# Patient Record
Sex: MALE | Race: BLACK OR AFRICAN AMERICAN | NOT HISPANIC OR LATINO | Employment: OTHER | URBAN - METROPOLITAN AREA
[De-identification: names, ages, dates, MRNs, and addresses within clinical notes are randomized per-mention and may not be internally consistent; named-entity substitution may affect disease eponyms.]

---

## 2018-01-29 ENCOUNTER — HOSPITAL ENCOUNTER (INPATIENT)
Facility: HOSPITAL | Age: 83
LOS: 6 days | Discharge: NURSING, REHAB OR LONG-TERM CARE | DRG: 682 | End: 2018-02-05
Attending: EMERGENCY MEDICINE | Admitting: INTERNAL MEDICINE
Payer: MEDICARE

## 2018-01-29 DIAGNOSIS — N13.39 OTHER HYDRONEPHROSIS: ICD-10-CM

## 2018-01-29 DIAGNOSIS — N18.30 STAGE 3 CHRONIC KIDNEY DISEASE: ICD-10-CM

## 2018-01-29 DIAGNOSIS — E87.5 HYPERKALEMIA: ICD-10-CM

## 2018-01-29 DIAGNOSIS — G30.9 ALZHEIMER'S DEMENTIA WITHOUT BEHAVIORAL DISTURBANCE, UNSPECIFIED TIMING OF DEMENTIA ONSET: Chronic | ICD-10-CM

## 2018-01-29 DIAGNOSIS — E78.5 HYPERLIPIDEMIA, UNSPECIFIED HYPERLIPIDEMIA TYPE: Chronic | ICD-10-CM

## 2018-01-29 DIAGNOSIS — N17.9 ACUTE RENAL FAILURE, UNSPECIFIED ACUTE RENAL FAILURE TYPE: ICD-10-CM

## 2018-01-29 DIAGNOSIS — R33.9 URINARY RETENTION: ICD-10-CM

## 2018-01-29 DIAGNOSIS — E83.39 HYPERPHOSPHATEMIA: ICD-10-CM

## 2018-01-29 DIAGNOSIS — F02.80 ALZHEIMER'S DEMENTIA WITHOUT BEHAVIORAL DISTURBANCE, UNSPECIFIED TIMING OF DEMENTIA ONSET: Chronic | ICD-10-CM

## 2018-01-29 DIAGNOSIS — E87.20 METABOLIC ACIDOSIS: ICD-10-CM

## 2018-01-29 DIAGNOSIS — Z71.89 GOALS OF CARE, COUNSELING/DISCUSSION: ICD-10-CM

## 2018-01-29 DIAGNOSIS — I10 BENIGN ESSENTIAL HYPERTENSION: Chronic | ICD-10-CM

## 2018-01-29 DIAGNOSIS — N18.9 ACUTE ON CHRONIC RENAL INSUFFICIENCY: Primary | ICD-10-CM

## 2018-01-29 DIAGNOSIS — N28.9 ACUTE ON CHRONIC RENAL INSUFFICIENCY: Primary | ICD-10-CM

## 2018-01-29 DIAGNOSIS — D64.9 ANEMIA, UNSPECIFIED TYPE: ICD-10-CM

## 2018-01-29 DIAGNOSIS — E43 SEVERE PROTEIN-CALORIE MALNUTRITION: ICD-10-CM

## 2018-01-29 LAB
ALBUMIN SERPL BCP-MCNC: 1.8 G/DL
ALP SERPL-CCNC: 141 U/L
ALT SERPL W/O P-5'-P-CCNC: 17 U/L
ANION GAP SERPL CALC-SCNC: 16 MMOL/L
ANISOCYTOSIS BLD QL SMEAR: SLIGHT
AST SERPL-CCNC: 21 U/L
BASOPHILS NFR BLD: 0 %
BILIRUB SERPL-MCNC: 0.6 MG/DL
BUN SERPL-MCNC: 136 MG/DL
CALCIUM SERPL-MCNC: 8.1 MG/DL
CHLORIDE SERPL-SCNC: 111 MMOL/L
CO2 SERPL-SCNC: 18 MMOL/L
CREAT SERPL-MCNC: 9.5 MG/DL
DIFFERENTIAL METHOD: ABNORMAL
EOSINOPHIL NFR BLD: 0 %
ERYTHROCYTE [DISTWIDTH] IN BLOOD BY AUTOMATED COUNT: 16.1 %
EST. GFR  (AFRICAN AMERICAN): 5 ML/MIN/1.73 M^2
EST. GFR  (NON AFRICAN AMERICAN): 4 ML/MIN/1.73 M^2
GLUCOSE SERPL-MCNC: 101 MG/DL
HCT VFR BLD AUTO: 23.6 %
HGB BLD-MCNC: 7.7 G/DL
LYMPHOCYTES NFR BLD: 14 %
MAGNESIUM SERPL-MCNC: 1.5 MG/DL
MCH RBC QN AUTO: 25.8 PG
MCHC RBC AUTO-ENTMCNC: 32.6 G/DL
MCV RBC AUTO: 79 FL
MONOCYTES NFR BLD: 6 %
NEUTROPHILS NFR BLD: 80 %
PHOSPHATE SERPL-MCNC: 6.4 MG/DL
PLATELET # BLD AUTO: 240 K/UL
PMV BLD AUTO: 10.3 FL
POTASSIUM SERPL-SCNC: 5.7 MMOL/L
PROT SERPL-MCNC: 5.9 G/DL
RBC # BLD AUTO: 2.98 M/UL
SODIUM SERPL-SCNC: 145 MMOL/L
TARGETS BLD QL SMEAR: ABNORMAL
WBC # BLD AUTO: 9.45 K/UL

## 2018-01-29 PROCEDURE — 84100 ASSAY OF PHOSPHORUS: CPT

## 2018-01-29 PROCEDURE — 85007 BL SMEAR W/DIFF WBC COUNT: CPT

## 2018-01-29 PROCEDURE — 93010 ELECTROCARDIOGRAM REPORT: CPT | Mod: ,,, | Performed by: INTERNAL MEDICINE

## 2018-01-29 PROCEDURE — 85027 COMPLETE CBC AUTOMATED: CPT

## 2018-01-29 PROCEDURE — 83735 ASSAY OF MAGNESIUM: CPT

## 2018-01-29 PROCEDURE — 93005 ELECTROCARDIOGRAM TRACING: CPT

## 2018-01-29 PROCEDURE — 80053 COMPREHEN METABOLIC PANEL: CPT

## 2018-01-29 PROCEDURE — 96360 HYDRATION IV INFUSION INIT: CPT

## 2018-01-29 PROCEDURE — 99285 EMERGENCY DEPT VISIT HI MDM: CPT | Mod: 25

## 2018-01-29 RX ORDER — TRAMADOL HYDROCHLORIDE 50 MG/1
50 TABLET ORAL EVERY 6 HOURS PRN
Status: ON HOLD | COMMUNITY
End: 2018-02-05 | Stop reason: HOSPADM

## 2018-01-29 RX ORDER — ATORVASTATIN CALCIUM 10 MG/1
10 TABLET, FILM COATED ORAL DAILY
Status: ON HOLD | COMMUNITY
End: 2018-03-15 | Stop reason: HOSPADM

## 2018-01-29 RX ORDER — METOPROLOL SUCCINATE 50 MG/1
50 TABLET, EXTENDED RELEASE ORAL DAILY
Status: ON HOLD | COMMUNITY
End: 2018-03-15 | Stop reason: HOSPADM

## 2018-01-30 PROBLEM — F03.90 DEMENTIA WITHOUT BEHAVIORAL DISTURBANCE: Chronic | Status: ACTIVE | Noted: 2018-01-30

## 2018-01-30 PROBLEM — E87.5 HYPERKALEMIA: Status: ACTIVE | Noted: 2018-01-30

## 2018-01-30 PROBLEM — N17.9 ARF (ACUTE RENAL FAILURE): Status: ACTIVE | Noted: 2018-01-30

## 2018-01-30 PROBLEM — E87.20 METABOLIC ACIDOSIS: Status: ACTIVE | Noted: 2018-01-30

## 2018-01-30 PROBLEM — D64.9 ANEMIA: Status: ACTIVE | Noted: 2018-01-30

## 2018-01-30 PROBLEM — N18.9 ACUTE ON CHRONIC RENAL INSUFFICIENCY: Status: ACTIVE | Noted: 2018-01-30

## 2018-01-30 PROBLEM — N28.9 ACUTE ON CHRONIC RENAL INSUFFICIENCY: Status: ACTIVE | Noted: 2018-01-30

## 2018-01-30 PROBLEM — E44.0 MALNUTRITION OF MODERATE DEGREE: Status: ACTIVE | Noted: 2018-01-30

## 2018-01-30 PROBLEM — E78.5 HYPERLIPIDEMIA: Chronic | Status: ACTIVE | Noted: 2018-01-30

## 2018-01-30 PROBLEM — I10 BENIGN ESSENTIAL HYPERTENSION: Chronic | Status: ACTIVE | Noted: 2018-01-30

## 2018-01-30 PROBLEM — E83.39 HYPERPHOSPHATEMIA: Status: ACTIVE | Noted: 2018-01-30

## 2018-01-30 LAB
BACTERIA #/AREA URNS HPF: ABNORMAL /HPF
BILIRUB UR QL STRIP: NEGATIVE
CLARITY UR: ABNORMAL
COLOR UR: ABNORMAL
CREAT UR-MCNC: 84.2 MG/DL
GLUCOSE UR QL STRIP: NEGATIVE
HGB UR QL STRIP: ABNORMAL
HYALINE CASTS #/AREA URNS LPF: 0 /LPF
KETONES UR QL STRIP: NEGATIVE
LEUKOCYTE ESTERASE UR QL STRIP: ABNORMAL
MICROSCOPIC COMMENT: ABNORMAL
NITRITE UR QL STRIP: NEGATIVE
PH UR STRIP: 5 [PH] (ref 5–8)
PROT UR QL STRIP: ABNORMAL
RBC #/AREA URNS HPF: 35 /HPF (ref 0–4)
SODIUM UR-SCNC: 43 MMOL/L
SP GR UR STRIP: 1.01 (ref 1–1.03)
SQUAMOUS #/AREA URNS HPF: 5 /HPF
URN SPEC COLLECT METH UR: ABNORMAL
UROBILINOGEN UR STRIP-ACNC: NEGATIVE EU/DL
WBC #/AREA URNS HPF: >100 /HPF (ref 0–5)

## 2018-01-30 PROCEDURE — 92610 EVALUATE SWALLOWING FUNCTION: CPT

## 2018-01-30 PROCEDURE — 94640 AIRWAY INHALATION TREATMENT: CPT

## 2018-01-30 PROCEDURE — G8997 SWALLOW GOAL STATUS: HCPCS | Mod: CI

## 2018-01-30 PROCEDURE — 25000003 PHARM REV CODE 250: Performed by: EMERGENCY MEDICINE

## 2018-01-30 PROCEDURE — G8996 SWALLOW CURRENT STATUS: HCPCS | Mod: CJ

## 2018-01-30 PROCEDURE — 25000242 PHARM REV CODE 250 ALT 637 W/ HCPCS: Performed by: EMERGENCY MEDICINE

## 2018-01-30 PROCEDURE — 63600175 PHARM REV CODE 636 W HCPCS: Performed by: EMERGENCY MEDICINE

## 2018-01-30 PROCEDURE — 84300 ASSAY OF URINE SODIUM: CPT

## 2018-01-30 PROCEDURE — 21400001 HC TELEMETRY ROOM

## 2018-01-30 PROCEDURE — 81000 URINALYSIS NONAUTO W/SCOPE: CPT

## 2018-01-30 PROCEDURE — 25000003 PHARM REV CODE 250: Performed by: HOSPITALIST

## 2018-01-30 PROCEDURE — C9113 INJ PANTOPRAZOLE SODIUM, VIA: HCPCS | Performed by: EMERGENCY MEDICINE

## 2018-01-30 PROCEDURE — 82570 ASSAY OF URINE CREATININE: CPT

## 2018-01-30 RX ORDER — PANTOPRAZOLE SODIUM 40 MG/10ML
40 INJECTION, POWDER, LYOPHILIZED, FOR SOLUTION INTRAVENOUS DAILY
Status: DISCONTINUED | OUTPATIENT
Start: 2018-01-30 | End: 2018-02-03

## 2018-01-30 RX ORDER — SODIUM BICARBONATE 1 MEQ/ML
50 SYRINGE (ML) INTRAVENOUS
Status: COMPLETED | OUTPATIENT
Start: 2018-01-30 | End: 2018-01-30

## 2018-01-30 RX ORDER — SODIUM CHLORIDE 9 MG/ML
INJECTION, SOLUTION INTRAVENOUS CONTINUOUS
Status: DISCONTINUED | OUTPATIENT
Start: 2018-01-30 | End: 2018-01-31

## 2018-01-30 RX ORDER — ALBUTEROL SULFATE 2.5 MG/.5ML
5 SOLUTION RESPIRATORY (INHALATION)
Status: COMPLETED | OUTPATIENT
Start: 2018-01-30 | End: 2018-01-30

## 2018-01-30 RX ORDER — ACETAMINOPHEN 325 MG/1
650 TABLET ORAL EVERY 6 HOURS PRN
Status: DISCONTINUED | OUTPATIENT
Start: 2018-01-30 | End: 2018-02-05 | Stop reason: HOSPADM

## 2018-01-30 RX ORDER — ONDANSETRON 2 MG/ML
4 INJECTION INTRAMUSCULAR; INTRAVENOUS EVERY 8 HOURS PRN
Status: DISCONTINUED | OUTPATIENT
Start: 2018-01-30 | End: 2018-02-05 | Stop reason: HOSPADM

## 2018-01-30 RX ORDER — METOPROLOL SUCCINATE 50 MG/1
50 TABLET, EXTENDED RELEASE ORAL DAILY
Status: DISCONTINUED | OUTPATIENT
Start: 2018-01-30 | End: 2018-02-05 | Stop reason: HOSPADM

## 2018-01-30 RX ADMIN — ALBUTEROL SULFATE 5 MG: 2.5 SOLUTION RESPIRATORY (INHALATION) at 02:01

## 2018-01-30 RX ADMIN — PANTOPRAZOLE SODIUM 40 MG: 40 INJECTION, POWDER, FOR SOLUTION INTRAVENOUS at 09:01

## 2018-01-30 RX ADMIN — SODIUM CHLORIDE: 0.9 INJECTION, SOLUTION INTRAVENOUS at 09:01

## 2018-01-30 RX ADMIN — SODIUM BICARBONATE 50 MEQ: 84 INJECTION, SOLUTION INTRAVENOUS at 01:01

## 2018-01-30 RX ADMIN — SODIUM CHLORIDE 500 ML: 0.9 INJECTION, SOLUTION INTRAVENOUS at 01:01

## 2018-01-30 RX ADMIN — METOPROLOL SUCCINATE 50 MG: 50 TABLET, EXTENDED RELEASE ORAL at 02:01

## 2018-01-30 RX ADMIN — ACETAMINOPHEN 650 MG: 325 TABLET ORAL at 08:01

## 2018-01-30 NOTE — SUBJECTIVE & OBJECTIVE
Past Medical History:   Diagnosis Date    Alzheimer disease     Arthritis     High cholesterol     Hypertension        Past Surgical History:   Procedure Laterality Date    BACK SURGERY         Review of patient's allergies indicates:  No Known Allergies    No current facility-administered medications on file prior to encounter.      No current outpatient prescriptions on file prior to encounter.     Family History     None        Social History Main Topics    Smoking status: Former Smoker    Smokeless tobacco: Never Used    Alcohol use No    Drug use: Unknown    Sexual activity: Not on file     Review of Systems   Unable to perform ROS: Dementia     Objective:     Vital Signs (Most Recent):  Temp: 98 °F (36.7 °C) (01/30/18 0804)  Pulse: 63 (01/30/18 0804)  Resp: (!) 22 (01/30/18 0804)  BP: 135/72 (01/30/18 0804)  SpO2: 98 % (01/30/18 0345) Vital Signs (24h Range):  Temp:  [98 °F (36.7 °C)-98.3 °F (36.8 °C)] 98 °F (36.7 °C)  Pulse:  [] 63  Resp:  [18-22] 22  SpO2:  [95 %-100 %] 98 %  BP: ()/() 135/72     Weight: 56.1 kg (123 lb 10.9 oz)  Body mass index is 16.77 kg/m².    Physical Exam   Constitutional: No distress.   Frail, Elderly   HENT:   Right Ear: External ear normal.   Left Ear: External ear normal.   Eyes: Conjunctivae are normal. Right eye exhibits no discharge. Left eye exhibits no discharge.   Neck: Neck supple.   Cardiovascular: Normal rate, regular rhythm and normal heart sounds.    Pulmonary/Chest: Effort normal and breath sounds normal. No stridor.   Abdominal: Soft. Bowel sounds are normal.   Musculoskeletal: He exhibits no tenderness.   Neurological: He is alert.   Does not answer questions or follow commands.   Skin: Skin is warm and dry. He is not diaphoretic.           Significant Labs:   BMP:   Recent Labs  Lab 01/29/18 2230         K 5.7*   *   CO2 18*   *   CREATININE 9.5*   CALCIUM 8.1*   MG 1.5*     CBC:   Recent Labs  Lab 01/29/18 2230    WBC 9.45   HGB 7.7*   HCT 23.6*          Significant Imaging: I have reviewed all pertinent imaging results/findings within the past 24 hours.

## 2018-01-30 NOTE — PT/OT/SLP EVAL
Speech Language Pathology Evaluation  Bedside Swallow    Patient Name:  Alen Marin   MRN:  [id number]  Admitting Diagnosis: ARF (acute renal failure)    Recommendations:                 General Recommendations:  Dysphagia therapy  Diet recommendations:  Puree, Thin   Aspiration Precautions: 1 bite/sip at a time and HOB to 90 degrees   General Precautions: Standard, pureed diet  Communication strategies:  simple sentences    History:     Past Medical History:   Diagnosis Date    Alzheimer disease     Arthritis     High cholesterol     Hypertension        Past Surgical History:   Procedure Laterality Date    BACK SURGERY         Social History: Patient lives at [facility].    Modified Barium Swallow: none on file    Chest X-Rays: no acute process    Prior diet: per [facility] soft with thin liquids (tolerating well per report)      Subjective   Pt requesting thin liquids upon ST entrance  Patient goals: intake of thin liquids    Objective:     Oral Musculature Evaluation  · Oral Musculature: unable to assess due to poor participation/comprehension  · Dentition: edentulous  · Voice Prior to PO Intake: wfl    Bedside Swallow Eval:   Consistencies Assessed:  · Thin liquids 10oz multiple trials via straw  · Puree X3  · Solids X1     Oral Phase:   · WFL- thin via straw  · Required moderate cuing to orally accept puree trials via spoon  · Pt noted to suck solid trials rather than attend to bolus prep; trial was removed from oral cavity  · Mild swallow delay    Pharyngeal Phase:   · no overt clinical signs/symptoms of aspiration    Compensatory Strategies  · None    Treatment: will follow as baseline diet is mech soft per report; attention to bolus prep and A-P is therefore below reported baseline.    Assessment:     Alen Marin is a 88 y.o. male with diagnosis of ARF he presents with mild-moderate oral dysphagia c/b decreased attention to bolus.     Goals:    SLP Goals        Problem: SLP Goal    Goal Priority  Disciplines Outcome   SLP Goal    Low SLP Ongoing (interventions implemented as appropriate)   Description:  STGs  1. Pt will tolerate puree with thin liquids without overt s/s of aspiration  2. Pt will perform adequate bolus prep and A-P transport of solid trials X3.                    Plan:     · Patient to be seen:  3 x/week   · Plan of Care expires:  02/09/18  · Plan of Care reviewed with:  patient   · SLP Follow-Up:  Yes       Discharge recommendations:  nursing facility, basic   Barriers to Discharge:  None    Time Tracking:     SLP Treatment Date:   01/30/18  Speech Start Time:  1420  Speech Stop Time:  1430     Speech Total Time (min):  10 min    Billable Minutes: Eval Swallow and Oral Function 10    Isabella Merino CCC-SLP  01/30/2018

## 2018-01-30 NOTE — PLAN OF CARE
From [facility] correction     01/30/18 8780   Discharge Assessment   Assessment Type Discharge Planning Assessment   Assessment information obtained from? Patient;Medical Record;Caregiver   Prior to hospitilization cognitive status: Not Oriented to Place;Not Oriented to Time   Prior to hospitalization functional status: Needs Assistance;Partially Dependent   Current Functional Status: Needs Assistance;Partially Dependent   Lives With facility resident  ([facility])   Able to Return to Prior Arrangements yes   Is patient able to care for self after discharge? No   Who are your caregiver(s) and their phone number(s)? (son, Miguelito Marin [phone])   Patient's perception of discharge disposition admitted as an inpatient   Readmission Within The Last 30 Days no previous admission in last 30 days   Patient currently being followed by outpatient case management? No   Patient currently receives any other outside agency services? No   Equipment Currently Used at Home other (see comments)  (DME used at [facility])   Do you have any problems affording any of your prescribed medications? No   Is the patient taking medications as prescribed? yes   Does the patient have transportation home? Yes   Transportation Available van, wheelchair accessible;ambulance   Does the patient receive services at the [facility]? No   Discharge Plan A Return to nursing home   Discharge Plan B Skilled Nursing Facility   Patient/Family In Agreement With Plan yes

## 2018-01-30 NOTE — PLAN OF CARE
Problem: SLP Goal  Goal: SLP Goal  STGs  1. Pt will tolerate puree with thin liquids without overt s/s of aspiration  2. Pt will perform adequate bolus prep and A-P transport of solid trials X3.   Outcome: Ongoing (interventions implemented as appropriate)  1/30/18  RECS: puree with thin liquids, meds according to Pt preference, will follow. Isabella Merino, CURT-SLP

## 2018-01-30 NOTE — ASSESSMENT & PLAN NOTE
Continue home Toprol.  Will place parameters to make sure renal failure not secondary to episodes of hypotension.

## 2018-01-30 NOTE — ASSESSMENT & PLAN NOTE
Probably anemia of chronic disease, but no previous labs.  No evidence of bleeding.  Continue to monitor.

## 2018-01-30 NOTE — CONSULTS
Dictation #1  MRN:[id number]  CSN:[id number]    Consult dictated # [id number]    Continue IVF  Watch renal function closely  We'll follow  Thanks

## 2018-01-30 NOTE — ASSESSMENT & PLAN NOTE
Related to (etiology):   Inadequate oral intake     Signs and Symptoms (as evidenced by):   1) Fluid accumulation  2) Mild/moderate muscle mass loss of bilateral temporalis     Interventions/Recommendations (treatment strategy):  1) Renal diet (pureed per Speech Therapy recommendations) 2) Continue Novasource Renal supplement 3) Renal MVI 4) Consider appetite stimulant 5) Monitor po intake and tolerance 6) Monitor weight    Nutrition Diagnosis Status:   New

## 2018-01-30 NOTE — PROGRESS NOTES
[facility]  Adult Nutrition  Progress Note    SUMMARY     Recommendations  1) Renal diet (pureed per Speech Therapy recommendations) 2) Continue Novasource Renal supplement 3) Renal MVI 4) Consider appetite stimulant 5) Monitor po intake and tolerance 6) Monitor weight  Goals: 1) Promote weight gain 2) Patient to tolerate oral diet 3) Patient to consume >=85% of EEN x5 days  Nutrition Goal Status: new  Communication of RD Recs: other (comment) (care plan/notes, paged MD)    Continuum of Care Plan    Nutrition Discharge Planning: Patient to discharge on a Renal diet (texture per Speech Therapy) with ONS TID to promote weight gain.    Reason for Assessment    Reason for Assessment: identified at risk by screening criteria (patient refusing food and fluids)  Diagnosis: other (see comments) (ARF, hyperkalemia)  General Information Comments: SLP assessed, recommends pureed diet with thin liquids. Patient refusing meals and fluids per nursing. Patient sleeping during visit. Physical signs of moderate malnutrition observed. RD to follow-up.    Nutrition Prescription Ordered    Current Diet Order: Renal    Oral Nutrition Supplement: Novasource TID     Evaluation of Received Nutrients/Fluid Intake    Energy Calories Required: not meeting needs  Protein Required: not meeting needs    IV Fluid (mL): 1200    I/O: not fully recorded    Fluid Required: meeting needs     Tolerance: other (see comments) (FRANCO - refusing meals)  % Intake of Estimated Energy Needs: 0 - 25 %  % Meal Intake: 25%     Nutrition Risk Screen     Nutrition Risk Screen: other (see comments) (pt refusing fluids and food)    Nutrition/Diet History    Food Preferences: franco    Factors Affecting Nutritional Intake: behavioral (mealtime), impaired cognitive status/motor control, decreased appetite, difficulty/impaired swallowing    Labs/Tests/Procedures/Meds    Diagnostic Test/Procedure Review: reviewed, pertinent  Pertinent Labs Reviewed:  reviewed, pertinent  BMP  Lab Results   Component Value Date     01/29/2018    K 5.7 (H) 01/29/2018     (H) 01/29/2018    CO2 18 (L) 01/29/2018     (H) 01/29/2018    CREATININE 9.5 (H) 01/29/2018    CALCIUM 8.1 (L) 01/29/2018    ANIONGAP 16 01/29/2018    ESTGFRAFRICA 5 (A) 01/29/2018    EGFRNONAA 4 (A) 01/29/2018     Lab Results   Component Value Date    CALCIUM 8.1 (L) 01/29/2018    PHOS 6.4 (H) 01/29/2018     Lab Results   Component Value Date    ALBUMIN 1.8 (L) 01/29/2018     Pertinent Medications Reviewed: reviewed, pertinent  Pertinent Medications Comments: IVF    Physical Findings    Overall Physical Appearance: edematous, loss of muscle mass, underweight  Tubes: other (see comments) (none)  Skin: edema, intact (Javan Score 12)    Anthropometrics    Temp: 98 °F (36.7 °C)     Height: 6' (182.9 cm)  Weight Method: Bed Scale  Weight: 56.1 kg (123 lb 10.9 oz)  Ideal Body Weight (IBW), Male: 178 lb  % Ideal Body Weight, Male (lb): 69.48 lb  BMI (Calculated): 16.8  BMI Grade: 16 - 16.9 protein-energy malnutrition grade II     Usual Body Weight (UBW), kg:  (yulissa - no weight records in EPIC)      Estimated/Assessed Needs    Weight Used For Calorie Calculations: 56.1 kg (123 lb 10.9 oz)     Energy Need Method: Kcal/kg (1963 - 2244)  RMR ([place]-St. Jeor Equation): 1269  Weight Used For Protein Calculations: 56.1 kg (123 lb 10.9 oz)  Protein Requirements: 67 g  Fluid Need Method: RDA method or per MD (UOP not recorded (ARF))  CHO Requirement: N/A    Assessment and Plan    Malnutrition of moderate degree    Related to (etiology):   Inadequate oral intake     Signs and Symptoms (as evidenced by):   1) Fluid accumulation  2) Mild/moderate muscle mass loss of bilateral temporalis     Interventions/Recommendations (treatment strategy):  1) Renal diet (pureed per Speech Therapy recommendations) 2) Continue Novasource Renal supplement 3) Renal MVI 4) Consider appetite stimulant 5) Monitor po intake and  tolerance 6) Monitor weight    Nutrition Diagnosis Status:   New              Monitor and Evaluation    Food and Nutrient Intake: energy intake, food and beverage intake  Food and Nutrient Adminstration: diet order, other (specify) (supplement order)  Physical Activity and Function: nutrition-related ADLs and IADLs  Anthropometric Measurements: weight, weight change, body mass index  Biochemical Data, Medical Tests and Procedures: electrolyte and renal panel, gastrointestinal profile, inflammatory profile, lipid profile  Nutrition-Focused Physical Findings: overall appearance, extremities, muscles and bones, head and eyes, skin    Nutrition Risk    Level of Risk: other (see comments) (f/u 2x weekly)    Nutrition Follow-Up    RD Follow-up?: Yes

## 2018-01-30 NOTE — NURSING
Report received from VETO Perez. Rounds and bedside shift report completed. Pt in no acute distress. Care assumed.

## 2018-01-30 NOTE — ASSESSMENT & PLAN NOTE
Patient with significant elevation of BUN/Creat.  No previous labs and unable to determine if patient may have some degree of CKD.  Seems pre renal.  Started on IVF hydration and Nephrology consulted.  Will get Renal US.  Awaiting urine studies.  Will have to consider other etiologies if no improvement with hydration.  Repeat BMP in AM.

## 2018-01-30 NOTE — ED PROVIDER NOTES
Encounter Date: 1/29/2018    SCRIBE #1 NOTE: I, Glenn Malagon, am scribing for, and in the presence of,  Dank Joseph III, MD. I have scribed the following portions of the note - Other sections scribed: HPI, ROS.       History     Chief Complaint   Patient presents with    Abnormal Lab     sent from [facility] for abnormal BUN (24) and Creatine (8.29),      CC: Abnormal Lab    HPI: This 88 y.o. Male with arthritis, high cholestrol and HTN presents to the ED for abnormal BUN and Creatine (8.29).  Approximately one month ago patient was admitted to an outside facility for subdural hematoma and acute kidney injury.  Internal medicine and nephrology H&P from that hospitalization are brought in from nursing facility today.  After that hospitalization patient was transferred to [facility].  Repeat labs performed 3 days ago revealed worsening renal function.  He has not taken any medications for his current symptoms.  Patient reports that he feels okay but is otherwise unable to contribute to the history due to severe underlying dementia.      The history is provided by the patient. No  was used.     Review of patient's allergies indicates:  No Known Allergies  Past Medical History:   Diagnosis Date    Alzheimer disease     Arthritis     High cholesterol     Hypertension      Past Surgical History:   Procedure Laterality Date    BACK SURGERY       History reviewed. No pertinent family history.  Social History   Substance Use Topics    Smoking status: Former Smoker    Smokeless tobacco: Never Used    Alcohol use No     Review of Systems   Unable to perform ROS: Dementia       Physical Exam     Initial Vitals [01/29/18 1956]   BP Pulse Resp Temp SpO2   (!) 103/43 66 19 98.3 °F (36.8 °C) 95 %      MAP       63         Physical Exam    Constitutional: He appears well-developed and well-nourished. He is not diaphoretic. No distress.   HENT:   Head: Normocephalic and atraumatic.   Nose:  Nose normal.   Mouth/Throat: Oropharynx is clear and moist. No oropharyngeal exudate.   Eyes: Conjunctivae and EOM are normal. Pupils are equal, round, and reactive to light. No scleral icterus.   Neck: Normal range of motion. Neck supple. No thyromegaly present. No tracheal deviation present.   Cardiovascular: Normal rate and normal heart sounds. Exam reveals no gallop and no friction rub.    No murmur heard.  Irregular   Pulmonary/Chest: Breath sounds normal. No respiratory distress. He has no wheezes. He has no rhonchi. He has no rales.   Abdominal: Soft. Bowel sounds are normal. He exhibits no distension and no mass. There is no tenderness. There is no rebound and no guarding.   Musculoskeletal: Normal range of motion. He exhibits no edema or tenderness.   Lymphadenopathy:     He has no cervical adenopathy.   Neurological: He is alert. He has normal strength. No cranial nerve deficit or sensory deficit.   Demented and confused, grossly normal neurologic exam   Skin: Skin is warm and dry. No rash noted. No erythema. No pallor.   Psychiatric: He has a normal mood and affect. His behavior is normal. Thought content normal.         ED Course   Critical Care  Date/Time: 1/30/2018 3:13 AM  Performed by: TOAN RBOWN III  Authorized by: TOAN BROWN III   Direct patient critical care time: 15 minutes  Additional history critical care time: 6 minutes  Ordering / reviewing critical care time: 7 minutes  Documentation critical care time: 7 minutes  Consulting other physicians critical care time: 5 minutes  Total critical care time (exclusive of procedural time) : 40 minutes  Critical care time was exclusive of teaching time and separately billable procedures and treating other patients.  Critical care was necessary to treat or prevent imminent or life-threatening deterioration of the following conditions: renal failure.        Labs Reviewed   CBC W/ AUTO DIFFERENTIAL - Abnormal; Notable for the following:         Result Value    RBC 2.98 (*)     Hemoglobin 7.7 (*)     Hematocrit 23.6 (*)     MCV 79 (*)     MCH 25.8 (*)     RDW 16.1 (*)     Gran% 80.0 (*)     Lymph% 14.0 (*)     All other components within normal limits   COMPREHENSIVE METABOLIC PANEL - Abnormal; Notable for the following:     Potassium 5.7 (*)     Chloride 111 (*)     CO2 18 (*)     BUN, Bld 136 (*)     Creatinine 9.5 (*)     Calcium 8.1 (*)     Total Protein 5.9 (*)     Albumin 1.8 (*)     Alkaline Phosphatase 141 (*)     eGFR if  5 (*)     eGFR if non  4 (*)     All other components within normal limits   MAGNESIUM - Abnormal; Notable for the following:     Magnesium 1.5 (*)     All other components within normal limits   PHOSPHORUS - Abnormal; Notable for the following:     Phosphorus 6.4 (*)     All other components within normal limits   URINALYSIS             Medical Decision Making:   Initial Assessment:   88-year-old male history of hypertension, high cholesterol, Alzheimer's disease sent from skilled nursing facility for renal failure.  Admitted recently for subdural hematoma and acute kidney injury.  Notes from that admission were brought in today with patient's paperwork from [facility].  At that time patient had a Robles catheter placed and then attempted to remove it, causing hematuria.  BUN and creatinine performed 3 days ago were 124 and 8.29, with a potassium of 5.3 and bicarbonate of 21.  Labs performed on December 28 showed BUN and creatinine of 114 and 6.2, with a potassium of 4.0 and a bicarbonate of 15.  It looks as though commerce patient was had with family regarding goals of care, but no conclusion is written in the notes provided.  The patient does arrive with documentation that he is full code with full medical treatment preferred, including long-term artificial initiation by tube.  This documentation was signed by a family member on January 2 and by a physician on January 13.  Patient is  demented and confused on exam, but is awake and alert.  Independently Interpreted Test(s):   I have ordered and independently interpreted X-rays - see summary below.       <> Summary of X-Ray Reading(s): Chest x-ray: No acute abnormality  I have ordered and independently interpreted EKG Reading(s) - see summary below       <> Summary of EKG Reading(s): Sinus rhythm with sinus arrhythmia versus atrial fibrillation, if axis deviation, right bundle branch block, no definite acute ischemia  ED Management:  Laboratory evaluation reveals worsening renal function and also hemoglobin of 7.7.  Patient is guaiac negative.  Given family's desire for full care, will recommend admission for IV fluids, repeat labs, nephrology consultation.    May need family meeting to discuss the very low likelihood of meaningful recovery without feeding tube and/or dialysis.             Scribe Attestation:   Scribe #1: I performed the above scribed service and the documentation accurately describes the services I performed. I attest to the accuracy of the note.    Attending Attestation:           Physician Attestation for Scribe:  Physician Attestation Statement for Scribe #1: I, Dank Joseph III, MD, reviewed documentation, as scribed by Glenn Malagon in my presence, and it is both accurate and complete.                 ED Course      Clinical Impression:   The primary encounter diagnosis was Acute on chronic renal insufficiency. Diagnoses of Hyperkalemia, Metabolic acidosis, and Anemia, unspecified type were also pertinent to this visit.                           Dank Joseph III, MD  01/30/18 0313

## 2018-01-30 NOTE — ED TRIAGE NOTES
Pt presents to ED with c/o abnormal kidney function. Per nursing staff at [facility], his creatine and BUN is elevated. Lab work on 1/26/17 shows BUN of 24 and a Creatine of 8.2. Family at bedside reports he had a fall with bleeding in the breain around [facility] and since then he has been having issues with hydration and his kidneys. Pt does have hx of alzheimer's per family and is at baseline. No distress is noted. Will continue to be monitored.

## 2018-01-30 NOTE — PLAN OF CARE
Problem: Patient Care Overview  Goal: Plan of Care Review  01/30/2018    Recommendations  1) Renal diet (pureed per Speech Therapy recommendations) 2) Continue Novasource Renal supplement 3) Renal MVI 4) Consider appetite stimulant 5) Monitor po intake and tolerance 6) Monitor weight  Goals: 1) Promote weight gain 2) Patient to tolerate oral diet 3) Patient to consume >=85% of EEN x5 days  Nutrition Goal Status: new  Communication of RD Recs: other (comment) (care plan/notes, paged MD)    Alie Del Rio, MPH, RD, LDN

## 2018-01-30 NOTE — ASSESSMENT & PLAN NOTE
Hyperkalemia, Metabolic Acidosis and Hyperphosphatemia secondary to renal failure.  No evidence of peaked T waves on EKG.  Closely monitor.

## 2018-01-30 NOTE — HPI
87 y/o male presents to the ER from NH for abnormal BUN and Creatine (8.2).  Patient has advanced dementia and unable to give history.  Called patient's son, but no answer.  History obtained per ER notes.  Approximately one month ago patient was admitted to an outside facility for subdural hematoma and acute kidney injury.  After that hospitalization patient was transferred to [facility].  Repeat labs performed 3 days ago revealed worsening renal function.  Patient reports that he feels okay but is otherwise unable to contribute to the history due to severe underlying dementia.  Patient was noted to have significant elevation of BUN/Creat upon presentation and admitted for treatment and work up.  No further history able to be obtained.

## 2018-01-30 NOTE — H&P
[facility] Hospital Medicine  History & Physical    Patient Name: Alen Marin  MRN: [id number]  Admission Date: 1/29/2018  Attending Physician: Tung Brush MD   Primary Care Provider: [facility] Medical Records         Patient information was obtained from ER records.     Subjective:     Principal Problem:ARF (acute renal failure)    Chief Complaint:   Chief Complaint   Patient presents with    Abnormal Lab     sent from [facility] for abnormal BUN (24) and Creatine (8.29),         HPI: 89 y/o male presents to the ER from NH for abnormal BUN and Creatine (8.2).  Patient has advanced dementia and unable to give history.  Called patient's son, but no answer.  History obtained per ER notes.  Approximately one month ago patient was admitted to an outside facility for subdural hematoma and acute kidney injury.  After that hospitalization patient was transferred to [facility].  Repeat labs performed 3 days ago revealed worsening renal function.  Patient reports that he feels okay but is otherwise unable to contribute to the history due to severe underlying dementia.  Patient was noted to have significant elevation of BUN/Creat upon presentation and admitted for treatment and work up.  No further history able to be obtained.    Past Medical History:   Diagnosis Date    Alzheimer disease     Arthritis     High cholesterol     Hypertension        Past Surgical History:   Procedure Laterality Date    BACK SURGERY         Review of patient's allergies indicates:  No Known Allergies    No current facility-administered medications on file prior to encounter.      No current outpatient prescriptions on file prior to encounter.     Family History     None        Social History Main Topics    Smoking status: Former Smoker    Smokeless tobacco: Never Used    Alcohol use No    Drug use: Unknown    Sexual activity: Not on file     Review of Systems   Unable to perform ROS:  Dementia     Objective:     Vital Signs (Most Recent):  Temp: 98 °F (36.7 °C) (01/30/18 0804)  Pulse: 63 (01/30/18 0804)  Resp: (!) 22 (01/30/18 0804)  BP: 135/72 (01/30/18 0804)  SpO2: 98 % (01/30/18 0345) Vital Signs (24h Range):  Temp:  [98 °F (36.7 °C)-98.3 °F (36.8 °C)] 98 °F (36.7 °C)  Pulse:  [] 63  Resp:  [18-22] 22  SpO2:  [95 %-100 %] 98 %  BP: ()/() 135/72     Weight: 56.1 kg (123 lb 10.9 oz)  Body mass index is 16.77 kg/m².    Physical Exam   Constitutional: No distress.   Frail, Elderly   HENT:   Right Ear: External ear normal.   Left Ear: External ear normal.   Eyes: Conjunctivae are normal. Right eye exhibits no discharge. Left eye exhibits no discharge.   Neck: Neck supple.   Cardiovascular: Normal rate, regular rhythm and normal heart sounds.    Pulmonary/Chest: Effort normal and breath sounds normal. No stridor.   Abdominal: Soft. Bowel sounds are normal.   Musculoskeletal: He exhibits no tenderness.   Neurological: He is alert.   Does not answer questions or follow commands.   Skin: Skin is warm and dry. He is not diaphoretic.           Significant Labs:   BMP:   Recent Labs  Lab 01/29/18  2230         K 5.7*   *   CO2 18*   *   CREATININE 9.5*   CALCIUM 8.1*   MG 1.5*     CBC:   Recent Labs  Lab 01/29/18  2230   WBC 9.45   HGB 7.7*   HCT 23.6*          Significant Imaging: I have reviewed all pertinent imaging results/findings within the past 24 hours.    Assessment/Plan:     * ARF (acute renal failure)    Patient with significant elevation of BUN/Creat.  No previous labs and unable to determine if patient may have some degree of CKD.  Seems pre renal.  Started on IVF hydration and Nephrology consulted.  Will get Renal US.  Awaiting urine studies.  Will have to consider other etiologies if no improvement with hydration.  Repeat BMP in AM.        Hyperkalemia    Hyperkalemia, Metabolic Acidosis and Hyperphosphatemia secondary to renal failure.  No  evidence of peaked T waves on EKG.  Closely monitor.        Hyperlipidemia              Benign essential hypertension    Continue home Toprol.  Will place parameters to make sure renal failure not secondary to episodes of hypotension.          Dementia without behavioral disturbance    Probably at baseline.          Anemia    Probably anemia of chronic disease, but no previous labs.  No evidence of bleeding.  Continue to monitor.          VTE Risk Mitigation         Ordered     Medium Risk of VTE  Once      01/30/18 0708     Place sequential compression device  Until discontinued      01/30/18 0708     Place TRISTAN hose  Until discontinued      01/30/18 0708             Tung Brush MD  Department of Hospital Medicine   [facility]

## 2018-01-31 ENCOUNTER — ANESTHESIA (OUTPATIENT)
Dept: SURGERY | Facility: HOSPITAL | Age: 83
DRG: 682 | End: 2018-01-31
Payer: MEDICARE

## 2018-01-31 ENCOUNTER — TELEPHONE (OUTPATIENT)
Dept: UROLOGY | Facility: CLINIC | Age: 83
End: 2018-01-31

## 2018-01-31 ENCOUNTER — ANESTHESIA EVENT (OUTPATIENT)
Dept: SURGERY | Facility: HOSPITAL | Age: 83
DRG: 682 | End: 2018-01-31
Payer: MEDICARE

## 2018-01-31 PROBLEM — N13.39 OTHER HYDRONEPHROSIS: Status: ACTIVE | Noted: 2018-01-31

## 2018-01-31 LAB
ABO + RH BLD: NORMAL
ALBUMIN SERPL BCP-MCNC: 1.7 G/DL
ALP SERPL-CCNC: 129 U/L
ALT SERPL W/O P-5'-P-CCNC: 12 U/L
ANION GAP SERPL CALC-SCNC: 16 MMOL/L
ANION GAP SERPL CALC-SCNC: 16 MMOL/L
AST SERPL-CCNC: 25 U/L
BASOPHILS # BLD AUTO: 0.01 K/UL
BASOPHILS NFR BLD: 0.1 %
BILIRUB SERPL-MCNC: 0.5 MG/DL
BLD GP AB SCN CELLS X3 SERPL QL: NORMAL
BUN SERPL-MCNC: 142 MG/DL
BUN SERPL-MCNC: 142 MG/DL
CALCIUM SERPL-MCNC: 7.5 MG/DL
CALCIUM SERPL-MCNC: 7.5 MG/DL
CHLORIDE SERPL-SCNC: 117 MMOL/L
CHLORIDE SERPL-SCNC: 117 MMOL/L
CO2 SERPL-SCNC: 16 MMOL/L
CO2 SERPL-SCNC: 16 MMOL/L
CREAT SERPL-MCNC: 8.3 MG/DL
CREAT SERPL-MCNC: 8.3 MG/DL
DIFFERENTIAL METHOD: ABNORMAL
EOSINOPHIL # BLD AUTO: 0 K/UL
EOSINOPHIL NFR BLD: 0.5 %
ERYTHROCYTE [DISTWIDTH] IN BLOOD BY AUTOMATED COUNT: 16.4 %
EST. GFR  (AFRICAN AMERICAN): 6 ML/MIN/1.73 M^2
EST. GFR  (AFRICAN AMERICAN): 6 ML/MIN/1.73 M^2
EST. GFR  (NON AFRICAN AMERICAN): 5 ML/MIN/1.73 M^2
EST. GFR  (NON AFRICAN AMERICAN): 5 ML/MIN/1.73 M^2
GLUCOSE SERPL-MCNC: 86 MG/DL
GLUCOSE SERPL-MCNC: 86 MG/DL
HCT VFR BLD AUTO: 22.2 %
HGB BLD-MCNC: 7 G/DL
LYMPHOCYTES # BLD AUTO: 1.6 K/UL
LYMPHOCYTES NFR BLD: 18.6 %
MCH RBC QN AUTO: 25.3 PG
MCHC RBC AUTO-ENTMCNC: 31.5 G/DL
MCV RBC AUTO: 80 FL
MONOCYTES # BLD AUTO: 0.8 K/UL
MONOCYTES NFR BLD: 9.8 %
NEUTROPHILS # BLD AUTO: 6 K/UL
NEUTROPHILS NFR BLD: 71 %
PLATELET # BLD AUTO: 234 K/UL
PMV BLD AUTO: 10.2 FL
POTASSIUM SERPL-SCNC: 4.6 MMOL/L
POTASSIUM SERPL-SCNC: 4.6 MMOL/L
PROT SERPL-MCNC: 5.4 G/DL
RBC # BLD AUTO: 2.77 M/UL
SODIUM SERPL-SCNC: 149 MMOL/L
SODIUM SERPL-SCNC: 149 MMOL/L
WBC # BLD AUTO: 8.38 K/UL

## 2018-01-31 PROCEDURE — D9220A PRA ANESTHESIA: Mod: ANES,,, | Performed by: ANESTHESIOLOGY

## 2018-01-31 PROCEDURE — 94761 N-INVAS EAR/PLS OXIMETRY MLT: CPT

## 2018-01-31 PROCEDURE — 52005 CYSTO W/URTRL CATHJ: CPT | Mod: ,,, | Performed by: UROLOGY

## 2018-01-31 PROCEDURE — C9113 INJ PANTOPRAZOLE SODIUM, VIA: HCPCS | Performed by: EMERGENCY MEDICINE

## 2018-01-31 PROCEDURE — D9220A PRA ANESTHESIA: Mod: CRNA,,, | Performed by: NURSE ANESTHETIST, CERTIFIED REGISTERED

## 2018-01-31 PROCEDURE — 25000003 PHARM REV CODE 250: Performed by: INTERNAL MEDICINE

## 2018-01-31 PROCEDURE — 80053 COMPREHEN METABOLIC PANEL: CPT

## 2018-01-31 PROCEDURE — 25000003 PHARM REV CODE 250: Performed by: EMERGENCY MEDICINE

## 2018-01-31 PROCEDURE — 36000706: Performed by: UROLOGY

## 2018-01-31 PROCEDURE — 37000009 HC ANESTHESIA EA ADD 15 MINS: Performed by: UROLOGY

## 2018-01-31 PROCEDURE — 25000003 PHARM REV CODE 250: Performed by: NURSE ANESTHETIST, CERTIFIED REGISTERED

## 2018-01-31 PROCEDURE — 71000039 HC RECOVERY, EACH ADD'L HOUR: Performed by: UROLOGY

## 2018-01-31 PROCEDURE — C1758 CATHETER, URETERAL: HCPCS | Performed by: UROLOGY

## 2018-01-31 PROCEDURE — 86920 COMPATIBILITY TEST SPIN: CPT

## 2018-01-31 PROCEDURE — 27000221 HC OXYGEN, UP TO 24 HOURS

## 2018-01-31 PROCEDURE — 0T778DZ DILATION OF LEFT URETER WITH INTRALUMINAL DEVICE, VIA NATURAL OR ARTIFICIAL OPENING ENDOSCOPIC: ICD-10-PCS | Performed by: UROLOGY

## 2018-01-31 PROCEDURE — 85025 COMPLETE CBC W/AUTO DIFF WBC: CPT

## 2018-01-31 PROCEDURE — 74420 UROGRAPHY RTRGR +-KUB: CPT | Mod: 26,,, | Performed by: UROLOGY

## 2018-01-31 PROCEDURE — 25500020 PHARM REV CODE 255: Performed by: UROLOGY

## 2018-01-31 PROCEDURE — BT141ZZ FLUOROSCOPY OF KIDNEYS, URETERS AND BLADDER USING LOW OSMOLAR CONTRAST: ICD-10-PCS | Performed by: UROLOGY

## 2018-01-31 PROCEDURE — 71000033 HC RECOVERY, INTIAL HOUR: Performed by: UROLOGY

## 2018-01-31 PROCEDURE — 99223 1ST HOSP IP/OBS HIGH 75: CPT | Mod: 25,,, | Performed by: UROLOGY

## 2018-01-31 PROCEDURE — 36000707: Performed by: UROLOGY

## 2018-01-31 PROCEDURE — 94002 VENT MGMT INPAT INIT DAY: CPT

## 2018-01-31 PROCEDURE — 86850 RBC ANTIBODY SCREEN: CPT

## 2018-01-31 PROCEDURE — 92526 ORAL FUNCTION THERAPY: CPT

## 2018-01-31 PROCEDURE — 12000002 HC ACUTE/MED SURGE SEMI-PRIVATE ROOM

## 2018-01-31 PROCEDURE — C1769 GUIDE WIRE: HCPCS | Performed by: UROLOGY

## 2018-01-31 PROCEDURE — 63600175 PHARM REV CODE 636 W HCPCS: Performed by: HOSPITALIST

## 2018-01-31 PROCEDURE — 63600175 PHARM REV CODE 636 W HCPCS: Performed by: EMERGENCY MEDICINE

## 2018-01-31 PROCEDURE — 25000003 PHARM REV CODE 250: Performed by: HOSPITALIST

## 2018-01-31 PROCEDURE — 37000008 HC ANESTHESIA 1ST 15 MINUTES: Performed by: UROLOGY

## 2018-01-31 PROCEDURE — 63600175 PHARM REV CODE 636 W HCPCS: Performed by: NURSE ANESTHETIST, CERTIFIED REGISTERED

## 2018-01-31 PROCEDURE — 36415 COLL VENOUS BLD VENIPUNCTURE: CPT

## 2018-01-31 RX ORDER — HYDROMORPHONE HYDROCHLORIDE 2 MG/ML
0.2 INJECTION, SOLUTION INTRAMUSCULAR; INTRAVENOUS; SUBCUTANEOUS EVERY 5 MIN PRN
Status: DISCONTINUED | OUTPATIENT
Start: 2018-01-31 | End: 2018-02-05 | Stop reason: HOSPADM

## 2018-01-31 RX ORDER — DIPHENHYDRAMINE HYDROCHLORIDE 50 MG/ML
25 INJECTION INTRAMUSCULAR; INTRAVENOUS EVERY 6 HOURS PRN
Status: DISCONTINUED | OUTPATIENT
Start: 2018-01-31 | End: 2018-02-05 | Stop reason: HOSPADM

## 2018-01-31 RX ORDER — PHENYLEPHRINE HYDROCHLORIDE 10 MG/ML
INJECTION INTRAVENOUS
Status: DISCONTINUED | OUTPATIENT
Start: 2018-01-31 | End: 2018-01-31

## 2018-01-31 RX ORDER — SODIUM CHLORIDE 0.9 % (FLUSH) 0.9 %
3 SYRINGE (ML) INJECTION
Status: DISCONTINUED | OUTPATIENT
Start: 2018-01-31 | End: 2018-02-05 | Stop reason: HOSPADM

## 2018-01-31 RX ORDER — GLYCOPYRROLATE 0.2 MG/ML
INJECTION INTRAMUSCULAR; INTRAVENOUS
Status: DISCONTINUED | OUTPATIENT
Start: 2018-01-31 | End: 2018-01-31

## 2018-01-31 RX ORDER — SODIUM CHLORIDE 450 MG/100ML
INJECTION, SOLUTION INTRAVENOUS CONTINUOUS
Status: DISCONTINUED | OUTPATIENT
Start: 2018-01-31 | End: 2018-02-02

## 2018-01-31 RX ORDER — ROCURONIUM BROMIDE 10 MG/ML
INJECTION, SOLUTION INTRAVENOUS
Status: DISCONTINUED | OUTPATIENT
Start: 2018-01-31 | End: 2018-01-31

## 2018-01-31 RX ORDER — SODIUM CHLORIDE 9 MG/ML
INJECTION, SOLUTION INTRAVENOUS CONTINUOUS PRN
Status: DISCONTINUED | OUTPATIENT
Start: 2018-01-31 | End: 2018-01-31

## 2018-01-31 RX ORDER — METOCLOPRAMIDE HYDROCHLORIDE 5 MG/ML
10 INJECTION INTRAMUSCULAR; INTRAVENOUS EVERY 10 MIN PRN
Status: DISCONTINUED | OUTPATIENT
Start: 2018-01-31 | End: 2018-02-05 | Stop reason: HOSPADM

## 2018-01-31 RX ORDER — PROPOFOL 10 MG/ML
VIAL (ML) INTRAVENOUS
Status: DISCONTINUED | OUTPATIENT
Start: 2018-01-31 | End: 2018-01-31

## 2018-01-31 RX ORDER — ACETAMINOPHEN 10 MG/ML
1000 INJECTION, SOLUTION INTRAVENOUS ONCE
Status: DISPENSED | OUTPATIENT
Start: 2018-01-31 | End: 2018-02-01

## 2018-01-31 RX ORDER — FENTANYL CITRATE 50 UG/ML
INJECTION, SOLUTION INTRAMUSCULAR; INTRAVENOUS
Status: DISCONTINUED | OUTPATIENT
Start: 2018-01-31 | End: 2018-01-31

## 2018-01-31 RX ORDER — SUCCINYLCHOLINE CHLORIDE 20 MG/ML
INJECTION INTRAMUSCULAR; INTRAVENOUS
Status: DISCONTINUED | OUTPATIENT
Start: 2018-01-31 | End: 2018-01-31

## 2018-01-31 RX ORDER — NEOSTIGMINE METHYLSULFATE 1 MG/ML
INJECTION, SOLUTION INTRAVENOUS
Status: DISCONTINUED | OUTPATIENT
Start: 2018-01-31 | End: 2018-01-31

## 2018-01-31 RX ORDER — MEPERIDINE HYDROCHLORIDE 50 MG/ML
12.5 INJECTION INTRAMUSCULAR; INTRAVENOUS; SUBCUTANEOUS ONCE AS NEEDED
Status: ACTIVE | OUTPATIENT
Start: 2018-01-31 | End: 2018-01-31

## 2018-01-31 RX ORDER — HYDROCODONE BITARTRATE AND ACETAMINOPHEN 500; 5 MG/1; MG/1
TABLET ORAL
Status: DISCONTINUED | OUTPATIENT
Start: 2018-01-31 | End: 2018-02-05 | Stop reason: HOSPADM

## 2018-01-31 RX ADMIN — SODIUM CHLORIDE: 0.9 INJECTION, SOLUTION INTRAVENOUS at 03:01

## 2018-01-31 RX ADMIN — METOPROLOL SUCCINATE 50 MG: 50 TABLET, EXTENDED RELEASE ORAL at 09:01

## 2018-01-31 RX ADMIN — FENTANYL CITRATE 50 MCG: 50 INJECTION INTRAMUSCULAR; INTRAVENOUS at 03:01

## 2018-01-31 RX ADMIN — GLYCOPYRROLATE 0.4 MG: 0.2 INJECTION, SOLUTION INTRAMUSCULAR; INTRAVENOUS at 04:01

## 2018-01-31 RX ADMIN — SODIUM CHLORIDE: 0.45 INJECTION, SOLUTION INTRAVENOUS at 06:01

## 2018-01-31 RX ADMIN — ROCURONIUM BROMIDE 5 MG: 10 INJECTION, SOLUTION INTRAVENOUS at 04:01

## 2018-01-31 RX ADMIN — SODIUM CHLORIDE: 0.9 INJECTION, SOLUTION INTRAVENOUS at 04:01

## 2018-01-31 RX ADMIN — CEFTRIAXONE 1 G: 1 INJECTION, SOLUTION INTRAVENOUS at 09:01

## 2018-01-31 RX ADMIN — FENTANYL CITRATE 50 MCG: 50 INJECTION INTRAMUSCULAR; INTRAVENOUS at 04:01

## 2018-01-31 RX ADMIN — PANTOPRAZOLE SODIUM 40 MG: 40 INJECTION, POWDER, FOR SOLUTION INTRAVENOUS at 09:01

## 2018-01-31 RX ADMIN — PHENYLEPHRINE HYDROCHLORIDE 200 MCG: 10 INJECTION INTRAVENOUS at 04:01

## 2018-01-31 RX ADMIN — PROPOFOL 100 MG: 10 INJECTION, EMULSION INTRAVENOUS at 04:01

## 2018-01-31 RX ADMIN — SUCCINYLCHOLINE CHLORIDE 120 MG: 20 INJECTION, SOLUTION INTRAMUSCULAR; INTRAVENOUS at 04:01

## 2018-01-31 RX ADMIN — EPHEDRINE SULFATE 25 MG: 50 INJECTION, SOLUTION INTRAMUSCULAR; INTRAVENOUS; SUBCUTANEOUS at 04:01

## 2018-01-31 RX ADMIN — NEOSTIGMINE METHYLSULFATE 4 MG: 1 INJECTION INTRAVENOUS at 04:01

## 2018-01-31 RX ADMIN — SODIUM CHLORIDE 1000 ML: 0.9 INJECTION, SOLUTION INTRAVENOUS at 12:01

## 2018-01-31 NOTE — ASSESSMENT & PLAN NOTE
Patient with significant elevation of BUN/Creat.  No previous labs and unable to determine if patient may have some degree of CKD.  Initially thought to be pre renal and started IVF's.  Appreciate Nephrology input.  Renal US showing Bilateral Hydronephrosis and distended bladder.   ARF probably secondary to obstructive uropathy.  Urology consulted.  UA also consistent with UTI.  Started on Rocephin.  Send UCx.

## 2018-01-31 NOTE — ASSESSMENT & PLAN NOTE
Make NPO, he ate a small amount of eggs at 8 a.m.    Plan for cystoscopy, likely urethral dilation, later this afternoon.    Consent obtained with son.

## 2018-01-31 NOTE — PT/OT/SLP PROGRESS
Speech Language Pathology Treatment    Patient Name:  Alen Marin   MRN:  [id number]  Admitting Diagnosis: ARF (acute renal failure)    Recommendations:                 General Recommendations:  Dysphagia therapy  Diet recommendations:  Puree, Liquid Diet Level: Thin   Aspiration Precautions: 1 bite/sip at a time, Alternating bites/sips, Assistance with meals, Check for pocketing/oral residue, Feed only when awake/alert, HOB to 90 degrees, Remain upright 30 minutes post meal, Small bites/sips and Standard aspiration precautions   General Precautions: Standard, pureed diet  Communication strategies:  provide increased time to answer and go to room if call light pushed    Subjective     Patient in room with son,  Minimal verbal responses form patient and minimal participation  Patient goals: unable to state    Pain/Comfort:  ·  did not indicate any    Objective:     Has the patient been evaluated by SLP for swallowing?   Yes  Keep patient NPO? No   Current Respiratory Status: room air      Requires total assistance for feeding, minimal intake for breakfast and then made NPO for remainder of day secondary to late afternoon procedure.  Patient is tolerating thin liquids     Assessment:     Alen Marin is a 88 y.o. male with an SLP diagnosis of Dysphagia.  He presents with decreased bolus formation and transport.    Goals:    SLP Goals        Problem: SLP Goal    Goal Priority Disciplines Outcome   SLP Goal    Low SLP Ongoing (interventions implemented as appropriate)   Description:  STGs  1. Pt will tolerate puree with thin liquids without overt s/s of aspiration  2. Pt will perform adequate bolus prep and A-P transport of solid trials X3.                    Plan:     · Patient to be seen:  3 x/week   · Plan of Care expires:  02/09/18  · Plan of Care reviewed with:  patient   · SLP Follow-Up:  Yes       Discharge recommendations:  nursing facility, basic   Barriers to Discharge:  Level of Skilled Assistance Needed  increased medical care needed at this time    Time Tracking:     SLP Treatment Date:   01/31/18  Speech Start Time:  1040  Speech Stop Time:  1100     Speech Total Time (min):  20 min    Billable Minutes: Treatment Swallowing Dysfunction 20    Shruti Lee CCC-SLP  01/31/2018

## 2018-01-31 NOTE — PROGRESS NOTES
[facility] Hospital Medicine  Progress Note    Patient Name: Alen Marin  MRN: [id number]  Patient Class: IP- Inpatient   Admission Date: 1/29/2018  Length of Stay: 1 days  Attending Physician: Tung Brush MD  Primary Care Provider: [facility] Medical Records        Subjective:     Principal Problem:ARF (acute renal failure)    HPI:  89 y/o male presents to the ER from NH for abnormal BUN and Creatine (8.2).  Patient has advanced dementia and unable to give history.  Called patient's son, but no answer.  History obtained per ER notes.  Approximately one month ago patient was admitted to an outside facility for subdural hematoma and acute kidney injury.  After that hospitalization patient was transferred to [facility].  Repeat labs performed 3 days ago revealed worsening renal function.  Patient reports that he feels okay but is otherwise unable to contribute to the history due to severe underlying dementia.  Patient was noted to have significant elevation of BUN/Creat upon presentation and admitted for treatment and work up.  No further history able to be obtained.    Hospital Course:  89 y/o male was sent from NH for ARF.  Initially thought to be pre renal and started on IVF's.  Renal US showing bilateral hydronephrosis and distended bladder.  Urology consulted.  Nephrology also following.    Interval History: No acute events overnight.    Review of Systems   Unable to perform ROS: Dementia     Objective:     Vital Signs (Most Recent):  Temp: 98.7 °F (37.1 °C) (01/31/18 0750)  Pulse: 100 (01/31/18 0802)  Resp: 20 (01/31/18 0750)  BP: 122/65 (01/31/18 0750)  SpO2: (!) 93 % (01/31/18 0750) Vital Signs (24h Range):  Temp:  [97.6 °F (36.4 °C)-100 °F (37.8 °C)] 98.7 °F (37.1 °C)  Pulse:  [] 100  Resp:  [16-20] 20  SpO2:  [93 %-99 %] 93 %  BP: (109-172)/() 122/65     Weight: 57.6 kg (126 lb 15.8 oz)  Body mass index is 17.22 kg/m².    Intake/Output Summary  (Last 24 hours) at 01/31/18 1147  Last data filed at 01/31/18 0600   Gross per 24 hour   Intake             1575 ml   Output               50 ml   Net             1525 ml      Physical Exam   Constitutional: No distress.   Frail, Elderly   HENT:   Right Ear: External ear normal.   Left Ear: External ear normal.   Eyes: Conjunctivae are normal. Right eye exhibits no discharge. Left eye exhibits no discharge.   Neck: Neck supple.   Cardiovascular: Normal rate, regular rhythm and normal heart sounds.    Pulmonary/Chest: Effort normal and breath sounds normal. No stridor.   Abdominal: Soft. Bowel sounds are normal.   Musculoskeletal: He exhibits no tenderness.   Neurological: He is alert.   Does not answer questions or follow commands.   Skin: Skin is warm and dry. He is not diaphoretic.       Significant Labs:   BMP:   Recent Labs  Lab 01/29/18 2230 01/31/18  0554    86  86    149*  149*   K 5.7* 4.6  4.6   * 117*  117*   CO2 18* 16*  16*   * 142*  142*   CREATININE 9.5* 8.3*  8.3*   CALCIUM 8.1* 7.5*  7.5*   MG 1.5*  --      CBC:   Recent Labs  Lab 01/29/18 2230 01/31/18  0554   WBC 9.45 8.38   HGB 7.7* 7.0*   HCT 23.6* 22.2*    234       Significant Imaging: I have reviewed all pertinent imaging results/findings within the past 24 hours.    Assessment/Plan:      * ARF (acute renal failure)    Patient with significant elevation of BUN/Creat.  No previous labs and unable to determine if patient may have some degree of CKD.  Initially thought to be pre renal and started IVF's.  Appreciate Nephrology input.  Renal US showing Bilateral Hydronephrosis and distended bladder.   ARF probably secondary to obstructive uropathy.  Urology consulted.  UA also consistent with UTI.  Started on Rocephin.  Send UCx.        Hyperkalemia    Hyperkalemia, Metabolic Acidosis and Hyperphosphatemia secondary to renal failure.  No evidence of peaked T waves on EKG.  Hyperkalemia much improved.         Anemia    Probably anemia of chronic disease, but no previous labs.  No evidence of bleeding.  H/H lower today.  Discussed with son.  Will continue to monitor for now, but may need blood transfusion if H/H keeps decreasing.        Hyperlipidemia              Benign essential hypertension    Continue home Toprol.  Will place parameters to make sure renal failure not secondary to episodes of hypotension.          Dementia without behavioral disturbance    Probably at baseline.            VTE Risk Mitigation         Ordered     Medium Risk of VTE  Once      01/30/18 0708     Place sequential compression device  Until discontinued      01/30/18 0708     Place TRISTAN hose  Until discontinued      01/30/18 0708              Tung Brush MD  Department of Hospital Medicine   [facility]

## 2018-01-31 NOTE — NURSING
Report given to VETO Hancock. Round and bedside shift report completed. Pt in no acute distress.

## 2018-01-31 NOTE — TELEPHONE ENCOUNTER
----- Message from Itzel Marquez sent at 1/31/2018  9:52 AM CST -----  Contact: Kristin MARSH  Nurse [place] ; Consult for bilateral hydronephrosis. Please call back [phone].

## 2018-01-31 NOTE — CONSULTS
[facility]  Urology  Consult Note    Patient Name: Alen Marin  MRN: [id number]  Admission Date: 1/29/2018  Hospital Length of Stay: 1   Code Status: Full Code   Attending Provider: Tung Brush MD   Consulting Provider: SJ Price MD  Primary Care Physician: [facility] Medical Records  Principal Problem:ARF (acute renal failure)    Inpatient consult to Urology  Consult performed by: MARCY PRICE  Consult ordered by: TUNG BRUSH          Subjective:     HPI:  Hydronephrosis  This is an 88 year old male who was sent to [facility] from a nursing facility due to elevated creatinine.  A renal ultrasound showed bilateral hydronephrosis and Urology was consulted. Nursing staff has not attempted any Robles.  He is voiding.    His son tells me there was an issue with a Robles about a month ago that required a urologist to get a Robles in place.  He is not sure what was required but he was bleeding after multiple Robles attempts.    Past Medical History:   Diagnosis Date    Alzheimer disease     Arthritis     High cholesterol     Hypertension        Past Surgical History:   Procedure Laterality Date    BACK SURGERY         Review of patient's allergies indicates:  No Known Allergies    Family History     None          Social History Main Topics    Smoking status: Former Smoker    Smokeless tobacco: Never Used    Alcohol use No    Drug use: Unknown    Sexual activity: Not on file       Review of Systems   Constitutional: Negative.    HENT: Negative.    Eyes: Negative.    Respiratory: Negative for cough, chest tightness and shortness of breath.    Cardiovascular: Negative for chest pain.   Gastrointestinal: Negative.  Negative for constipation, diarrhea and nausea.   Genitourinary: Positive for difficulty urinating.   Musculoskeletal: Negative.    Neurological: Negative.    Psychiatric/Behavioral: Negative.        Objective:     Temp:  [97.6 °F (36.4 °C)-100 °F (37.8 °C)]  98.7 °F (37.1 °C)  Pulse:  [] 100  Resp:  [16-20] 20  SpO2:  [93 %-99 %] 93 %  BP: (109-172)/() 122/65     Body mass index is 17.22 kg/m².            Drains          No matching active lines, drains, or airways          Physical Exam   Nursing note and vitals reviewed.  Constitutional: He is oriented to person, place, and time. He appears well-developed and well-nourished.   HENT:   Head: Normocephalic.   Eyes: Conjunctivae are normal.   Neck: Normal range of motion. Neck supple. No tracheal deviation present. No thyromegaly present.   Cardiovascular: Normal rate and normal heart sounds.    Pulmonary/Chest: Effort normal and breath sounds normal. No respiratory distress. He has no wheezes.   Abdominal: Soft. Bowel sounds are normal. There is no hepatosplenomegaly. There is no tenderness. There is no rebound and no CVA tenderness. No hernia.   Genitourinary: Testes normal and penis normal. Right testis shows no mass and no tenderness. Left testis shows no mass and no tenderness. Uncircumcised.   Musculoskeletal: Normal range of motion. He exhibits no edema or tenderness.   Lymphadenopathy:     He has no cervical adenopathy.   Neurological: He is alert and oriented to person, place, and time.   Skin: Skin is warm and dry. No rash noted. No erythema.     Psychiatric: He has a normal mood and affect. His behavior is normal. Judgment and thought content normal.       Significant Labs:    BMP:    Recent Labs  Lab 01/29/18 2230 01/31/18  0554    149*  149*   K 5.7* 4.6  4.6   * 117*  117*   CO2 18* 16*  16*   * 142*  142*   CREATININE 9.5* 8.3*  8.3*   CALCIUM 8.1* 7.5*  7.5*       CBC:    Recent Labs  Lab 01/29/18 2230 01/31/18  0554   WBC 9.45 8.38   HGB 7.7* 7.0*   HCT 23.6* 22.2*    234       Blood Culture: No results for input(s): LABBLOO in the last 168 hours.  Urine Culture: No results for input(s): LABURIN in the last 168 hours.    Significant Imaging:  U/S: I have  reviewed all results within the past 24 hours and my personal findings are:  moderate bilateral hydronephrosis.  full bladder      Robles attempt  Using sterile technique and nursing staff assisting, I attempted a 16 Fr Robles and met resistance near the prostate.  Robles not placed at this time.  No bleeding at present.              Assessment and Plan:     Other hydronephrosis    Make NPO, he ate a small amount of eggs at 8 a.m.    Plan for cystoscopy, likely urethral dilation, later this afternoon.    Consent obtained with son.        Acute on chronic renal insufficiency    Will attempt Robles in OR            VTE Risk Mitigation         Ordered     Medium Risk of VTE  Once      01/30/18 0708     Place sequential compression device  Until discontinued      01/30/18 0708     Place TRISTAN hose  Until discontinued      01/30/18 0708          Thank you for your consult. I will follow-up with patient. Please contact us if you have any additional questions.    SJ Price MD  Urology  [facility]

## 2018-01-31 NOTE — CONSULTS
REASON FOR CONSULTATION:  Renal failure.    HISTORY OF PRESENT ILLNESS:  The patient is an 88-year-old man with past medical   history significant for hypertension, arthritis, Alzheimer's, who was brought   to the Emergency Room from the nursing home because of elevation of serum BUN   and creatinine as well as a potassium level and the patient will be placed on IV   fluid and was admitted to the hospital and we were consulted to see the patient   for acute renal failure and hyperkalemia.  At this time, the patient is awake,   but a very poor historian, most of the information was obtained from review of   the patient's chart.    PAST MEDICAL HISTORY:  As above.    MEDICATIONS:  The patient is currently receiving Protonix 40 mg IV daily and   normal saline at 75 mL per hour.    FAMILY HISTORY:  Noncontributory.    SOCIAL HISTORY:  The patient appeared had no recent history of tobacco use,   alcohol abuse or IV drug use.    PHYSICAL EXAMINATION:  GENERAL:  The patient is awake, alert and in no apparent distress.  VITAL SIGNS:  Temperature 98.0 with a blood pressure of 135/72 with a heart rate   of 63 and pulse of 22.  HEENT:  Pupils are equal, round, reactive to light.  EOMI.  Oral mucosa is dry.  HEART:  Regular rhythm and rate.  LUNGS:  Clear to auscultation and percussion bilaterally.  ABDOMEN:  Soft, positive bowel sounds, nondistended, nontender.  EXTREMITIES:  Without any edema.    LABORATORY DATA:  WBC is 9.45, hemoglobin 7.7, hematocrit 23.6 and platelet   count 240.  Sodium is 145, potassium 5.7, chloride 111, CO2 18, ,   creatinine 9.5, and glucose 105.    IMPRESSION:  1.  Acute kidney injury, prerenal azotemia in combination with a possibility of   ATN due to prolonged stay of dehydration.  2.  Hyperkalemia, mild, related to renal failure, should be improving with IV   fluid.  3.  Hypertension.  4.  Dementia.  5.  Anemia of chronic disease.    DISCUSSION AND RECOMMENDATION:  We will send urine for  sodium, creatinine and   urinalysis.  We will obtain a renal ultrasound.  We will also obtain iron, TIBC   and iron sats and we will follow the patient with you.    Thank you for the courtesy of the consultation and allowing us to participate in   the patient's care.      SANDRA  dd: 01/30/2018 10:02:24 (CST)  td: 01/30/2018 18:49:59 (CST)  Doc ID   #[id number]  Job ID #[id number]    CC:

## 2018-01-31 NOTE — ANESTHESIA PREPROCEDURE EVALUATION
01/31/2018  Alen Marin is a 88 y.o., male.    Anesthesia Evaluation     I have reviewed the Nursing Notes.      Review of Systems  Social:  Former Smoker   Hematology/Oncology:         -- Anemia: Hematology Comments: Likely chronic anemia. Hb is 7, was 7.7 yday. Pt is not acutely bleeding.   Cardiovascular:   Exercise tolerance: poor Denies Pacemaker. Hypertension     Pulmonary:  Pulmonary Normal    Renal/:   Chronic Renal Disease, ARF Post-renal (obstructive) failure.   Hepatic/GI:  Hepatic/GI Normal    Musculoskeletal:   Arthritis     Neurological:   Denies CVA. Denies Seizures.  Dementia    Endocrine:  Endocrine Normal        Physical Exam  General:  Cachexia                 Anesthesia Plan  Type of Anesthesia, risks & benefits discussed:  Anesthesia Type:  general  Patient's Preference:   Intra-op Monitoring Plan: standard ASA monitors  Intra-op Monitoring Plan Comments:   Post Op Pain Control Plan:   Post Op Pain Control Plan Comments:   Induction:   IV  Beta Blocker:  Patient is on a Beta-Blocker and has received one dose within the past 24 hours (No further documentation required).       Informed Consent:    ASA Score: 3     Day of Surgery Review of History & Physical:    H&P update referred to the surgeon.         Ready For Surgery From Anesthesia Perspective.

## 2018-01-31 NOTE — OR NURSING
1734: Pt extubated at 1734. Secretions noted; suctioned with Yankauer. Place on face tent at 40%. Tolerated well. Will continue to monitor pt for any changes.

## 2018-01-31 NOTE — PLAN OF CARE
Problem: Fall Risk (Adult)  Goal: Identify Related Risk Factors and Signs and Symptoms  Related risk factors and signs and symptoms are identified upon initiation of Human Response Clinical Practice Guideline (CPG)   Outcome: Ongoing (interventions implemented as appropriate)   01/31/18 0301   Fall Risk   Related Risk Factors (Fall Risk) age-related changes;bladder function altered;confusion/agitation;fatigue/slow reaction;gait/mobility problems;neuro disease/injury;environment unfamiliar   Signs and Symptoms (Fall Risk) presence of risk factors       Problem: Patient Care Overview  Goal: Plan of Care Review  Outcome: Ongoing (interventions implemented as appropriate)   01/31/18 0301   Coping/Psychosocial   Plan Of Care Reviewed With patient;son       Problem: Pressure Ulcer Risk (Javan Scale) (Adult,Obstetrics,Pediatric)  Goal: Identify Related Risk Factors and Signs and Symptoms  Related risk factors and signs and symptoms are identified upon initiation of Human Response Clinical Practice Guideline (CPG)   Outcome: Ongoing (interventions implemented as appropriate)   01/31/18 0301   Pressure Ulcer Risk (Javan Scale)   Related Risk Factors (Pressure Ulcer Risk (Javan Scale)) age extremes;cognitive impairment;excretions/secretions;fluid intake inadequate;mechanical forces;medical devices;mobility impaired;nutritional deficiencies       Problem: Kidney Disease, Chronic/End Stage Renal Disease (Adult)  Goal: Signs and Symptoms of Listed Potential Problems Will be Absent, Minimized or Managed (Kidney Disease, Chronic/End Stage Renal Disease)  Signs and symptoms of listed potential problems will be absent, minimized or managed by discharge/transition of care (reference Kidney Disease, Chronic/End Stage Renal Disease (Adult) CPG).   Outcome: Ongoing (interventions implemented as appropriate)   01/31/18 0301   Kidney Disease, Chronic/End Stage Renal Disease   Problems Assessed (Chronic Kidney Disease/ESRD) all    Problems Present (Chronic Kidney Disease/ESRD) acid-base imbalance;cardiovascular disease;electrolyte imbalance;fluid imbalance;functional decline/self-care deficit;hypertension   Plan of care reviewed with patient and son. Patient with no evidence of learning. Fall precautions maintained. Incontinent of stool and urine. Pericare done and moisture barrier applied. Skin intact. Turned and positioned with wedge. Resistant to turn, keeps arms and legs contracted. Passive ROM done with max assist.

## 2018-01-31 NOTE — ASSESSMENT & PLAN NOTE
Hyperkalemia, Metabolic Acidosis and Hyperphosphatemia secondary to renal failure.  No evidence of peaked T waves on EKG.  Hyperkalemia much improved.

## 2018-01-31 NOTE — BRIEF OP NOTE
[facility]  Brief Operative Note    SUMMARY     Surgery Date: 1/31/2018     Surgeon(s) and Role:     * MARCY Price MD - Primary    Assisting Surgeon: None    Pre-op Diagnosis:  Hydronephrosis, bilateral [N13.30]    Post-op Diagnosis:  Post-Op Diagnosis Codes:     * Hydronephrosis, bilateral [N13.30]    Procedure(s) (LRB):  CYSTOSCOPY WITH RETROGRADE PYELOGRAM (Bilateral)  PLACEMENT chester (N/A)    Anesthesia: General    Description of Procedure: mild narrowing of bulbar urethra, accepted 21 fr cystoscope, evidence of old TURP with significant regrowth of median lobe.  1000 ml urine in bladder.  Gentle retrograde pyelogram showed J-hooking with hydroureter down to UVJ.  Both ureters drained with bladder drainage.    Description of the findings of the procedure: as above    Estimated Blood Loss: * No values recorded between 1/31/2018  4:24 PM and 1/31/2018  4:43 PM *           Plan:Keep Chester x 1 week to allow diuresis      Specimens:   Specimen (12h ago through future)    None

## 2018-01-31 NOTE — HOSPITAL COURSE
Mr. Marin was sent from [facility] for ARF.  Initially, it was thought to be pre renal etiology alone and he was treated with IVF's. Further workup with renal U/S showed bilateral hydronephrosis and distended bladder.  Urology was consulted along with Nephrology. UA was markedly abnormal and there was concern for infection, and so he was also treated empirically with Rocephin for possible UTI. He underwent cystoscopy with bilateral retrograde pyelogram, and chester catheter placement on 1/31.  His renal function steadily improved and his dehydration with hypernatremia was continued with treatment with D5W. BUN/creainine peak was 136/9.5. His Na level normalized and his creatinine on the day of discharge was 2.6. Pt was matched with I/Os given anticipated post obstructive diuresis. Urine culture was ordered but was never appropriately cultured. His volume status was corrected and patient likely has CKD3 as per Nephrology. Pt has advanced dementia and has minimal PO intake despite assistance due to his progressive dementia which is only expected to continue to worsen. I discussed this with his son on multiple occasions, with recommendation for DNR given in the event of a cardiac arrest it would be medical futile and would only serve to harm the patient. Additionally, I explained to the son that a feeding tube would also not benefit this patient as well and recommended against it and I did not believe he would be a candidate that  would offer this procedure. Palliative care was consulted to give information to the family as well. Pt will need to keep chester catheter in place for a least a week post discharge and follow up with Urology for voiding trial. Empiric Cipro was substituted upon discharge given it was unclear if he had an infection and we did not have culture results to direct therapy. Pt at high risk for readmission.

## 2018-01-31 NOTE — SUBJECTIVE & OBJECTIVE
Past Medical History:   Diagnosis Date    Alzheimer disease     Arthritis     High cholesterol     Hypertension        Past Surgical History:   Procedure Laterality Date    BACK SURGERY         Review of patient's allergies indicates:  No Known Allergies    Family History     None          Social History Main Topics    Smoking status: Former Smoker    Smokeless tobacco: Never Used    Alcohol use No    Drug use: Unknown    Sexual activity: Not on file       Review of Systems   Constitutional: Negative.    HENT: Negative.    Eyes: Negative.    Respiratory: Negative for cough, chest tightness and shortness of breath.    Cardiovascular: Negative for chest pain.   Gastrointestinal: Negative.  Negative for constipation, diarrhea and nausea.   Genitourinary: Positive for difficulty urinating.   Musculoskeletal: Negative.    Neurological: Negative.    Psychiatric/Behavioral: Negative.        Objective:     Temp:  [97.6 °F (36.4 °C)-100 °F (37.8 °C)] 98.7 °F (37.1 °C)  Pulse:  [] 100  Resp:  [16-20] 20  SpO2:  [93 %-99 %] 93 %  BP: (109-172)/() 122/65     Body mass index is 17.22 kg/m².            Drains          No matching active lines, drains, or airways          Physical Exam   Nursing note and vitals reviewed.  Constitutional: He is oriented to person, place, and time. He appears well-developed and well-nourished.   HENT:   Head: Normocephalic.   Eyes: Conjunctivae are normal.   Neck: Normal range of motion. Neck supple. No tracheal deviation present. No thyromegaly present.   Cardiovascular: Normal rate and normal heart sounds.    Pulmonary/Chest: Effort normal and breath sounds normal. No respiratory distress. He has no wheezes.   Abdominal: Soft. Bowel sounds are normal. There is no hepatosplenomegaly. There is no tenderness. There is no rebound and no CVA tenderness. No hernia.   Genitourinary: Testes normal and penis normal. Right testis shows no mass and no tenderness. Left testis shows no  mass and no tenderness. Uncircumcised.   Musculoskeletal: Normal range of motion. He exhibits no edema or tenderness.   Lymphadenopathy:     He has no cervical adenopathy.   Neurological: He is alert and oriented to person, place, and time.   Skin: Skin is warm and dry. No rash noted. No erythema.     Psychiatric: He has a normal mood and affect. His behavior is normal. Judgment and thought content normal.       Significant Labs:    BMP:    Recent Labs  Lab 01/29/18 2230 01/31/18  0554    149*  149*   K 5.7* 4.6  4.6   * 117*  117*   CO2 18* 16*  16*   * 142*  142*   CREATININE 9.5* 8.3*  8.3*   CALCIUM 8.1* 7.5*  7.5*       CBC:    Recent Labs  Lab 01/29/18 2230 01/31/18  0554   WBC 9.45 8.38   HGB 7.7* 7.0*   HCT 23.6* 22.2*    234       Blood Culture: No results for input(s): LABBLOO in the last 168 hours.  Urine Culture: No results for input(s): LABURIN in the last 168 hours.    Significant Imaging:  U/S: I have reviewed all results within the past 24 hours and my personal findings are:  moderate bilateral hydronephrosis.  full bladder      Robles attempt  Using sterile technique and nursing staff assisting, I attempted a 16 Fr Robles and met resistance near the prostate.  Robles not placed at this time.  No bleeding at present.

## 2018-01-31 NOTE — TRANSFER OF CARE
Anesthesia Transfer of Care Note    Patient: Alen Marin    Procedure(s) Performed: Procedure(s) (LRB):  CYSTOSCOPY WITH RETROGRADE PYELOGRAM (Bilateral)  PLACEMENT chester (N/A)    Patient location: PACU    Anesthesia Type: general    Transport from OR: Transported from OR intubated on 100% O2 by AMBU with assisted ventilation. Upon arrival to PACU/ICU, patient attached to ventilator and auscultated to confirm bilateral breath sounds and adequate TV    Post pain: adequate analgesia    Post assessment: no apparent anesthetic complications and tolerated procedure well    Post vital signs: stable    Level of consciousness: sedated    Nausea/Vomiting: no nausea/vomiting    Complications: none    Transfer of care protocol was followed      Last vitals:   Visit Vitals  /82 (BP Location: Left arm, Patient Position: Lying)   Pulse 76   Temp 36.4 °C (97.5 °F) (Axillary)   Resp (!) 8   Ht 6' (1.829 m)   Wt 57.6 kg (126 lb 15.8 oz)   SpO2 100%   BMI 17.22 kg/m²

## 2018-01-31 NOTE — PROGRESS NOTES
Alen Marin is a 88 y.o. male patient.    Follow for LEANA, hyperkalemia    No new c/o, comfortable    Scheduled Meds:   cefTRIAXone (ROCEPHIN) IVPB  1 g Intravenous Q24H    metoprolol succinate  50 mg Oral Daily    pantoprazole  40 mg Intravenous Daily       Review of patient's allergies indicates:  No Known Allergies      Vital Signs Range (Last 24H):  Temp:  [97.6 °F (36.4 °C)-100 °F (37.8 °C)]   Pulse:  []   Resp:  [16-20]   BP: (109-172)/()   SpO2:  [93 %-99 %]     I & O (Last 24H):  Intake/Output Summary (Last 24 hours) at 01/31/18 1112  Last data filed at 01/31/18 0600   Gross per 24 hour   Intake             1575 ml   Output               50 ml   Net             1525 ml           Physical Exam:  General appearance: well developed, no distress  Lungs:  clear to auscultation bilaterally and normal respiratory effort  Heart: regular rate and rhythm  Abdomen: soft, non-tender non-distented; bowel sounds normal; no masses,  no organomegaly  Extremities: no cyanosis or edema, or clubbing    Laboratory:  CBC:   Recent Labs  Lab 01/31/18  0554   WBC 8.38   RBC 2.77*   HGB 7.0*   HCT 22.2*      MCV 80*   MCH 25.3*   MCHC 31.5*     CMP:   Recent Labs  Lab 01/31/18  0554   GLU 86  86   CALCIUM 7.5*  7.5*   ALBUMIN 1.7*   PROT 5.4*   *  149*   K 4.6  4.6   CO2 16*  16*   *  117*   *  142*   CREATININE 8.3*  8.3*   ALKPHOS 129   ALT 12   AST 25   BILITOT 0.5       Imp/Plan    LEANA - post obstructive uropathy  Hyperkalemia - resolved  HTN  Dementia  Anemia of chronic disease    Urology to place chester later today  CMP in am  Change IVF to 1/2 NS      Trac T Le  1/31/2018

## 2018-01-31 NOTE — PLAN OF CARE
Problem: SLP Goal  Goal: SLP Goal  STGs  1. Pt will tolerate puree with thin liquids without overt s/s of aspiration  2. Pt will perform adequate bolus prep and A-P transport of solid trials X3.   Outcome: Ongoing (interventions implemented as appropriate)  Min po intake at breakfast  Tolerating thin liquids

## 2018-01-31 NOTE — HPI
Hydronephrosis  This is an 88 year old male who was sent to [facility] from a nursing facility due to elevated creatinine.  A renal ultrasound showed bilateral hydronephrosis and Urology was consulted. Nursing staff has not attempted any Robles.  He is voiding.    His son tells me there was an issue with a Robles about a month ago that required a urologist to get a Robles in place.  He is not sure what was required but he was bleeding after multiple Robles attempts.

## 2018-01-31 NOTE — ASSESSMENT & PLAN NOTE
Probably anemia of chronic disease, but no previous labs.  No evidence of bleeding.  H/H lower today.  Discussed with son.  Will continue to monitor for now, but may need blood transfusion if H/H keeps decreasing.

## 2018-01-31 NOTE — SUBJECTIVE & OBJECTIVE
Interval History: No acute events overnight.    Review of Systems   Unable to perform ROS: Dementia     Objective:     Vital Signs (Most Recent):  Temp: 98.7 °F (37.1 °C) (01/31/18 0750)  Pulse: 100 (01/31/18 0802)  Resp: 20 (01/31/18 0750)  BP: 122/65 (01/31/18 0750)  SpO2: (!) 93 % (01/31/18 0750) Vital Signs (24h Range):  Temp:  [97.6 °F (36.4 °C)-100 °F (37.8 °C)] 98.7 °F (37.1 °C)  Pulse:  [] 100  Resp:  [16-20] 20  SpO2:  [93 %-99 %] 93 %  BP: (109-172)/() 122/65     Weight: 57.6 kg (126 lb 15.8 oz)  Body mass index is 17.22 kg/m².    Intake/Output Summary (Last 24 hours) at 01/31/18 1147  Last data filed at 01/31/18 0600   Gross per 24 hour   Intake             1575 ml   Output               50 ml   Net             1525 ml      Physical Exam   Constitutional: No distress.   Frail, Elderly   HENT:   Right Ear: External ear normal.   Left Ear: External ear normal.   Eyes: Conjunctivae are normal. Right eye exhibits no discharge. Left eye exhibits no discharge.   Neck: Neck supple.   Cardiovascular: Normal rate, regular rhythm and normal heart sounds.    Pulmonary/Chest: Effort normal and breath sounds normal. No stridor.   Abdominal: Soft. Bowel sounds are normal.   Musculoskeletal: He exhibits no tenderness.   Neurological: He is alert.   Does not answer questions or follow commands.   Skin: Skin is warm and dry. He is not diaphoretic.       Significant Labs:   BMP:   Recent Labs  Lab 01/29/18 2230 01/31/18  0554    86  86    149*  149*   K 5.7* 4.6  4.6   * 117*  117*   CO2 18* 16*  16*   * 142*  142*   CREATININE 9.5* 8.3*  8.3*   CALCIUM 8.1* 7.5*  7.5*   MG 1.5*  --      CBC:   Recent Labs  Lab 01/29/18 2230 01/31/18  0554   WBC 9.45 8.38   HGB 7.7* 7.0*   HCT 23.6* 22.2*    234       Significant Imaging: I have reviewed all pertinent imaging results/findings within the past 24 hours.

## 2018-02-01 PROBLEM — R33.9 URINARY RETENTION: Status: ACTIVE | Noted: 2018-02-01

## 2018-02-01 LAB
ALBUMIN SERPL BCP-MCNC: 1.7 G/DL
ALP SERPL-CCNC: 131 U/L
ALT SERPL W/O P-5'-P-CCNC: 14 U/L
ANION GAP SERPL CALC-SCNC: 14 MMOL/L
ANION GAP SERPL CALC-SCNC: 14 MMOL/L
AST SERPL-CCNC: 27 U/L
BASOPHILS # BLD AUTO: 0.01 K/UL
BASOPHILS NFR BLD: 0.1 %
BILIRUB SERPL-MCNC: 0.8 MG/DL
BUN SERPL-MCNC: 123 MG/DL
BUN SERPL-MCNC: 123 MG/DL
CALCIUM SERPL-MCNC: 7.7 MG/DL
CALCIUM SERPL-MCNC: 7.7 MG/DL
CHLORIDE SERPL-SCNC: 120 MMOL/L
CHLORIDE SERPL-SCNC: 120 MMOL/L
CO2 SERPL-SCNC: 18 MMOL/L
CO2 SERPL-SCNC: 18 MMOL/L
CREAT SERPL-MCNC: 7 MG/DL
CREAT SERPL-MCNC: 7 MG/DL
DIFFERENTIAL METHOD: ABNORMAL
EOSINOPHIL # BLD AUTO: 0 K/UL
EOSINOPHIL NFR BLD: 0.4 %
ERYTHROCYTE [DISTWIDTH] IN BLOOD BY AUTOMATED COUNT: 17 %
EST. GFR  (AFRICAN AMERICAN): 7 ML/MIN/1.73 M^2
EST. GFR  (AFRICAN AMERICAN): 7 ML/MIN/1.73 M^2
EST. GFR  (NON AFRICAN AMERICAN): 6 ML/MIN/1.73 M^2
EST. GFR  (NON AFRICAN AMERICAN): 6 ML/MIN/1.73 M^2
GLUCOSE SERPL-MCNC: 70 MG/DL
GLUCOSE SERPL-MCNC: 70 MG/DL
HCT VFR BLD AUTO: 20.8 %
HGB BLD-MCNC: 6.5 G/DL
LYMPHOCYTES # BLD AUTO: 1.6 K/UL
LYMPHOCYTES NFR BLD: 20.7 %
MCH RBC QN AUTO: 25.1 PG
MCHC RBC AUTO-ENTMCNC: 31.3 G/DL
MCV RBC AUTO: 80 FL
MONOCYTES # BLD AUTO: 0.6 K/UL
MONOCYTES NFR BLD: 8 %
NEUTROPHILS # BLD AUTO: 5.3 K/UL
NEUTROPHILS NFR BLD: 70.8 %
PLATELET # BLD AUTO: 214 K/UL
PMV BLD AUTO: 11.5 FL
POTASSIUM SERPL-SCNC: 4.1 MMOL/L
POTASSIUM SERPL-SCNC: 4.1 MMOL/L
PROT SERPL-MCNC: 5.4 G/DL
RBC # BLD AUTO: 2.59 M/UL
SODIUM SERPL-SCNC: 152 MMOL/L
SODIUM SERPL-SCNC: 152 MMOL/L
WBC # BLD AUTO: 7.52 K/UL

## 2018-02-01 PROCEDURE — 25000003 PHARM REV CODE 250: Performed by: HOSPITALIST

## 2018-02-01 PROCEDURE — 12000002 HC ACUTE/MED SURGE SEMI-PRIVATE ROOM

## 2018-02-01 PROCEDURE — 85025 COMPLETE CBC W/AUTO DIFF WBC: CPT

## 2018-02-01 PROCEDURE — 63600175 PHARM REV CODE 636 W HCPCS: Performed by: HOSPITALIST

## 2018-02-01 PROCEDURE — 36415 COLL VENOUS BLD VENIPUNCTURE: CPT

## 2018-02-01 PROCEDURE — 25000003 PHARM REV CODE 250: Performed by: ANESTHESIOLOGY

## 2018-02-01 PROCEDURE — C9113 INJ PANTOPRAZOLE SODIUM, VIA: HCPCS | Performed by: EMERGENCY MEDICINE

## 2018-02-01 PROCEDURE — 80053 COMPREHEN METABOLIC PANEL: CPT

## 2018-02-01 PROCEDURE — 63600175 PHARM REV CODE 636 W HCPCS: Performed by: EMERGENCY MEDICINE

## 2018-02-01 PROCEDURE — 99232 SBSQ HOSP IP/OBS MODERATE 35: CPT | Mod: ,,, | Performed by: UROLOGY

## 2018-02-01 RX ADMIN — METOPROLOL SUCCINATE 50 MG: 50 TABLET, EXTENDED RELEASE ORAL at 09:02

## 2018-02-01 RX ADMIN — PANTOPRAZOLE SODIUM 40 MG: 40 INJECTION, POWDER, FOR SOLUTION INTRAVENOUS at 09:02

## 2018-02-01 RX ADMIN — SODIUM CHLORIDE: 0.9 INJECTION, SOLUTION INTRAVENOUS at 09:02

## 2018-02-01 RX ADMIN — CEFTRIAXONE 1 G: 1 INJECTION, SOLUTION INTRAVENOUS at 09:02

## 2018-02-01 NOTE — NURSING
Repor received by VETO Reeves. The pt is lying in bed resting. His son is at the bedside. Tele monitor in progress with alarms set. Safety precautions maintained and bed locked in lowest position. Side rails up X2. Call bell and personal items within reach. Will continue to monitor pt for any changes. Side rails up X2. Call bell and personal items within reach. Will continue to monitor pt for any changes.

## 2018-02-01 NOTE — PT/OT/SLP PROGRESS
Speech Language Pathology  Discharge    Alen Marin  MRN: [id number]    Patient not seen today secondary to preference to sleep and eat lunch later. Per nursing Pt with adequate bolus prep and transport of soft solids for multiple meals. ST to sign off as this performance correlates with Pt's baseline.     Isabella Merino, CURT-SLP

## 2018-02-01 NOTE — SUBJECTIVE & OBJECTIVE
Interval History: No UOP recorded since 3pm yesterday. Light pink urine in chester bag.     Review of Systems   Unable to perform ROS    Objective:     Temp:  [97 °F (36.1 °C)-98.6 °F (37 °C)] 97.9 °F (36.6 °C)  Pulse:  [60-88] 78  Resp:  [8-20] 18  SpO2:  [96 %-100 %] 96 %  BP: (113-177)/() 124/69     Body mass index is 17.22 kg/m².            Drains     Drain                 Urethral Catheter 01/31/18 1634 Double-lumen 16 Fr. less than 1 day                Physical Exam   Vitals reviewed.  Constitutional: He appears well-developed. No distress.   HENT:   Head: Normocephalic and atraumatic.   Eyes: Right eye exhibits no discharge. Left eye exhibits no discharge. No scleral icterus.   Neck: Normal range of motion. Neck supple.   Cardiovascular: Normal rate and regular rhythm.    Pulmonary/Chest: Effort normal. No respiratory distress.   Abdominal: Soft. Bowel sounds are normal. He exhibits no distension. There is no tenderness. There is no rebound and no guarding.   Genitourinary:   Genitourinary Comments: Chester - pink urine, no clots   Skin: Skin is warm and dry. He is not diaphoretic. No erythema.         Significant Labs:    BMP:    Recent Labs  Lab 01/29/18 2230 01/31/18  0554 02/01/18  0650    149*  149* 152*  152*   K 5.7* 4.6  4.6 4.1  4.1   * 117*  117* 120*  120*   CO2 18* 16*  16* 18*  18*   * 142*  142* 123*  123*   CREATININE 9.5* 8.3*  8.3* 7.0*  7.0*   CALCIUM 8.1* 7.5*  7.5* 7.7*  7.7*       CBC:     Recent Labs  Lab 01/29/18 2230 01/31/18  0554 02/01/18  0650   WBC 9.45 8.38 7.52   HGB 7.7* 7.0* 6.5*   HCT 23.6* 22.2* 20.8*    234 214       Blood Culture: No results for input(s): LABBLOO in the last 168 hours.  Urine Culture: No results for input(s): LABURIN in the last 168 hours.  Urine Studies:   Recent Labs  Lab 01/30/18  2236   COLORU Red*   APPEARANCEUA Cloudy*   PHUR 5.0   SPECGRAV 1.015   PROTEINUA 1+*   GLUCUA Negative   KETONESU Negative    BILIRUBINUA Negative   OCCULTUA 3+*   NITRITE Negative   UROBILINOGEN Negative   LEUKOCYTESUR 3+*   RBCUA 35*   WBCUA >100*   BACTERIA Many*   SQUAMEPITHEL 5   HYALINECASTS 0       Significant Imaging:  All pertinent imaging results/findings from the past 24 hours have been reviewed.

## 2018-02-01 NOTE — PROGRESS NOTES
Patient returned to room from PACU. Patient awake, alert, oriented x 2 without c/o discomfort. Tele monitoring in progress. No apparent distress noted at this time. Robles intact, placed by urology services.

## 2018-02-01 NOTE — NURSING TRANSFER
Nursing Transfer Note      1/31/2018     Transfer To: Room 341 A; PER handoff given to NITO Thomas RN    Transfer From: PACU    Transfer via bed    Transfer with IVF KVO    Transported by: NOLA Moraes    Medicines sent: N/A    Chart send with patient: Yes    Notified: family

## 2018-02-01 NOTE — PLAN OF CARE
Problem: Kidney Disease, Chronic/End Stage Renal Disease (Adult)  Goal: Signs and Symptoms of Listed Potential Problems Will be Absent, Minimized or Managed (Kidney Disease, Chronic/End Stage Renal Disease)  Signs and symptoms of listed potential problems will be absent, minimized or managed by discharge/transition of care (reference Kidney Disease, Chronic/End Stage Renal Disease (Adult) CPG).   Outcome: Ongoing (interventions implemented as appropriate)   01/31/18 0301   Kidney Disease, Chronic/End Stage Renal Disease   Problems Assessed (Chronic Kidney Disease/ESRD) all   Problems Present (Chronic Kidney Disease/ESRD) acid-base imbalance;cardiovascular disease;electrolyte imbalance;fluid imbalance;functional decline/self-care deficit;hypertension       Problem: Infection, Risk/Actual (Adult)  Goal: Identify Related Risk Factors and Signs and Symptoms  Related risk factors and signs and symptoms are identified upon initiation of Human Response Clinical Practice Guideline (CPG)   Outcome: Ongoing (interventions implemented as appropriate)   02/01/18 0634   Infection, Risk/Actual   Related Risk Factors (Infection, Risk/Actual) age extremes;chronic illness/condition   Signs and Symptoms (Infection, Risk/Actual) lab value changes;weakness;mental/behavioral changes     Goal: Infection Prevention/Resolution  Patient will demonstrate the desired outcomes by discharge/transition of care.   Outcome: Ongoing (interventions implemented as appropriate)   02/01/18 0634   Infection, Risk/Actual (Adult)   Infection Prevention/Resolution making progress toward outcome

## 2018-02-01 NOTE — ASSESSMENT & PLAN NOTE
- Chester catheter placed in OR by Dr Price 1/31/18   - Keep chester in placed for at least one week

## 2018-02-01 NOTE — ANESTHESIA POSTPROCEDURE EVALUATION
Anesthesia Post Evaluation    Patient: Alen Marin    Procedure(s) Performed: Procedure(s) (LRB):  CYSTOSCOPY WITH RETROGRADE PYELOGRAM (Bilateral)  PLACEMENT chester (N/A)    Final Anesthesia Type: general  Patient location during evaluation: PACU  Patient participation: Yes- Able to Participate  Level of consciousness: awake and alert, oriented and awake  Post-procedure vital signs: reviewed and stable  Pain management: adequate  Airway patency: patent  PONV status at discharge: No PONV  Anesthetic complications: no      Cardiovascular status: blood pressure returned to baseline  Respiratory status: unassisted and spontaneous ventilation  Hydration status: euvolemic  Follow-up not needed.        Visit Vitals  BP (!) 139/105 (Patient Position: Lying)   Pulse 80   Temp 36.9 °C (98.5 °F) (Oral)   Resp 16   Ht 6' (1.829 m)   Wt 57.6 kg (126 lb 15.8 oz)   SpO2 96%   BMI 17.22 kg/m²       Pain/Ophelia Score: Pain Assessment Performed: Yes (2/1/2018  6:00 AM)  Presence of Pain: denies (2/1/2018  6:00 AM)  Ophelia Score: 10 (1/31/2018  5:53 PM)

## 2018-02-01 NOTE — PROGRESS NOTES
Alen Marin is a 88 y.o. male patient.    Follow for LEANA, hyperkalemia    Comfortable, no new c/o    Scheduled Meds:   acetaminophen  1,000 mg Intravenous Once    cefTRIAXone (ROCEPHIN) IVPB  1 g Intravenous Q24H    metoprolol succinate  50 mg Oral Daily    pantoprazole  40 mg Intravenous Daily       Review of patient's allergies indicates:  No Known Allergies      Vital Signs Range (Last 24H):  Temp:  [97 °F (36.1 °C)-98.6 °F (37 °C)]   Pulse:  [60-88]   Resp:  [8-20]   BP: (113-177)/()   SpO2:  [96 %-100 %]     I & O (Last 24H):  Intake/Output Summary (Last 24 hours) at 02/01/18 1057  Last data filed at 01/31/18 1753   Gross per 24 hour   Intake              500 ml   Output              100 ml   Net              400 ml           Physical Exam:  General appearance: well developed, no distress  Lungs:  clear to auscultation bilaterally and normal respiratory effort  Heart: regular rate and rhythm  Abdomen: soft, non-tender non-distented; bowel sounds normal; no masses,  no organomegaly  Extremities: no cyanosis or edema, or clubbing    Laboratory:  CBC:   Recent Labs  Lab 02/01/18  0650   WBC 7.52   RBC 2.59*   HGB 6.5*   HCT 20.8*      MCV 80*   MCH 25.1*   MCHC 31.3*     CMP:   Recent Labs  Lab 02/01/18  0650   GLU 70  70   CALCIUM 7.7*  7.7*   ALBUMIN 1.7*   PROT 5.4*   *  152*   K 4.1  4.1   CO2 18*  18*   *  120*   *  123*   CREATININE 7.0*  7.0*   ALKPHOS 131   ALT 14   AST 27   BILITOT 0.8       Imp/Plan    LEANA - post obstructive uropathy, s/p chester, creatinine improving  Hyperkalemia - resolved  Hypernatremia - free water deficit  HTN  Dementia  Anemia of chronic disease    Continue IVF w/ 1/2 NS  CMP in am            Trac T Le  2/1/2018

## 2018-02-01 NOTE — OP NOTE
DATE OF PROCEDURE:  01/31/2018.    PREOPERATIVE DIAGNOSES:  Bilateral hydronephrosis, acute renal failure, urinary   retention.    POSTOPERATIVE DIAGNOSES:  Bilateral hydronephrosis, acute renal failure, urinary   retention.    PROCEDURES PERFORMED:  Cystoscopy with bilateral retrograde pyelogram, Robles   catheter placement.    PRIMARY SURGEON:  Ari Price M.D.    ANESTHESIA:  General.    ESTIMATED BLOOD LOSS:  Minimal.    DRAINS:  An 18-Guamanian [place] tip catheter.    SPECIMENS REMOVED:  None.    INDICATIONS:  Alen Marin is an 88-year-old male who is admitted for acute   renal failure.  Renal ultrasound showed bilateral hydronephrosis and a distended   bladder.  I attempted Robles catheter at the bedside; however, I was unable to   place the Robles catheter and recommended that he undergo cystoscopy for further   evaluation.    Alen Marin was taken to the Operating Room where he was positively identified   by esther.  He was placed supine on the operating room table.  Following   induction of adequate general anesthesia, he was placed in the dorsal lithotomy   position and his external genitalia were prepped and draped in the usual sterile   fashion.     films were taken using fluoroscopy.    I then passed a 21-Guamanian rigid cystoscope per urethra.    The proximal bulbar urethra showed a little bit of narrowing consistent with   very mild urethral stricture; however, it accepted the 21-Guamanian scope.  I will   pass the scope further into the prostate.  The prostate showed lateral lobe   hypertrophy.  He showed evidence of an old TURP from likely several years ago.    He had significant regrowth of the median lobe.    Once into the bladder, the bladder was drained.  He had 1000 mL of ronald of the   purulent urine.  The bladder was cleaned with the irrigant.  Once all the   purulent had cleared, the bladder was inspected.  There were no tumors seen and   no lesions seen.  He had moderate  trabeculation noted of the prostate.  Both   ureteral orifices were identified and they were seen to be in orthotopic   position.    I then passed a 5-Macedonian open-ended catheter intubated the right ureteral   orifice and performed a gentle retrograde pyelogram.  Contrast was seen   traveling to the intramural ureter with some J-hooking noted and he had a   hydroureter proximal to this area.  I did not force contrast beyond the distal   ureter.    I then passed the cone tip catheter into the left ureteral orifice.  J-hooking   was also noted in the intramural ureter with hydroureter proximal to this area.    Once again, I did not pass contrast beyond the distal ureter.    I then used the scope to drain the bladder.  Contrast was seen traveling out of   the ureters into the bladder, so I did not feel as though he required stents.    I then passed a 0.035 inch Bentson wire through the scope into the bladder.  I   then withdrew the scope leaving the wire in place.    I then advanced an 18-Macedonian [place] tip catheter, which passed easily over the   wire into the bladder.  The bladder was further drained and final fluoroscopy   film was taken showing most the contrast drained from the uterus at this point.    His anesthesia was then reversed.  He was taken to the Recovery Room in stable   condition.      JOSE  dd: 01/31/2018 16:49:26 (CST)  td: 01/31/2018 19:42:14 (CST)  Doc ID   #[id number]  Job ID #[id number]    CC:

## 2018-02-01 NOTE — PLAN OF CARE
Problem: SLP Goal  Goal: SLP Goal  STGs  1. Pt will tolerate puree with thin liquids without overt s/s of aspiration (GOAL MET 2/1)  2. Pt will perform adequate bolus prep and A-P transport of solid trials X3. (GOAL MET PER NURSING 2/1/18)  Outcome: Outcome(s) achieved Date Met: 02/01/18  Per nursing Pt with adequate bolus prep and transport of soft solids for multiple meals.ST to sign off.

## 2018-02-01 NOTE — PROGRESS NOTES
[facility]  Urology  Progress Note    Patient Name: Alen Marin  MRN: [id number]  Admission Date: 1/29/2018  Hospital Length of Stay: 2 days  Code Status: Full Code   Attending Provider: Maria E Jaimes MD   Primary Care Physician: [facility] Medical Records    Subjective:     HPI:  Hydronephrosis  This is an 88 year old male who was sent to [facility] from a nursing facility due to elevated creatinine.  A renal ultrasound showed bilateral hydronephrosis and Urology was consulted. Nursing staff has not attempted any Chester.  He is voiding.    His son tells me there was an issue with a Chester about a month ago that required a urologist to get a Chester in place.  He is not sure what was required but he was bleeding after multiple Chester attempts.    Interval History: No UOP recorded since 3pm yesterday. Light pink urine in chester bag.     Review of Systems   Unable to perform ROS    Objective:     Temp:  [97 °F (36.1 °C)-98.6 °F (37 °C)] 97.9 °F (36.6 °C)  Pulse:  [60-88] 78  Resp:  [8-20] 18  SpO2:  [96 %-100 %] 96 %  BP: (113-177)/() 124/69     Body mass index is 17.22 kg/m².            Drains     Drain                 Urethral Catheter 01/31/18 1634 Double-lumen 16 Fr. less than 1 day                Physical Exam   Vitals reviewed.  Constitutional: He appears well-developed. No distress.   HENT:   Head: Normocephalic and atraumatic.   Eyes: Right eye exhibits no discharge. Left eye exhibits no discharge. No scleral icterus.   Neck: Normal range of motion. Neck supple.   Cardiovascular: Normal rate and regular rhythm.    Pulmonary/Chest: Effort normal. No respiratory distress.   Abdominal: Soft. Bowel sounds are normal. He exhibits no distension. There is no tenderness. There is no rebound and no guarding.   Genitourinary:   Genitourinary Comments: Chester - pink urine, no clots   Skin: Skin is warm and dry. He is not diaphoretic. No erythema.         Significant Labs:    BMP:    Recent  Labs  Lab 01/29/18 2230 01/31/18  0554 02/01/18  0650    149*  149* 152*  152*   K 5.7* 4.6  4.6 4.1  4.1   * 117*  117* 120*  120*   CO2 18* 16*  16* 18*  18*   * 142*  142* 123*  123*   CREATININE 9.5* 8.3*  8.3* 7.0*  7.0*   CALCIUM 8.1* 7.5*  7.5* 7.7*  7.7*       CBC:     Recent Labs  Lab 01/29/18 2230 01/31/18  0554 02/01/18  0650   WBC 9.45 8.38 7.52   HGB 7.7* 7.0* 6.5*   HCT 23.6* 22.2* 20.8*    234 214       Blood Culture: No results for input(s): LABBLOO in the last 168 hours.  Urine Culture: No results for input(s): LABURIN in the last 168 hours.  Urine Studies:   Recent Labs  Lab 01/30/18 2236   COLORU Red*   APPEARANCEUA Cloudy*   PHUR 5.0   SPECGRAV 1.015   PROTEINUA 1+*   GLUCUA Negative   KETONESU Negative   BILIRUBINUA Negative   OCCULTUA 3+*   NITRITE Negative   UROBILINOGEN Negative   LEUKOCYTESUR 3+*   RBCUA 35*   WBCUA >100*   BACTERIA Many*   SQUAMEPITHEL 5   HYALINECASTS 0       Significant Imaging:  All pertinent imaging results/findings from the past 24 hours have been reviewed.                  Assessment/Plan:     Urinary retention     - Chester catheter placed in OR by Dr Price 1/31/18   - Keep chester in placed for at least one week        Other hydronephrosis     - Likely related to UR   - Chester in place    - Consider recheck VINCE in 1-2 days        Acute on chronic renal insufficiency     - Chester in place   - Slow improvement            VTE Risk Mitigation         Ordered     Medium Risk of VTE  Once      01/30/18 0708     Place sequential compression device  Until discontinued      01/30/18 0708     Place TRISTAN hose  Until discontinued      01/30/18 0708          Carline Tavarez MD  Urology  [facility] [place]

## 2018-02-01 NOTE — PLAN OF CARE
Problem: Patient Care Overview  Goal: Plan of Care Review  Outcome: Ongoing (interventions implemented as appropriate)   01/31/18 1753   Coping/Psychosocial   Plan Of Care Reviewed With patient       Problem: Surgery Nonspecified (Adult)  Goal: Signs and Symptoms of Listed Potential Problems Will be Absent, Minimized or Managed (Surgery Nonspecified)  Signs and symptoms of listed potential problems will be absent, minimized or managed by discharge/transition of care (reference Surgery Nonspecified (Adult) CPG).   Outcome: Ongoing (interventions implemented as appropriate)   01/31/18 1753   Surgery Nonspecified   Problems Assessed (Surgery) all   Problems Present (Surgery) none     Goal: Anesthesia/Sedation Recovery  Outcome: Outcome(s) achieved Date Met: 01/31/18 01/31/18 1753   Goal/Outcome Evaluation   Anesthesia/Sedation Recovery criteria met for transfer

## 2018-02-02 LAB
ALBUMIN SERPL BCP-MCNC: 1.6 G/DL
ALP SERPL-CCNC: 125 U/L
ALT SERPL W/O P-5'-P-CCNC: 14 U/L
ANION GAP SERPL CALC-SCNC: 13 MMOL/L
ANION GAP SERPL CALC-SCNC: 13 MMOL/L
AST SERPL-CCNC: 26 U/L
BASOPHILS # BLD AUTO: 0.03 K/UL
BASOPHILS NFR BLD: 0.4 %
BILIRUB SERPL-MCNC: 0.6 MG/DL
BUN SERPL-MCNC: 115 MG/DL
BUN SERPL-MCNC: 115 MG/DL
CALCIUM SERPL-MCNC: 7.6 MG/DL
CALCIUM SERPL-MCNC: 7.6 MG/DL
CHLORIDE SERPL-SCNC: 121 MMOL/L
CHLORIDE SERPL-SCNC: 121 MMOL/L
CO2 SERPL-SCNC: 18 MMOL/L
CO2 SERPL-SCNC: 18 MMOL/L
CREAT SERPL-MCNC: 5.6 MG/DL
CREAT SERPL-MCNC: 5.6 MG/DL
DIFFERENTIAL METHOD: ABNORMAL
EOSINOPHIL # BLD AUTO: 0.1 K/UL
EOSINOPHIL NFR BLD: 0.7 %
ERYTHROCYTE [DISTWIDTH] IN BLOOD BY AUTOMATED COUNT: 17.1 %
EST. GFR  (AFRICAN AMERICAN): 10 ML/MIN/1.73 M^2
EST. GFR  (AFRICAN AMERICAN): 10 ML/MIN/1.73 M^2
EST. GFR  (NON AFRICAN AMERICAN): 8 ML/MIN/1.73 M^2
EST. GFR  (NON AFRICAN AMERICAN): 8 ML/MIN/1.73 M^2
GLUCOSE SERPL-MCNC: 78 MG/DL
GLUCOSE SERPL-MCNC: 78 MG/DL
HCT VFR BLD AUTO: 21.5 %
HGB BLD-MCNC: 6.8 G/DL
LYMPHOCYTES # BLD AUTO: 1.6 K/UL
LYMPHOCYTES NFR BLD: 19.9 %
MCH RBC QN AUTO: 25.8 PG
MCHC RBC AUTO-ENTMCNC: 31.6 G/DL
MCV RBC AUTO: 81 FL
MONOCYTES # BLD AUTO: 0.8 K/UL
MONOCYTES NFR BLD: 9.4 %
NEUTROPHILS # BLD AUTO: 5.7 K/UL
NEUTROPHILS NFR BLD: 69.1 %
PLATELET # BLD AUTO: 225 K/UL
PMV BLD AUTO: 11.1 FL
POTASSIUM SERPL-SCNC: 3.7 MMOL/L
POTASSIUM SERPL-SCNC: 3.7 MMOL/L
PROT SERPL-MCNC: 5.3 G/DL
RBC # BLD AUTO: 2.64 M/UL
SODIUM SERPL-SCNC: 152 MMOL/L
SODIUM SERPL-SCNC: 152 MMOL/L
WBC # BLD AUTO: 8.21 K/UL

## 2018-02-02 PROCEDURE — 36415 COLL VENOUS BLD VENIPUNCTURE: CPT

## 2018-02-02 PROCEDURE — 25000003 PHARM REV CODE 250: Performed by: HOSPITALIST

## 2018-02-02 PROCEDURE — 63600175 PHARM REV CODE 636 W HCPCS: Performed by: HOSPITALIST

## 2018-02-02 PROCEDURE — 99232 SBSQ HOSP IP/OBS MODERATE 35: CPT | Mod: ,,, | Performed by: UROLOGY

## 2018-02-02 PROCEDURE — 25000003 PHARM REV CODE 250: Performed by: INTERNAL MEDICINE

## 2018-02-02 PROCEDURE — P9021 RED BLOOD CELLS UNIT: HCPCS

## 2018-02-02 PROCEDURE — 85025 COMPLETE CBC W/AUTO DIFF WBC: CPT

## 2018-02-02 PROCEDURE — 12000002 HC ACUTE/MED SURGE SEMI-PRIVATE ROOM

## 2018-02-02 PROCEDURE — 80053 COMPREHEN METABOLIC PANEL: CPT

## 2018-02-02 PROCEDURE — C9113 INJ PANTOPRAZOLE SODIUM, VIA: HCPCS | Performed by: EMERGENCY MEDICINE

## 2018-02-02 PROCEDURE — 76000 FLUOROSCOPY <1 HR PHYS/QHP: CPT | Mod: 26,59,, | Performed by: UROLOGY

## 2018-02-02 PROCEDURE — 63600175 PHARM REV CODE 636 W HCPCS: Performed by: EMERGENCY MEDICINE

## 2018-02-02 PROCEDURE — 36430 TRANSFUSION BLD/BLD COMPNT: CPT

## 2018-02-02 RX ORDER — HYDROCODONE BITARTRATE AND ACETAMINOPHEN 500; 5 MG/1; MG/1
TABLET ORAL
Status: DISCONTINUED | OUTPATIENT
Start: 2018-02-02 | End: 2018-02-05 | Stop reason: HOSPADM

## 2018-02-02 RX ORDER — DEXTROSE MONOHYDRATE 50 MG/ML
INJECTION, SOLUTION INTRAVENOUS CONTINUOUS
Status: DISCONTINUED | OUTPATIENT
Start: 2018-02-02 | End: 2018-02-05 | Stop reason: HOSPADM

## 2018-02-02 RX ADMIN — PANTOPRAZOLE SODIUM 40 MG: 40 INJECTION, POWDER, FOR SOLUTION INTRAVENOUS at 09:02

## 2018-02-02 RX ADMIN — DEXTROSE MONOHYDRATE: 5 INJECTION, SOLUTION INTRAVENOUS at 02:02

## 2018-02-02 RX ADMIN — CEFTRIAXONE 1 G: 1 INJECTION, SOLUTION INTRAVENOUS at 02:02

## 2018-02-02 NOTE — PLAN OF CARE
Problem: Pressure Ulcer Risk (Javan Scale) (Adult,Obstetrics,Pediatric)  Goal: Identify Related Risk Factors and Signs and Symptoms  Related risk factors and signs and symptoms are identified upon initiation of Human Response Clinical Practice Guideline (CPG)   Outcome: Ongoing (interventions implemented as appropriate)   01/31/18 0301   Pressure Ulcer Risk (Javan Scale)   Related Risk Factors (Pressure Ulcer Risk (Javan Scale)) age extremes;cognitive impairment;excretions/secretions;fluid intake inadequate;mechanical forces;medical devices;mobility impaired;nutritional deficiencies     Goal: Skin Integrity  Patient will demonstrate the desired outcomes by discharge/transition of care.   Outcome: Ongoing (interventions implemented as appropriate)   02/02/18 0624   Pressure Ulcer Risk (Javan Scale) (Adult,Obstetrics,Pediatric)   Skin Integrity making progress toward outcome       Problem: Kidney Disease, Chronic/End Stage Renal Disease (Adult)  Goal: Signs and Symptoms of Listed Potential Problems Will be Absent, Minimized or Managed (Kidney Disease, Chronic/End Stage Renal Disease)  Signs and symptoms of listed potential problems will be absent, minimized or managed by discharge/transition of care (reference Kidney Disease, Chronic/End Stage Renal Disease (Adult) CPG).   Outcome: Ongoing (interventions implemented as appropriate)   01/31/18 0301   Kidney Disease, Chronic/End Stage Renal Disease   Problems Assessed (Chronic Kidney Disease/ESRD) all   Problems Present (Chronic Kidney Disease/ESRD) acid-base imbalance;cardiovascular disease;electrolyte imbalance;fluid imbalance;functional decline/self-care deficit;hypertension

## 2018-02-02 NOTE — ASSESSMENT & PLAN NOTE
- Chester catheter placed in OR 1/31/18   - Keep chester in placed for at least one week    Will sign off, call with questions

## 2018-02-02 NOTE — SUBJECTIVE & OBJECTIVE
Interval History: No problems reported with Robles.    Review of Systems   Constitutional: Negative.    HENT: Negative.    Eyes: Negative.    Respiratory: Negative for cough, chest tightness and shortness of breath.    Cardiovascular: Negative for chest pain.   Gastrointestinal: Negative.  Negative for constipation, diarrhea and nausea.   Genitourinary: Negative for hematuria.   Musculoskeletal: Negative.    Neurological: Negative.    Psychiatric/Behavioral: Negative.      Objective:     Temp:  [97.4 °F (36.3 °C)-99.6 °F (37.6 °C)] 98.2 °F (36.8 °C)  Pulse:  [] 82  Resp:  [18-19] 18  SpO2:  [90 %-100 %] 100 %  BP: (101-143)/(54-84) 111/78     Body mass index is 17.22 kg/m².            Drains     Drain                 Urethral Catheter 01/31/18 1634 Double-lumen 16 Fr. 1 day                Physical Exam   Nursing note and vitals reviewed.  Constitutional: He is oriented to person, place, and time. He appears well-developed and well-nourished.   HENT:   Head: Normocephalic.   Eyes: Conjunctivae are normal.   Neck: Normal range of motion. Neck supple. No tracheal deviation present. No thyromegaly present.   Cardiovascular: Normal rate and normal heart sounds.    Pulmonary/Chest: Effort normal and breath sounds normal. No respiratory distress. He has no wheezes.   Abdominal: Soft. Bowel sounds are normal. There is no hepatosplenomegaly. There is no tenderness. There is no rebound and no CVA tenderness. No hernia.   Genitourinary:   Genitourinary Comments: Tea colored urine   Musculoskeletal: Normal range of motion. He exhibits no edema or tenderness.   Lymphadenopathy:     He has no cervical adenopathy.   Neurological: He is alert and oriented to person, place, and time.   Skin: Skin is warm and dry. No rash noted. No erythema.     Psychiatric: He has a normal mood and affect. His behavior is normal. Judgment and thought content normal.       Significant Labs:    BMP:    Recent Labs  Lab 01/31/18  2278  02/01/18  0650 02/02/18  0426   *  149* 152*  152* 152*  152*   K 4.6  4.6 4.1  4.1 3.7  3.7   *  117* 120*  120* 121*  121*   CO2 16*  16* 18*  18* 18*  18*   *  142* 123*  123* 115*  115*   CREATININE 8.3*  8.3* 7.0*  7.0* 5.6*  5.6*   CALCIUM 7.5*  7.5* 7.7*  7.7* 7.6*  7.6*       CBC:     Recent Labs  Lab 01/31/18  0554 02/01/18  0650 02/02/18  0426   WBC 8.38 7.52 8.21   HGB 7.0* 6.5* 6.8*   HCT 22.2* 20.8* 21.5*    214 225       Blood Culture: No results for input(s): LABBLOO in the last 168 hours.  Urine Culture: No results for input(s): LABURIN in the last 168 hours.    Significant Imaging:

## 2018-02-02 NOTE — PROGRESS NOTES
[facility]  Urology  Progress Note    Patient Name: Alen Marin  MRN: [id number]  Admission Date: 1/29/2018  Hospital Length of Stay: 3 days  Code Status: Full Code   Attending Provider: Maria E Jaimes MD   Primary Care Physician: [facility] Medical Records    Subjective:     HPI:  Hydronephrosis  This is an 88 year old male who was sent to [facility] from a nursing facility due to elevated creatinine.  A renal ultrasound showed bilateral hydronephrosis and Urology was consulted. Nursing staff has not attempted any Robles.  He is voiding.    His son tells me there was an issue with a Robles about a month ago that required a urologist to get a Robles in place.  He is not sure what was required but he was bleeding after multiple Robles attempts.    Interval History: No problems reported with Robles.    Review of Systems   Constitutional: Negative.    HENT: Negative.    Eyes: Negative.    Respiratory: Negative for cough, chest tightness and shortness of breath.    Cardiovascular: Negative for chest pain.   Gastrointestinal: Negative.  Negative for constipation, diarrhea and nausea.   Genitourinary: Negative for hematuria.   Musculoskeletal: Negative.    Neurological: Negative.    Psychiatric/Behavioral: Negative.      Objective:     Temp:  [97.4 °F (36.3 °C)-99.6 °F (37.6 °C)] 98.2 °F (36.8 °C)  Pulse:  [] 82  Resp:  [18-19] 18  SpO2:  [90 %-100 %] 100 %  BP: (101-143)/(54-84) 111/78     Body mass index is 17.22 kg/m².            Drains     Drain                 Urethral Catheter 01/31/18 1634 Double-lumen 16 Fr. 1 day                Physical Exam   Nursing note and vitals reviewed.  Constitutional: He is oriented to person, place, and time. He appears well-developed and well-nourished.   HENT:   Head: Normocephalic.   Eyes: Conjunctivae are normal.   Neck: Normal range of motion. Neck supple. No tracheal deviation present. No thyromegaly present.   Cardiovascular: Normal rate and normal heart  sounds.    Pulmonary/Chest: Effort normal and breath sounds normal. No respiratory distress. He has no wheezes.   Abdominal: Soft. Bowel sounds are normal. There is no hepatosplenomegaly. There is no tenderness. There is no rebound and no CVA tenderness. No hernia.   Genitourinary:   Genitourinary Comments: Tea colored urine   Musculoskeletal: Normal range of motion. He exhibits no edema or tenderness.   Lymphadenopathy:     He has no cervical adenopathy.   Neurological: He is alert and oriented to person, place, and time.   Skin: Skin is warm and dry. No rash noted. No erythema.     Psychiatric: He has a normal mood and affect. His behavior is normal. Judgment and thought content normal.       Significant Labs:    BMP:    Recent Labs  Lab 01/31/18  0554 02/01/18  0650 02/02/18  0426   *  149* 152*  152* 152*  152*   K 4.6  4.6 4.1  4.1 3.7  3.7   *  117* 120*  120* 121*  121*   CO2 16*  16* 18*  18* 18*  18*   *  142* 123*  123* 115*  115*   CREATININE 8.3*  8.3* 7.0*  7.0* 5.6*  5.6*   CALCIUM 7.5*  7.5* 7.7*  7.7* 7.6*  7.6*       CBC:     Recent Labs  Lab 01/31/18  0554 02/01/18  0650 02/02/18  0426   WBC 8.38 7.52 8.21   HGB 7.0* 6.5* 6.8*   HCT 22.2* 20.8* 21.5*    214 225       Blood Culture: No results for input(s): LABBLOO in the last 168 hours.  Urine Culture: No results for input(s): LABURIN in the last 168 hours.    Significant Imaging:                    Assessment/Plan:     Urinary retention     - Chester catheter placed in OR 1/31/18   - Keep chester in placed for at least one week    Will sign off, call with questions        Other hydronephrosis     - Likely related to UR   - Chester in place           Acute on chronic renal insufficiency     - Chester in place   - Slow improvement            VTE Risk Mitigation         Ordered     Medium Risk of VTE  Once      01/30/18 0708     Place sequential compression device  Until discontinued      01/30/18 0708     Place  TRISTAN hose  Until discontinued      01/30/18 0708          W Vlad Price MD  Urology  [facility]

## 2018-02-02 NOTE — PROGRESS NOTES
[facility]  Adult Nutrition  Consult Note    SUMMARY     Recommendations    Recommendation/Intervention:   1. Will add 1 extra supplement/day to meet estimated energy needs via supplements   2. Monitor weight weekly   3. SLP rec pureed.  4.RD to monitor    Goals: 1) Promote weight gain 2) Patient to tolerate oral diet 3) Patient to consume >=85% of EEN x5 days  Nutrition Goal Status: progressing towards goal  Communication of RD Recs: other (comment) (care plan/notes, paged MD)    Continuum of Care Plan     D/C planning: SLP rec pureed; supplements    Reason for Assessment    Reason for Assessment: RD follow-up  Diagnosis: other (see comments) (ARF, hyperkalemia)  Relevent Medical History: HTN, HLD         General Information Comments: Patient requires assistance with meals. Not taking much solid food po but is drinking novasource renal supplements. Rec increasing to 4/day to meet needs. Patient with signs of moderate malnutrtition: LBM, (clavicle, temporal); mild to moderate fat loss; calves, triceps area, ribs    Nutrition Prescription Ordered    Current Diet Order: Dental Soft   Oral Nutrition Supplement: Novasource TID     Evaluation of Received Nutrients/Fluid Intake       Energy Calories Required: not meeting needs   Protein Required: not meeting needs      IV Fluid (mL): 1200     I/O: not fully recorded       Fluid Required: meeting needs     Tolerance:  (tolerating supplements.)    % Intake of Estimated Energy Needs:50-75%  % Meal Intake: 0-25% po; 3 supplements/day.    Nutrition Risk Screen     Nutrition Risk Screen: other (see comments) (pt refusing fluids and food)    Nutrition/Diet History     Food Preferences: yulissa    Factors Affecting Nutritional Intake: behavioral (mealtime), impaired cognitive status/motor control, decreased appetite, difficulty/impaired swallowing     Labs/Tests/Procedures/Meds    Diagnostic Test/Procedure Review: reviewed, pertinent  Pertinent Labs Reviewed:  reviewed, pertinent   Pertinent Medications Reviewed: reviewed, pertinent  Pertinent Medications Comments: IVF    Physical Findings    Overall Physical Appearance: edematous, loss of muscle mass, underweight  Tubes: other (see comments) (none)     Skin: edema, intact (Javan Score 12)    Anthropometrics    Temp: 97.8 °F (36.6 °C)     Height: 6' (182.9 cm)  Weight Method: Bed Scale  Weight: 57.6 kg (126 lb 15.8 oz)  Ideal Body Weight (IBW), Male: 178 lb     % Ideal Body Weight, Male (lb): 69.48 lb     BMI (Calculated): 16.8  BMI Grade: 16 - 16.9 protein-energy malnutrition grade II     Usual Body Weight (UBW), kg:  (yulissa - no weight records in EPIC)     Estimated/Assessed Needs    Weight Used For Calorie Calculations: 56.1 kg (123 lb 10.9 oz)         Energy Need Method: Kcal/kg (1963 - 2244)     RMR ([place] Jeor Equation): 1269      Weight Used For Protein Calculations: 56.1 kg (123 lb 10.9 oz)  Protein Requirements: 67 g     Fluid Need Method: RDA Method, other (see comments) (or per MD (UOP not recorded (ARF))       CHO Requirement: N/A     Assessment and Plan    Malnutrition of moderate degree    Related to (etiology):   Inadequate oral intake     Signs and Symptoms (as evidenced by):   1) Fluid accumulation  2) Mild/moderate muscle mass loss of bilateral temporalis     Interventions/Recommendations (treatment strategy):  See recs    Nutrition Diagnosis Status:   New              Monitor and Evaluation    Food and Nutrient Intake: energy intake, food and beverage intake  Food and Nutrient Adminstration: diet order, other (specify) (supplement order)     Physical Activity and Function: nutrition-related ADLs and IADLs  Anthropometric Measurements: weight, weight change, body mass index  Biochemical Data, Medical Tests and Procedures: electrolyte and renal panel, gastrointestinal profile, inflammatory profile, lipid profile  Nutrition-Focused Physical Findings: overall appearance, extremities, muscles and bones,  head and eyes, skin    Nutrition Risk    Level of Risk: other (see comments) (f/u 2x weekly)    Nutrition Follow-Up    RD Follow-up?: Yes

## 2018-02-03 LAB
ALBUMIN SERPL BCP-MCNC: 1.7 G/DL
ALP SERPL-CCNC: 128 U/L
ALT SERPL W/O P-5'-P-CCNC: 15 U/L
ANION GAP SERPL CALC-SCNC: 13 MMOL/L
ANION GAP SERPL CALC-SCNC: 13 MMOL/L
AST SERPL-CCNC: 28 U/L
BASOPHILS # BLD AUTO: 0.01 K/UL
BASOPHILS NFR BLD: 0.1 %
BILIRUB SERPL-MCNC: 0.9 MG/DL
BUN SERPL-MCNC: 94 MG/DL
BUN SERPL-MCNC: 94 MG/DL
CALCIUM SERPL-MCNC: 7.6 MG/DL
CALCIUM SERPL-MCNC: 7.6 MG/DL
CHLORIDE SERPL-SCNC: 117 MMOL/L
CHLORIDE SERPL-SCNC: 117 MMOL/L
CO2 SERPL-SCNC: 19 MMOL/L
CO2 SERPL-SCNC: 19 MMOL/L
CREAT SERPL-MCNC: 4.1 MG/DL
CREAT SERPL-MCNC: 4.1 MG/DL
DIFFERENTIAL METHOD: ABNORMAL
EOSINOPHIL # BLD AUTO: 0 K/UL
EOSINOPHIL NFR BLD: 0.5 %
ERYTHROCYTE [DISTWIDTH] IN BLOOD BY AUTOMATED COUNT: 16.8 %
EST. GFR  (AFRICAN AMERICAN): 14 ML/MIN/1.73 M^2
EST. GFR  (AFRICAN AMERICAN): 14 ML/MIN/1.73 M^2
EST. GFR  (NON AFRICAN AMERICAN): 12 ML/MIN/1.73 M^2
EST. GFR  (NON AFRICAN AMERICAN): 12 ML/MIN/1.73 M^2
GLUCOSE SERPL-MCNC: 101 MG/DL
GLUCOSE SERPL-MCNC: 101 MG/DL
HCT VFR BLD AUTO: 26.1 %
HGB BLD-MCNC: 8.3 G/DL
LYMPHOCYTES # BLD AUTO: 1.7 K/UL
LYMPHOCYTES NFR BLD: 19.3 %
MAGNESIUM SERPL-MCNC: 1.3 MG/DL
MCH RBC QN AUTO: 25.9 PG
MCHC RBC AUTO-ENTMCNC: 31.8 G/DL
MCV RBC AUTO: 82 FL
MONOCYTES # BLD AUTO: 0.6 K/UL
MONOCYTES NFR BLD: 7.1 %
NEUTROPHILS # BLD AUTO: 6.4 K/UL
NEUTROPHILS NFR BLD: 73 %
PLATELET # BLD AUTO: 215 K/UL
PMV BLD AUTO: 10.9 FL
POTASSIUM SERPL-SCNC: 3 MMOL/L
POTASSIUM SERPL-SCNC: 3 MMOL/L
PROT SERPL-MCNC: 5.7 G/DL
RBC # BLD AUTO: 3.2 M/UL
SODIUM SERPL-SCNC: 149 MMOL/L
SODIUM SERPL-SCNC: 149 MMOL/L
WBC # BLD AUTO: 8.7 K/UL

## 2018-02-03 PROCEDURE — 85025 COMPLETE CBC W/AUTO DIFF WBC: CPT

## 2018-02-03 PROCEDURE — 36415 COLL VENOUS BLD VENIPUNCTURE: CPT

## 2018-02-03 PROCEDURE — 80053 COMPREHEN METABOLIC PANEL: CPT

## 2018-02-03 PROCEDURE — 25000003 PHARM REV CODE 250: Performed by: INTERNAL MEDICINE

## 2018-02-03 PROCEDURE — 12000002 HC ACUTE/MED SURGE SEMI-PRIVATE ROOM

## 2018-02-03 PROCEDURE — 63600175 PHARM REV CODE 636 W HCPCS: Performed by: HOSPITALIST

## 2018-02-03 PROCEDURE — 99232 SBSQ HOSP IP/OBS MODERATE 35: CPT | Mod: ,,, | Performed by: UROLOGY

## 2018-02-03 PROCEDURE — 83735 ASSAY OF MAGNESIUM: CPT

## 2018-02-03 PROCEDURE — 25000003 PHARM REV CODE 250: Performed by: HOSPITALIST

## 2018-02-03 RX ORDER — POTASSIUM CHLORIDE 20 MEQ/15ML
40 SOLUTION ORAL ONCE
Status: COMPLETED | OUTPATIENT
Start: 2018-02-03 | End: 2018-02-03

## 2018-02-03 RX ORDER — MAGNESIUM SULFATE HEPTAHYDRATE 40 MG/ML
2 INJECTION, SOLUTION INTRAVENOUS ONCE
Status: COMPLETED | OUTPATIENT
Start: 2018-02-03 | End: 2018-02-03

## 2018-02-03 RX ORDER — MAGNESIUM SULFATE 1 G/100ML
1 INJECTION INTRAVENOUS ONCE
Status: COMPLETED | OUTPATIENT
Start: 2018-02-03 | End: 2018-02-03

## 2018-02-03 RX ORDER — PANTOPRAZOLE SODIUM 40 MG/1
40 TABLET, DELAYED RELEASE ORAL DAILY
Status: DISCONTINUED | OUTPATIENT
Start: 2018-02-03 | End: 2018-02-05 | Stop reason: HOSPADM

## 2018-02-03 RX ORDER — ATORVASTATIN CALCIUM 10 MG/1
10 TABLET, FILM COATED ORAL DAILY
Status: DISCONTINUED | OUTPATIENT
Start: 2018-02-03 | End: 2018-02-05 | Stop reason: HOSPADM

## 2018-02-03 RX ADMIN — ATORVASTATIN CALCIUM 10 MG: 10 TABLET, FILM COATED ORAL at 12:02

## 2018-02-03 RX ADMIN — PANTOPRAZOLE SODIUM 40 MG: 40 TABLET, DELAYED RELEASE ORAL at 12:02

## 2018-02-03 RX ADMIN — POTASSIUM CHLORIDE 40 MEQ: 20 SOLUTION ORAL at 12:02

## 2018-02-03 RX ADMIN — CEFTRIAXONE 1 G: 1 INJECTION, SOLUTION INTRAVENOUS at 03:02

## 2018-02-03 RX ADMIN — MAGNESIUM SULFATE IN WATER 2 G: 40 INJECTION, SOLUTION INTRAVENOUS at 05:02

## 2018-02-03 RX ADMIN — MAGNESIUM SULFATE IN DEXTROSE 1 G: 10 INJECTION, SOLUTION INTRAVENOUS at 04:02

## 2018-02-03 RX ADMIN — POTASSIUM CHLORIDE 40 MEQ: 20 SOLUTION ORAL at 06:02

## 2018-02-03 NOTE — SUBJECTIVE & OBJECTIVE
Interval History: Robles draining well. Cleared to yellow.     Review of Systems   Unable to perform ROS    Objective:     Temp:  [97.3 °F (36.3 °C)-98.5 °F (36.9 °C)] 97.7 °F (36.5 °C)  Pulse:  [] 82  Resp:  [17-20] 18  SpO2:  [87 %-100 %] 90 %  BP: ()/(51-93) 136/88     Body mass index is 17.22 kg/m².      Date 02/03/18 0700 - 02/04/18 0659   Shift 8777-0788 5586-6323 3442-1659 24 Hour Total   I  N  T  A  K  E   P.O. 120   120    Shift Total  (mL/kg) 120  (2.1)   120  (2.1)   O  U  T  P  U  T   Urine  (mL/kg/hr) 1600   1600    Shift Total  (mL/kg) 1600  (27.8)   1600  (27.8)   Weight (kg) 57.6 57.6 57.6 57.6          Drains     Drain                 Urethral Catheter 01/31/18 1634 Double-lumen 16 Fr. 2 days                Physical Exam   Vitals reviewed.  Constitutional: He appears well-developed. No distress.   HENT:   Head: Normocephalic and atraumatic.   Eyes: Right eye exhibits no discharge. Left eye exhibits no discharge. No scleral icterus.   Neck: Normal range of motion. Neck supple.   Cardiovascular: Normal rate and regular rhythm.    Pulmonary/Chest: Effort normal. No respiratory distress.   Abdominal: Soft. Bowel sounds are normal. He exhibits no distension. There is no tenderness. There is no rebound and no guarding.   Genitourinary:   Genitourinary Comments: Robles - yellow urine   Skin: Skin is warm and dry. He is not diaphoretic. No erythema.         Significant Labs:    BMP:    Recent Labs  Lab 02/01/18  0650 02/02/18  0426 02/03/18  0622   *  152* 152*  152* 149*  149*   K 4.1  4.1 3.7  3.7 3.0*  3.0*   *  120* 121*  121* 117*  117*   CO2 18*  18* 18*  18* 19*  19*   *  123* 115*  115* 94*  94*   CREATININE 7.0*  7.0* 5.6*  5.6* 4.1*  4.1*   CALCIUM 7.7*  7.7* 7.6*  7.6* 7.6*  7.6*       CBC:     Recent Labs  Lab 02/01/18  0650 02/02/18  0426 02/03/18  0622   WBC 7.52 8.21 8.70   HGB 6.5* 6.8* 8.3*   HCT 20.8* 21.5* 26.1*    225 215        Blood Culture: No results for input(s): LABBLOO in the last 168 hours.  Urine Culture: No results for input(s): LABURIN in the last 168 hours.  Urine Studies:   Recent Labs  Lab 01/30/18  2236   COLORU Red*   APPEARANCEUA Cloudy*   PHUR 5.0   SPECGRAV 1.015   PROTEINUA 1+*   GLUCUA Negative   KETONESU Negative   BILIRUBINUA Negative   OCCULTUA 3+*   NITRITE Negative   UROBILINOGEN Negative   LEUKOCYTESUR 3+*   RBCUA 35*   WBCUA >100*   BACTERIA Many*   SQUAMEPITHEL 5   HYALINECASTS 0       Significant Imaging:  All pertinent imaging results/findings from the past 24 hours have been reviewed.

## 2018-02-03 NOTE — PROGRESS NOTES
Alen Marin is a 88 y.o. male patient.    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     Temp: 98.5 °F (36.9 °C) (02/03/18 0802)  Pulse: 82 (02/03/18 0802)  Resp: 18 (02/03/18 0802)  BP: 116/62 (02/03/18 0802)  SpO2: (!) 90 % (02/03/18 0400)  Weight: 57.6 kg (126 lb 15.8 oz) (01/31/18 0502)  Height: 6' (182.9 cm) (01/30/18 1500)    Subjective:  Symptoms:  Stable.  (Denies pain).      Objective:  General Appearance:  Comfortable, ill-appearing, in no acute distress and not in pain.    Vital signs: (most recent): Blood pressure 116/62, pulse 82, temperature 98.5 °F (36.9 °C), temperature source Axillary, resp. rate 18, height 6' (1.829 m), weight 57.6 kg (126 lb 15.8 oz), SpO2 (!) 90 %.    HEENT: Normal HEENT exam.    Lungs:  Breath sounds clear to auscultation.    Heart: S1 normal and S2 normal.    Abdomen: Abdomen is soft.  Bowel sounds are normal.   There is no abdominal tenderness.     Extremities: There is no dependent edema.    Neurological: (Awake).    Skin:  Warm and dry.      Intake/Output - Last 3 Shifts       02/01 0700 - 02/02 0659 02/02 0700 - 02/03 0659 02/03 0700 - 02/04 0659    P.O.  120     I.V. (mL/kg)  1445 (25.1)     Blood  808.8     IV Piggyback  50     Total Intake(mL/kg)  2423.8 (42.1)     Urine (mL/kg/hr) 1200 (0.9) 450 (0.3) 1600 (8.9)    Stool 0 (0) 0 (0)     Total Output 1200 450  1600    Net -1200 +1973.8 -1600           Stool Occurrence 0 x 1 x         Lab Results   Component Value Date    WBC 8.70 02/03/2018    HGB 8.3 (L) 02/03/2018    HCT 26.1 (L) 02/03/2018    MCV 82 02/03/2018     02/03/2018     BMP  Lab Results   Component Value Date     (H) 02/03/2018     (H) 02/03/2018    K 3.0 (L) 02/03/2018    K 3.0 (L) 02/03/2018     (H) 02/03/2018     (H) 02/03/2018    CO2 19 (L) 02/03/2018    CO2 19 (L) 02/03/2018    BUN 94 (H) 02/03/2018    BUN 94 (H) 02/03/2018    CREATININE 4.1 (H) 02/03/2018    CREATININE 4.1 (H) 02/03/2018    CALCIUM 7.6 (L) 02/03/2018    CALCIUM 7.6 (L) 02/03/2018    ANIONGAP 13 02/03/2018    ANIONGAP 13 02/03/2018    ESTGFRAFRICA 14 (A) 02/03/2018    ESTGFRAFRICA 14 (A) 02/03/2018    EGFRNONAA 12 (A) 02/03/2018    EGFRNONAA 12 (A) 02/03/2018     Current Facility-Administered Medications   Medication    0.9%  NaCl infusion (for blood administration)    0.9%  NaCl infusion (for blood administration)    acetaminophen tablet 650 mg    atorvastatin tablet 10 mg    cefTRIAXone (ROCEPHIN) 1 g in dextrose 5 % 50 mL IVPB    dextrose 5 % infusion    diphenhydrAMINE injection 25 mg    hydromorphone (PF) injection 0.2 mg    hydromorphone (PF) injection 0.2 mg    influenza (FLUZONE HIGH-DOSE) vaccine 0.5 mL    magnesium sulfate 2g in water 50mL IVPB (premix)    magnesium sulfate in dextrose IVPB (premix) 1 g    metoclopramide HCl injection 10 mg    metoprolol succinate (TOPROL-XL) 24 hr tablet 50 mg    ondansetron injection 4 mg    pantoprazole injection 40 mg    pneumoc 13-jennifer conj-dip cr(PF) 0.5 mL    potassium chloride 10% solution 40 mEq    sodium chloride 0.9% flush 3 mL       Assessment & Plan  1.LEANA. Good uop. S/p obstruction.  Cont chester.  Cont ivfs.  2.hypernatermia. Cont d5w.  3.Hypokalemia. Replete k/mag.  4.HTN. SBP ok. Following.  5.ANemia. Ck stores.  6.Severe maln. Consider tube feeds if he doesn't eat.  Depressed  albumin.  Jessica Gomez MD   2/3/2018

## 2018-02-03 NOTE — PLAN OF CARE
Problem: Fall Risk (Adult)  Intervention: Review Medications/Identify Contributors to Fall Risk   02/03/18 0526   Safety Interventions   Medication Review/Management medications reviewed     Intervention: Patient Rounds   02/03/18 0500   Safety Interventions   Patient Rounds bed in low position;bed wheels locked;call light in reach;clutter free environment maintained;ID band on;placement of personal items at bedside;toileting offered;visualized patient     Intervention: Safety Promotion/Fall Prevention   02/03/18 0500   Safety Interventions   Safety Promotion/Fall Prevention bed alarm set;lighting adjusted;medications reviewed;room near unit station;side rails raised x 2       Goal: Identify Related Risk Factors and Signs and Symptoms  Related risk factors and signs and symptoms are identified upon initiation of Human Response Clinical Practice Guideline (CPG)   Outcome: Ongoing (interventions implemented as appropriate)   02/03/18 0526   Fall Risk   Related Risk Factors (Fall Risk) bladder function altered;polypharmacy;environment unfamiliar;gait/mobility problems   Signs and Symptoms (Fall Risk) presence of risk factors     Goal: Absence of Falls  Patient will demonstrate the desired outcomes by discharge/transition of care.   Outcome: Ongoing (interventions implemented as appropriate)   02/03/18 0526   Fall Risk (Adult)   Absence of Falls making progress toward outcome       Problem: Patient Care Overview  Goal: Plan of Care Review  Outcome: Ongoing (interventions implemented as appropriate)   02/03/18 0526   Coping/Psychosocial   Plan Of Care Reviewed With patient   Restless throughout this shift.  No complaints of pain voiced this shift.  Noted to continue to attempt to scratch at skin and pull at lines.  Easily redirected.  Remained free from falls and trauma throughout this shift.  Purposeful hourly rounding in progress.  Call bell within reach.  Will continue to monitor.

## 2018-02-03 NOTE — ASSESSMENT & PLAN NOTE
Patient with significant elevation of BUN/Creatinine  No previous labs and unable to determine if patient may have some degree of CKD.  Initially thought to be pre renal alone and started IVFs.  Appreciate Nephrology input.  Renal US showing bilateral hydronephrosis and distended bladder.   ARF probably secondary to obstructive uropathy and prerenal  Urology following and s/p cystoscopy and chester placement  UA also consistent with possible UTI.    On empiric Rocephin and urine Cx was ordered but not done  Continue IVF and chester, and monitor closely

## 2018-02-03 NOTE — ASSESSMENT & PLAN NOTE
Hyperkalemia, Metabolic Acidosis and Hyperphosphatemia secondary to renal failure.  No evidence of peaked T waves on EKG.  Resolved

## 2018-02-03 NOTE — PROGRESS NOTES
[facility]  Progress Note    Patient Name: Alen Marin  MRN: [id number]  Patient Class: IP- Inpatient   Admission Date: 1/29/2018  Length of Stay: 4 days  Attending Physician: Maria E Jaimes MD  Primary Care Provider: [facility] Medical Records        Subjective:     Principal Problem:ARF (acute renal failure)    HPI:  89 y/o male presents to the ER from NH for abnormal BUN and Creatine (8.2).  Patient has advanced dementia and unable to give history.  Called patient's son, but no answer.  History obtained per ER notes.  Approximately one month ago patient was admitted to an outside facility for subdural hematoma and acute kidney injury.  After that hospitalization patient was transferred to [facility].  Repeat labs performed 3 days ago revealed worsening renal function.  Patient reports that he feels okay but is otherwise unable to contribute to the history due to severe underlying dementia.  Patient was noted to have significant elevation of BUN/Creat upon presentation and admitted for treatment and work up.  No further history able to be obtained.    Hospital Course:  89 y/o male was sent from NH for ARF.  Initially thought to be pre renal and started on IVF's.  Renal US showing bilateral hydronephrosis and distended bladder.  Urology consulted.  Nephrology also following. Cystoscopy with bilateral retrograde pyelogram, and chester catheter placement performed on 1/31.    Interval History: No acute events overnight.    Review of Systems   Unable to perform ROS: Dementia     Objective:     Vital Signs (Most Recent):  Temp: 97.5 °F (36.4 °C) (02/03/18 0009)  Pulse: 104 (02/03/18 0009)  Resp: 18 (02/03/18 0009)  BP: (!) 159/67 (02/03/18 0009)  SpO2: (!) 87 % (02/03/18 0009) Vital Signs (24h Range):  Temp:  [97.3 °F (36.3 °C)-98.3 °F (36.8 °C)] 97.5 °F (36.4 °C)  Pulse:  [] 104  Resp:  [17-20] 18  SpO2:  [87 %-100 %] 87 %  BP: ()/(51-93) 159/67      Weight: 57.6 kg (126 lb 15.8 oz)  Body mass index is 17.22 kg/m².    Intake/Output Summary (Last 24 hours) at 02/03/18 0051  Last data filed at 02/02/18 2115   Gross per 24 hour   Intake           978.75 ml   Output             1650 ml   Net          -671.25 ml      Physical Exam   Constitutional: No distress.   Frail, Elderly   HENT:   Right Ear: External ear normal.   Left Ear: External ear normal.   Eyes: Conjunctivae are normal. Right eye exhibits no discharge. Left eye exhibits no discharge.   Neck: Neck supple.   Cardiovascular: Normal rate, regular rhythm and normal heart sounds.    Pulmonary/Chest: Effort normal and breath sounds normal. No stridor.   Abdominal: Soft. Bowel sounds are normal.   Musculoskeletal: He exhibits no tenderness.   Neurological: He is alert.   Does not answer questions or follow commands.   Skin: Skin is warm and dry. He is not diaphoretic.       Significant Labs:   BMP:     Recent Labs  Lab 02/02/18  0426   GLU 78  78   *  152*   K 3.7  3.7   *  121*   CO2 18*  18*   *  115*   CREATININE 5.6*  5.6*   CALCIUM 7.6*  7.6*     CBC:     Recent Labs  Lab 02/01/18  0650 02/02/18  0426   WBC 7.52 8.21   HGB 6.5* 6.8*   HCT 20.8* 21.5*    225       Significant Imaging: I have reviewed all pertinent imaging results/findings within the past 24 hours.    Assessment/Plan:      * ARF (acute renal failure)    Patient with significant elevation of BUN/Creat.  No previous labs and unable to determine if patient may have some degree of CKD.  Initially thought to be pre renal and started IVF's.  Appreciate Nephrology input.  Renal US showing Bilateral Hydronephrosis and distended bladder.   ARF probably secondary to obstructive uropathy.    Urology following and s/p cystoscopy and chester placement  UA also consistent with UTI.  On Rocephin.  Send UCx.        Urinary retention    As discussed above  Chester in place        Other hydronephrosis    As discussed above         Hyperlipidemia    Resume atorvastatin.        Benign essential hypertension    Continue home Toprol.  Will place parameters to make sure renal failure not secondary to episodes of hypotension.        Dementia without behavioral disturbance    Probably at baseline.          Malnutrition of moderate degree    Supplement with Nepro        Hyperphosphatemia    As discussed above        Metabolic acidosis    As discussed above, improving        Hyperkalemia    Hyperkalemia, Metabolic Acidosis and Hyperphosphatemia secondary to renal failure.  No evidence of peaked T waves on EKG.  Resolved        Anemia    Probably anemia of chronic disease, but no previous labs.  No evidence of bleeding.  H/H lower today.  Discussed with son.   Will transfuse 1 unit of PRBC today  Monitor        Acute on chronic renal insufficiency    Secondary to outlet obstruction  S/p cystoscopy with chester catheter placement  Urology and Nephrology following  Creatinine improving  Continue IVF and increase rate to match post obstructive diuresis  Continue to monitor          VTE Risk Mitigation         Ordered     Medium Risk of VTE  Once      01/30/18 0708     Place sequential compression device  Until discontinued      01/30/18 0708     Place TRISTAN hose  Until discontinued      01/30/18 0708              Maria E Jaimes MD  Department of Hospital Medicine   [facility]

## 2018-02-03 NOTE — ASSESSMENT & PLAN NOTE
Probably anemia of chronic disease, but no previous labs.  No evidence of bleeding.  H/H lower today.  Discussed with son.   Will transfuse 1 unit of PRBC today  Monitor

## 2018-02-03 NOTE — ASSESSMENT & PLAN NOTE
- Chester catheter placed in OR 1/31/18 by Dr Price   - Keep chester in placed for at least one week    Will sign off, call with questions

## 2018-02-03 NOTE — PROGRESS NOTES
[facility]  Progress Note    Patient Name: Alen Marin  MRN: [id number]  Patient Class: IP- Inpatient   Admission Date: 1/29/2018  Attending Physician: Maria E Jaimes MD  Primary Care Provider: [facility] Medical Records        Subjective:     Principal Problem:ARF (acute renal failure)    HPI:  87 y/o male presents to the ER from NH for abnormal BUN and Creatine (8.2).  Patient has advanced dementia and unable to give history.  Called patient's son, but no answer.  History obtained per ER notes.  Approximately one month ago patient was admitted to an outside facility for subdural hematoma and acute kidney injury.  After that hospitalization patient was transferred to [facility].  Repeat labs performed 3 days ago revealed worsening renal function.  Patient reports that he feels okay but is otherwise unable to contribute to the history due to severe underlying dementia.  Patient was noted to have significant elevation of BUN/Creat upon presentation and admitted for treatment and work up.  No further history able to be obtained.    Hospital Course:  Mr. Marin was sent from [facility] for ARF.  Initially, it was thought to be pre renal etiology alone and he was treated with IVF's. Further workup with renal U/S showed bilateral hydronephrosis and distended bladder.  Urology was consulted along with Nephrology. UA was markedly abnormal and there was concern for infection, and so he was also treated empirically with Rocephin for possible UTI. He underwent cystoscopy with bilateral retrograde pyelogram, and chester catheter placement on 1/31.  His renal function steadily improved and his dehydration with hyponatremia was continued with treatment with D5W. BUN/creainine peak was 136/9.5. Pt was matched with I/Os given anticipated post obstructive diuresis. Urine culture was ordered but was never appropriately cultured.    Interval History: No acute events  overnight.     Review of Systems   Unable to perform ROS: Dementia     Objective:     Vital signs: Reviewed  Weight: 57.6 kg (126 lb 15.8 oz)  Body mass index is 17.22 kg/m².  Intake/Output Summary (Last 24 hours): Reviewed     Physical Exam   Constitutional: No distress.   Frail, Elderly   HENT:   Head: Atraumatic.   Right Ear: External ear normal.   Left Ear: External ear normal.   + temporal wasting   Eyes: Conjunctivae are normal. Right eye exhibits no discharge. Left eye exhibits no discharge.   Neck: Neck supple.   Cardiovascular: Normal rate, regular rhythm and normal heart sounds.    Pulmonary/Chest: Effort normal and breath sounds normal. No stridor.   Abdominal: Soft. Bowel sounds are normal.   Musculoskeletal: He exhibits no tenderness.   Neurological: He is alert. No cranial nerve deficit.   Sometimes answers simple questions and follows some commands, oriented only to self   Skin: Skin is warm and dry. He is not diaphoretic.       Significant Labs: I have reviewed all pertinent lab results/findings within the past 24 hours.    Significant Imaging: I have reviewed all pertinent imaging results/findings within the past 24 hours.    Assessment/Plan:      * ARF (acute renal failure)    Patient with significant elevation of BUN/Creatinine  No previous labs and unable to determine if patient may have some degree of CKD.  Initially thought to be pre renal alone and started IVFs.  Appreciate Nephrology input.  Renal US showing bilateral hydronephrosis and distended bladder.   ARF probably secondary to obstructive uropathy and prerenal  Urology following and s/p cystoscopy and chester placement  UA also consistent with possible UTI.    On empiric Rocephin and urine Cx was ordered but not done  Continue IVF and chester, and monitor closely        Urinary retention    As discussed above  Chester in place        Other hydronephrosis    As discussed above        Hyperlipidemia    Resumed atorvastatin.        Benign  essential hypertension    Continue metoprolol  Parameters placed to prevent hypotension.        Dementia without behavioral disturbance    Probably at baseline.  Oriented only to self and can follow some simple commands        Malnutrition of moderate degree    Supplement with Nepro  Assist with meal         Hyperphosphatemia    As discussed above        Metabolic acidosis    As discussed above, improving        Hyperkalemia    Hyperkalemia, metabolic acidosis and hyperphosphatemia secondary to renal failure.  No evidence of peaked T waves on EKG.  Resolved        Anemia    Probably anemia of chronic disease, but no previous labs.  No evidence of bleeding.  H/H lower due to hydration as anticipated  Monitor        Acute on chronic renal insufficiency    Secondary to outlet obstruction and prerenal etiology with severe volume depletion  S/p cystoscopy with chester catheter placement and IVF  Urology and Nephrology following  Continue IVF and increase rate to match output given post obstructive diuresis  Creatinine improving  Continue to monitor          VTE Risk Mitigation         Ordered     Medium Risk of VTE  Once      01/30/18 0708     Place sequential compression device  Until discontinued      01/30/18 0708     Place TRISTAN hose  Until discontinued      01/30/18 0708              Maria E Jaimes MD  Department of Hospital Medicine   [facility]

## 2018-02-03 NOTE — PROGRESS NOTES
[facility]  Progress Note    Patient Name: Alen Marin  MRN: [id number]  Patient Class: IP- Inpatient   Admission Date: 1/29/2018  Length of Stay: 4 days  Attending Physician: Maria E Jaimes MD  Primary Care Provider: [facility] Medical Records        Subjective:     Principal Problem:ARF (acute renal failure)    HPI:  89 y/o male presents to the ER from NH for abnormal BUN and Creatine (8.2).  Patient has advanced dementia and unable to give history.  Called patient's son, but no answer.  History obtained per ER notes.  Approximately one month ago patient was admitted to an outside facility for subdural hematoma and acute kidney injury.  After that hospitalization patient was transferred to [facility].  Repeat labs performed 3 days ago revealed worsening renal function.  Patient reports that he feels okay but is otherwise unable to contribute to the history due to severe underlying dementia.  Patient was noted to have significant elevation of BUN/Creat upon presentation and admitted for treatment and work up.  No further history able to be obtained.    Hospital Course:  Mr. Marin was sent from [facility] for ARF.  Initially, it was thought to be pre renal etiology alone and he was treated with IVF's. Further workup with renal U/S showed bilateral hydronephrosis and distended bladder.  Urology was consulted along with Nephrology. UA was markedly abnormal and there was concern for infection, and so he was also treated empirically with Rocephin for possible UTI. He underwent cystoscopy with bilateral retrograde pyelogram, and chester catheter placement on 1/31.  His renal function steadily improved and his dehydration with hyponatremia was continued with treatment with D5W. BUN/creainine peak was 136/9.5. Pt was matched with I/Os given anticipated post obstructive diuresis. Urine culture was ordered but was never appropriately cultured.    Interval History:  No acute events overnight. Sitter reports patient pockets food.    Review of Systems   Unable to perform ROS: Dementia     Objective:     Vital Signs (Most Recent):  Temp: 97.7 °F (36.5 °C) (02/03/18 1201)  Pulse: 82 (02/03/18 0802)  Resp: 18 (02/03/18 1201)  BP: 136/88 (02/03/18 1201)  SpO2: (!) 90 % (02/03/18 0400) Vital Signs (24h Range):  Temp:  [97.3 °F (36.3 °C)-98.5 °F (36.9 °C)] 97.7 °F (36.5 °C)  Pulse:  [] 82  Resp:  [17-20] 18  SpO2:  [87 %-98 %] 90 %  BP: (106-159)/(60-93) 136/88     Weight: 57.6 kg (126 lb 15.8 oz)  Body mass index is 17.22 kg/m².    Intake/Output Summary (Last 24 hours) at 02/03/18 1256  Last data filed at 02/03/18 1200   Gross per 24 hour   Intake          2423.75 ml   Output             2350 ml   Net            73.75 ml      Physical Exam   Constitutional: No distress.   Frail, Elderly   HENT:   Head: Atraumatic.   Right Ear: External ear normal.   Left Ear: External ear normal.   + temporal wasting   Eyes: Conjunctivae are normal. Right eye exhibits no discharge. Left eye exhibits no discharge.   Neck: Neck supple.   Cardiovascular: Normal rate, regular rhythm and normal heart sounds.    Pulmonary/Chest: Effort normal and breath sounds normal. No stridor.   Abdominal: Soft. Bowel sounds are normal.   Musculoskeletal: He exhibits no tenderness.   Neurological: He is alert. No cranial nerve deficit.   Sometimes answers simple questions and follows some commands, oriented only to self   Skin: Skin is warm and dry. He is not diaphoretic.       Significant Labs:   BMP:     Recent Labs  Lab 02/03/18 0622     101   *  149*   K 3.0*  3.0*   *  117*   CO2 19*  19*   BUN 94*  94*   CREATININE 4.1*  4.1*   CALCIUM 7.6*  7.6*   MG 1.3*     CBC:     Recent Labs  Lab 02/02/18  0426 02/03/18  0622   WBC 8.21 8.70   HGB 6.8* 8.3*   HCT 21.5* 26.1*    215       Significant Imaging: I have reviewed all pertinent imaging results/findings within the past 24  hours.    Assessment/Plan:      * ARF (acute renal failure)    Patient with significant elevation of BUN/Creatinine  No previous labs and unable to determine if patient may have some degree of CKD.  Initially thought to be pre renal alone and started IVFs.  Appreciate Nephrology input.  Renal US showing bilateral hydronephrosis and distended bladder.   ARF probably secondary to obstructive uropathy and prerenal  Urology following and s/p cystoscopy and chester placement  UA also consistent with possible UTI.    On empiric Rocephin and urine Cx was ordered but not done  Continue IVF and chester, and monitor closely        Urinary retention    As discussed above  Chester in place        Other hydronephrosis    As discussed above        Hyperlipidemia    Resumed atorvastatin.        Benign essential hypertension    Continue metoprolol  Parameters placed to prevent hypotension.        Dementia without behavioral disturbance    Probably at baseline.  Oriented only to self and can follow some simple commands  He pockets food. Will change to pureed diet.        Malnutrition of moderate degree    Supplement with Nepro  Assist with meal and change to pureed        Hyperphosphatemia    As discussed above        Metabolic acidosis    As discussed above, improving        Hyperkalemia    Hyperkalemia, metabolic acidosis and hyperphosphatemia secondary to renal failure.  No evidence of peaked T waves on EKG.  Resolved        Anemia    Probably anemia of chronic disease, but no previous labs.  No evidence of bleeding.  H/H lower due to hydration.  Discussed with son.   s/p transfusion of 1 unit of PRBC on 2/2 with H/H increased as anticipated  Will workup   Monitor        Acute on chronic renal insufficiency    Secondary to outlet obstruction and prerenal etiology with severe volume depletion  S/p cystoscopy with chester catheter placement and IVF  Urology and Nephrology following  Continue IVF and increase rate to match output given post  obstructive diuresis  Creatinine improving  Continue to monitor          VTE Risk Mitigation         Ordered     Medium Risk of VTE  Once      01/30/18 0708     Place sequential compression device  Until discontinued      01/30/18 0708     Place TRISTAN hose  Until discontinued      01/30/18 0708              Maria E Jaimes MD  Department of Hospital Medicine   [facility]

## 2018-02-03 NOTE — ASSESSMENT & PLAN NOTE
Probably at baseline.  Oriented only to self and can follow some simple commands  He pockets food. Will change to pureed diet.

## 2018-02-03 NOTE — ASSESSMENT & PLAN NOTE
Secondary to outlet obstruction  S/p cystoscopy with chester catheter placement  Urology and Nephrology following  Creatinine improving  Continue IVF and increase rate to match post obstructive diuresis  Continue to monitor

## 2018-02-03 NOTE — PLAN OF CARE
Problem: Fall Risk (Adult)  Goal: Absence of Falls  Patient will demonstrate the desired outcomes by discharge/transition of care.   Outcome: Ongoing (interventions implemented as appropriate)   02/03/18 1209   Fall Risk (Adult)   Absence of Falls making progress toward outcome       Problem: Patient Care Overview  Goal: Plan of Care Review  Outcome: Ongoing (interventions implemented as appropriate)   02/03/18 1209   Coping/Psychosocial   Plan Of Care Reviewed With patient       Problem: Pressure Ulcer Risk (Javan Scale) (Adult,Obstetrics,Pediatric)  Goal: Skin Integrity  Patient will demonstrate the desired outcomes by discharge/transition of care.   Outcome: Ongoing (interventions implemented as appropriate)   02/03/18 1209   Pressure Ulcer Risk (Javan Scale) (Adult,Obstetrics,Pediatric)   Skin Integrity making progress toward outcome       Problem: Kidney Disease, Chronic/End Stage Renal Disease (Adult)  Goal: Signs and Symptoms of Listed Potential Problems Will be Absent, Minimized or Managed (Kidney Disease, Chronic/End Stage Renal Disease)  Signs and symptoms of listed potential problems will be absent, minimized or managed by discharge/transition of care (reference Kidney Disease, Chronic/End Stage Renal Disease (Adult) CPG).   Outcome: Ongoing (interventions implemented as appropriate)   02/03/18 1209   Kidney Disease, Chronic/End Stage Renal Disease   Problems Assessed (Chronic Kidney Disease/ESRD) all   Problems Present (Chronic Kidney Disease/ESRD) electrolyte imbalance;functional decline/self-care deficit;infection;situational response;undernutrition       Problem: Infection, Risk/Actual (Adult)  Goal: Infection Prevention/Resolution  Patient will demonstrate the desired outcomes by discharge/transition of care.   Outcome: Ongoing (interventions implemented as appropriate)   02/03/18 1209   Infection, Risk/Actual (Adult)   Infection Prevention/Resolution making progress toward outcome

## 2018-02-03 NOTE — ASSESSMENT & PLAN NOTE
Probably anemia of chronic disease, but no previous labs.  No evidence of bleeding.  H/H lower due to hydration.  Discussed with son.   s/p transfusion of 1 unit of PRBC on 2/2 with H/H increased as anticipated  Will workup   Monitor

## 2018-02-03 NOTE — ASSESSMENT & PLAN NOTE
Secondary to outlet obstruction and prerenal etiology with severe volume depletion  S/p cystoscopy with chester catheter placement and IVF  Urology and Nephrology following  Continue IVF and increase rate to match output given post obstructive diuresis  Creatinine improving  Continue to monitor

## 2018-02-03 NOTE — PROGRESS NOTES
[facility]  Urology  Progress Note    Patient Name: Alen Marin  MRN: [id number]  Admission Date: 1/29/2018  Hospital Length of Stay: 4 days  Code Status: Full Code   Attending Provider: Maria E Jaimes MD   Primary Care Physician: [facility] Medical Records    Subjective:     HPI:  Hydronephrosis  This is an 88 year old male who was sent to [facility] from a nursing facility due to elevated creatinine.  A renal ultrasound showed bilateral hydronephrosis and Urology was consulted. Nursing staff has not attempted any Robles.  He is voiding.    His son tells me there was an issue with a Robles about a month ago that required a urologist to get a Robles in place.  He is not sure what was required but he was bleeding after multiple Robles attempts.    Interval History: Robles draining well. Cleared to yellow.     Review of Systems   Unable to perform ROS    Objective:     Temp:  [97.3 °F (36.3 °C)-98.5 °F (36.9 °C)] 97.7 °F (36.5 °C)  Pulse:  [] 82  Resp:  [17-20] 18  SpO2:  [87 %-100 %] 90 %  BP: ()/(51-93) 136/88     Body mass index is 17.22 kg/m².      Date 02/03/18 0700 - 02/04/18 0659   Shift 1719-1814 8017-2994 2190-6247 24 Hour Total   I  N  T  A  K  E   P.O. 120   120    Shift Total  (mL/kg) 120  (2.1)   120  (2.1)   O  U  T  P  U  T   Urine  (mL/kg/hr) 1600   1600    Shift Total  (mL/kg) 1600  (27.8)   1600  (27.8)   Weight (kg) 57.6 57.6 57.6 57.6          Drains     Drain                 Urethral Catheter 01/31/18 1634 Double-lumen 16 Fr. 2 days                Physical Exam   Vitals reviewed.  Constitutional: He appears well-developed. No distress.   HENT:   Head: Normocephalic and atraumatic.   Eyes: Right eye exhibits no discharge. Left eye exhibits no discharge. No scleral icterus.   Neck: Normal range of motion. Neck supple.   Cardiovascular: Normal rate and regular rhythm.    Pulmonary/Chest: Effort normal. No respiratory distress.   Abdominal: Soft. Bowel sounds are normal.  He exhibits no distension. There is no tenderness. There is no rebound and no guarding.   Genitourinary:   Genitourinary Comments: Chester - yellow urine   Skin: Skin is warm and dry. He is not diaphoretic. No erythema.         Significant Labs:    BMP:    Recent Labs  Lab 02/01/18  0650 02/02/18  0426 02/03/18  0622   *  152* 152*  152* 149*  149*   K 4.1  4.1 3.7  3.7 3.0*  3.0*   *  120* 121*  121* 117*  117*   CO2 18*  18* 18*  18* 19*  19*   *  123* 115*  115* 94*  94*   CREATININE 7.0*  7.0* 5.6*  5.6* 4.1*  4.1*   CALCIUM 7.7*  7.7* 7.6*  7.6* 7.6*  7.6*       CBC:     Recent Labs  Lab 02/01/18  0650 02/02/18  0426 02/03/18  0622   WBC 7.52 8.21 8.70   HGB 6.5* 6.8* 8.3*   HCT 20.8* 21.5* 26.1*    225 215       Blood Culture: No results for input(s): LABBLOO in the last 168 hours.  Urine Culture: No results for input(s): LABURIN in the last 168 hours.  Urine Studies:   Recent Labs  Lab 01/30/18  2236   COLORU Red*   APPEARANCEUA Cloudy*   PHUR 5.0   SPECGRAV 1.015   PROTEINUA 1+*   GLUCUA Negative   KETONESU Negative   BILIRUBINUA Negative   OCCULTUA 3+*   NITRITE Negative   UROBILINOGEN Negative   LEUKOCYTESUR 3+*   RBCUA 35*   WBCUA >100*   BACTERIA Many*   SQUAMEPITHEL 5   HYALINECASTS 0       Significant Imaging:  All pertinent imaging results/findings from the past 24 hours have been reviewed.                  Assessment/Plan:     Urinary retention     - Chester catheter placed in OR 1/31/18 by Dr Price   - Keep chester in placed for at least one week    Will sign off, call with questions        Other hydronephrosis     - Likely related to UR   - Chester in place           Acute on chronic renal insufficiency     - Chester in place   - Slow improvement            VTE Risk Mitigation         Ordered     Medium Risk of VTE  Once      01/30/18 0708     Place sequential compression device  Until discontinued      01/30/18 0708     Place TRISTAN hose  Until discontinued       01/30/18 0708          Carline Tavarez MD  Urology  [facility]

## 2018-02-03 NOTE — SUBJECTIVE & OBJECTIVE
Interval History: No acute events overnight. Sitter reports patient pockets food.    Review of Systems   Unable to perform ROS: Dementia     Objective:     Vital Signs (Most Recent):  Temp: 97.7 °F (36.5 °C) (02/03/18 1201)  Pulse: 82 (02/03/18 0802)  Resp: 18 (02/03/18 1201)  BP: 136/88 (02/03/18 1201)  SpO2: (!) 90 % (02/03/18 0400) Vital Signs (24h Range):  Temp:  [97.3 °F (36.3 °C)-98.5 °F (36.9 °C)] 97.7 °F (36.5 °C)  Pulse:  [] 82  Resp:  [17-20] 18  SpO2:  [87 %-98 %] 90 %  BP: (106-159)/(60-93) 136/88     Weight: 57.6 kg (126 lb 15.8 oz)  Body mass index is 17.22 kg/m².    Intake/Output Summary (Last 24 hours) at 02/03/18 1307  Last data filed at 02/03/18 1200   Gross per 24 hour   Intake          2903.75 ml   Output             2350 ml   Net           553.75 ml      Physical Exam   Constitutional: No distress.   Frail, Elderly   HENT:   Head: Atraumatic.   Right Ear: External ear normal.   Left Ear: External ear normal.   + temporal wasting   Eyes: Conjunctivae are normal. Right eye exhibits no discharge. Left eye exhibits no discharge.   Neck: Neck supple.   Cardiovascular: Normal rate, regular rhythm and normal heart sounds.    Pulmonary/Chest: Effort normal and breath sounds normal. No stridor.   Abdominal: Soft. Bowel sounds are normal.   Musculoskeletal: He exhibits no tenderness.   Neurological: He is alert. No cranial nerve deficit.   Sometimes answers simple questions and follows some commands, oriented only to self   Skin: Skin is warm and dry. He is not diaphoretic.       Significant Labs:   BMP:     Recent Labs  Lab 02/03/18  0622     101   *  149*   K 3.0*  3.0*   *  117*   CO2 19*  19*   BUN 94*  94*   CREATININE 4.1*  4.1*   CALCIUM 7.6*  7.6*   MG 1.3*     CBC:     Recent Labs  Lab 02/02/18  0426 02/03/18  0622   WBC 8.21 8.70   HGB 6.8* 8.3*   HCT 21.5* 26.1*    215       Significant Imaging: I have reviewed all pertinent imaging results/findings  within the past 24 hours.

## 2018-02-03 NOTE — SUBJECTIVE & OBJECTIVE
Interval History: No acute events overnight.    Review of Systems   Unable to perform ROS: Dementia     Objective:     Vital Signs (Most Recent):  Temp: 97.5 °F (36.4 °C) (02/03/18 0009)  Pulse: 104 (02/03/18 0009)  Resp: 18 (02/03/18 0009)  BP: (!) 159/67 (02/03/18 0009)  SpO2: (!) 87 % (02/03/18 0009) Vital Signs (24h Range):  Temp:  [97.3 °F (36.3 °C)-98.3 °F (36.8 °C)] 97.5 °F (36.4 °C)  Pulse:  [] 104  Resp:  [17-20] 18  SpO2:  [87 %-100 %] 87 %  BP: ()/(51-93) 159/67     Weight: 57.6 kg (126 lb 15.8 oz)  Body mass index is 17.22 kg/m².    Intake/Output Summary (Last 24 hours) at 02/03/18 0051  Last data filed at 02/02/18 2115   Gross per 24 hour   Intake           978.75 ml   Output             1650 ml   Net          -671.25 ml      Physical Exam   Constitutional: No distress.   Frail, Elderly   HENT:   Right Ear: External ear normal.   Left Ear: External ear normal.   Eyes: Conjunctivae are normal. Right eye exhibits no discharge. Left eye exhibits no discharge.   Neck: Neck supple.   Cardiovascular: Normal rate, regular rhythm and normal heart sounds.    Pulmonary/Chest: Effort normal and breath sounds normal. No stridor.   Abdominal: Soft. Bowel sounds are normal.   Musculoskeletal: He exhibits no tenderness.   Neurological: He is alert.   Does not answer questions or follow commands.   Skin: Skin is warm and dry. He is not diaphoretic.       Significant Labs:   BMP:     Recent Labs  Lab 02/02/18  0426   GLU 78  78   *  152*   K 3.7  3.7   *  121*   CO2 18*  18*   *  115*   CREATININE 5.6*  5.6*   CALCIUM 7.6*  7.6*     CBC:     Recent Labs  Lab 02/01/18  0650 02/02/18  0426   WBC 7.52 8.21   HGB 6.5* 6.8*   HCT 20.8* 21.5*    225       Significant Imaging: I have reviewed all pertinent imaging results/findings within the past 24 hours.

## 2018-02-03 NOTE — NURSING
Reposition with staff assist.  Blood stain gauze and constricting Coban noted to right arm.  Right upper arm noted to present with red bruising. Site noted to present with mild warmth and edema to site.  Denies pain at this time.   Coban removed at this time.  No s/s of acute bleeding noted at this time.  Charge nurse notified. Dr. Olivas notified.  No new orders noted at this time.  Will continue to monitor.

## 2018-02-03 NOTE — SUBJECTIVE & OBJECTIVE
Interval History: No acute events overnight. Sitter reports patient pockets food.    Review of Systems   Unable to perform ROS: Dementia     Objective:     Vital Signs (Most Recent):  Temp: 97.7 °F (36.5 °C) (02/03/18 1201)  Pulse: 82 (02/03/18 0802)  Resp: 18 (02/03/18 1201)  BP: 136/88 (02/03/18 1201)  SpO2: (!) 90 % (02/03/18 0400) Vital Signs (24h Range):  Temp:  [97.3 °F (36.3 °C)-98.5 °F (36.9 °C)] 97.7 °F (36.5 °C)  Pulse:  [] 82  Resp:  [17-20] 18  SpO2:  [87 %-98 %] 90 %  BP: (106-159)/(60-93) 136/88     Weight: 57.6 kg (126 lb 15.8 oz)  Body mass index is 17.22 kg/m².    Intake/Output Summary (Last 24 hours) at 02/03/18 1256  Last data filed at 02/03/18 1200   Gross per 24 hour   Intake          2423.75 ml   Output             2350 ml   Net            73.75 ml      Physical Exam   Constitutional: No distress.   Frail, Elderly   HENT:   Head: Atraumatic.   Right Ear: External ear normal.   Left Ear: External ear normal.   + temporal wasting   Eyes: Conjunctivae are normal. Right eye exhibits no discharge. Left eye exhibits no discharge.   Neck: Neck supple.   Cardiovascular: Normal rate, regular rhythm and normal heart sounds.    Pulmonary/Chest: Effort normal and breath sounds normal. No stridor.   Abdominal: Soft. Bowel sounds are normal.   Musculoskeletal: He exhibits no tenderness.   Neurological: He is alert. No cranial nerve deficit.   Sometimes answers simple questions and follows some commands, oriented only to self   Skin: Skin is warm and dry. He is not diaphoretic.       Significant Labs:   BMP:     Recent Labs  Lab 02/03/18 0622     101   *  149*   K 3.0*  3.0*   *  117*   CO2 19*  19*   BUN 94*  94*   CREATININE 4.1*  4.1*   CALCIUM 7.6*  7.6*   MG 1.3*     CBC:     Recent Labs  Lab 02/02/18  0426 02/03/18  0622   WBC 8.21 8.70   HGB 6.8* 8.3*   HCT 21.5* 26.1*    215       Significant Imaging: I have reviewed all pertinent imaging results/findings  within the past 24 hours.

## 2018-02-03 NOTE — NURSING
Patient noted to present with A-Fib with RVR and a return to normal sinus rhythm with PAC's.  No acute distress noted.  /80, P - 87, R - 18 .  Dr. Brendon bergman. Awaiting return phone call.  Decreased edema and warmth noted to right upper arm.  Denies pain at this time.  Call bell within reach.  Will continue to monitor.

## 2018-02-03 NOTE — ASSESSMENT & PLAN NOTE
Patient with significant elevation of BUN/Creat.  No previous labs and unable to determine if patient may have some degree of CKD.  Initially thought to be pre renal and started IVF's.  Appreciate Nephrology input.  Renal US showing Bilateral Hydronephrosis and distended bladder.   ARF probably secondary to obstructive uropathy.    Urology following and s/p cystoscopy and chester placement  UA also consistent with UTI.  On Rocephin.  Send UCx.

## 2018-02-03 NOTE — NURSING
Notified MD Jaimes of patient's right upper arm bruising and swelling from a IV infiltration. MD at bedside assessing site.

## 2018-02-04 PROBLEM — E43 SEVERE PROTEIN-CALORIE MALNUTRITION: Status: ACTIVE | Noted: 2018-01-30

## 2018-02-04 PROBLEM — Z71.89 GOALS OF CARE, COUNSELING/DISCUSSION: Status: ACTIVE | Noted: 2018-02-04

## 2018-02-04 LAB
ANION GAP SERPL CALC-SCNC: 8 MMOL/L
BASOPHILS # BLD AUTO: 0.01 K/UL
BASOPHILS NFR BLD: 0.1 %
BLD PROD TYP BPU: NORMAL
BLD PROD TYP BPU: NORMAL
BLOOD UNIT EXPIRATION DATE: NORMAL
BLOOD UNIT EXPIRATION DATE: NORMAL
BLOOD UNIT TYPE CODE: 6200
BLOOD UNIT TYPE CODE: 6200
BLOOD UNIT TYPE: NORMAL
BLOOD UNIT TYPE: NORMAL
BUN SERPL-MCNC: 73 MG/DL
CALCIUM SERPL-MCNC: 7.9 MG/DL
CHLORIDE SERPL-SCNC: 116 MMOL/L
CO2 SERPL-SCNC: 21 MMOL/L
CODING SYSTEM: NORMAL
CODING SYSTEM: NORMAL
CREAT SERPL-MCNC: 3.3 MG/DL
DIFFERENTIAL METHOD: ABNORMAL
DISPENSE STATUS: NORMAL
DISPENSE STATUS: NORMAL
EOSINOPHIL # BLD AUTO: 0.1 K/UL
EOSINOPHIL NFR BLD: 0.9 %
ERYTHROCYTE [DISTWIDTH] IN BLOOD BY AUTOMATED COUNT: 17.1 %
EST. GFR  (AFRICAN AMERICAN): 18 ML/MIN/1.73 M^2
EST. GFR  (NON AFRICAN AMERICAN): 16 ML/MIN/1.73 M^2
GLUCOSE SERPL-MCNC: 74 MG/DL
HCT VFR BLD AUTO: 26.5 %
HGB BLD-MCNC: 8.4 G/DL
IRON SERPL-MCNC: 26 UG/DL
LYMPHOCYTES # BLD AUTO: 1.2 K/UL
LYMPHOCYTES NFR BLD: 15.8 %
MAGNESIUM SERPL-MCNC: 1.8 MG/DL
MCH RBC QN AUTO: 26.1 PG
MCHC RBC AUTO-ENTMCNC: 31.7 G/DL
MCV RBC AUTO: 82 FL
MONOCYTES # BLD AUTO: 0.8 K/UL
MONOCYTES NFR BLD: 9.8 %
NEUTROPHILS # BLD AUTO: 5.8 K/UL
NEUTROPHILS NFR BLD: 73 %
PHOSPHATE SERPL-MCNC: 2.8 MG/DL
PLATELET # BLD AUTO: 232 K/UL
PMV BLD AUTO: 11.2 FL
POTASSIUM SERPL-SCNC: 3.7 MMOL/L
RBC # BLD AUTO: 3.22 M/UL
SATURATED IRON: 23 %
SODIUM SERPL-SCNC: 145 MMOL/L
TOTAL IRON BINDING CAPACITY: 114 UG/DL
TRANS ERYTHROCYTES VOL PATIENT: NORMAL ML
TRANS ERYTHROCYTES VOL PATIENT: NORMAL ML
TRANSFERRIN SERPL-MCNC: 77 MG/DL
WBC # BLD AUTO: 7.87 K/UL

## 2018-02-04 PROCEDURE — 99232 SBSQ HOSP IP/OBS MODERATE 35: CPT | Mod: ,,, | Performed by: UROLOGY

## 2018-02-04 PROCEDURE — 83735 ASSAY OF MAGNESIUM: CPT

## 2018-02-04 PROCEDURE — 12000002 HC ACUTE/MED SURGE SEMI-PRIVATE ROOM

## 2018-02-04 PROCEDURE — 63600175 PHARM REV CODE 636 W HCPCS: Performed by: HOSPITALIST

## 2018-02-04 PROCEDURE — 84100 ASSAY OF PHOSPHORUS: CPT

## 2018-02-04 PROCEDURE — 25000003 PHARM REV CODE 250: Performed by: HOSPITALIST

## 2018-02-04 PROCEDURE — 82728 ASSAY OF FERRITIN: CPT

## 2018-02-04 PROCEDURE — 83540 ASSAY OF IRON: CPT

## 2018-02-04 PROCEDURE — 80048 BASIC METABOLIC PNL TOTAL CA: CPT

## 2018-02-04 PROCEDURE — 25000003 PHARM REV CODE 250: Performed by: INTERNAL MEDICINE

## 2018-02-04 PROCEDURE — 36415 COLL VENOUS BLD VENIPUNCTURE: CPT

## 2018-02-04 PROCEDURE — 85025 COMPLETE CBC W/AUTO DIFF WBC: CPT

## 2018-02-04 PROCEDURE — 63600175 PHARM REV CODE 636 W HCPCS: Performed by: ANESTHESIOLOGY

## 2018-02-04 RX ORDER — SODIUM FERRIC GLUCONATE COMPLEX IN SUCROSE 12.5 MG/ML
125 INJECTION INTRAVENOUS ONCE
Status: DISCONTINUED | OUTPATIENT
Start: 2018-02-04 | End: 2018-02-04

## 2018-02-04 RX ADMIN — DEXTROSE MONOHYDRATE: 5 INJECTION, SOLUTION INTRAVENOUS at 05:02

## 2018-02-04 RX ADMIN — DIPHENHYDRAMINE HYDROCHLORIDE 25 MG: 50 INJECTION INTRAMUSCULAR; INTRAVENOUS at 07:02

## 2018-02-04 RX ADMIN — CEFTRIAXONE 1 G: 1 INJECTION, SOLUTION INTRAVENOUS at 03:02

## 2018-02-04 RX ADMIN — PANTOPRAZOLE SODIUM 40 MG: 40 TABLET, DELAYED RELEASE ORAL at 09:02

## 2018-02-04 RX ADMIN — DEXTROSE MONOHYDRATE: 5 INJECTION, SOLUTION INTRAVENOUS at 03:02

## 2018-02-04 RX ADMIN — ATORVASTATIN CALCIUM 10 MG: 10 TABLET, FILM COATED ORAL at 09:02

## 2018-02-04 RX ADMIN — SODIUM CHLORIDE 125 MG: 9 INJECTION, SOLUTION INTRAVENOUS at 01:02

## 2018-02-04 RX ADMIN — METOPROLOL SUCCINATE 50 MG: 50 TABLET, EXTENDED RELEASE ORAL at 09:02

## 2018-02-04 NOTE — PLAN OF CARE
Problem: Fall Risk (Adult)  Goal: Identify Related Risk Factors and Signs and Symptoms  Related risk factors and signs and symptoms are identified upon initiation of Human Response Clinical Practice Guideline (CPG)   Outcome: Ongoing (interventions implemented as appropriate)   02/03/18 0526   Fall Risk   Related Risk Factors (Fall Risk) bladder function altered;polypharmacy;environment unfamiliar;gait/mobility problems   Signs and Symptoms (Fall Risk) presence of risk factors     Goal: Absence of Falls  Patient will demonstrate the desired outcomes by discharge/transition of care.   Outcome: Ongoing (interventions implemented as appropriate)   02/04/18 0109   Fall Risk (Adult)   Absence of Falls making progress toward outcome       Problem: Patient Care Overview  Goal: Plan of Care Review   02/04/18 0109   Coping/Psychosocial   Plan Of Care Reviewed With patient       Problem: Infection, Risk/Actual (Adult)  Goal: Infection Prevention/Resolution  Patient will demonstrate the desired outcomes by discharge/transition of care.   Outcome: Ongoing (interventions implemented as appropriate)   02/04/18 0109   Infection, Risk/Actual (Adult)   Infection Prevention/Resolution making progress toward outcome

## 2018-02-04 NOTE — PROGRESS NOTES
[facility]  Urology  Progress Note    Patient Name: Alen Marin  MRN: [id number]  Admission Date: 1/29/2018  Hospital Length of Stay: 5 days  Code Status: Full Code   Attending Provider: Maria E Jaimes MD   Primary Care Physician: [facility] Medical Records    Subjective:     HPI:  Hydronephrosis  This is an 88 year old male who was sent to [facility] from a nursing facility due to elevated creatinine.  A renal ultrasound showed bilateral hydronephrosis and Urology was consulted. Nursing staff has not attempted any Chester.  He is voiding.    His son tells me there was an issue with a Chester about a month ago that required a urologist to get a Chester in place.  He is not sure what was required but he was bleeding after multiple Chester attempts.    Interval History: Chester draining well. Pt denies issues with chester.     Review of Systems   Unable to perform ROS    Objective:     Temp:  [97.8 °F (36.6 °C)-98.5 °F (36.9 °C)] 98 °F (36.7 °C)  Pulse:  [75-88] 81  Resp:  [16-18] 18  SpO2:  [93 %-94 %] 93 %  BP: (117-154)/(64-93) 131/64     Body mass index is 16.92 kg/m².      Date 02/04/18 0700 - 02/05/18 0659   Shift 4668-7758 6998-6769 8524-9807 24 Hour Total   I  N  T  A  K  E   P.O. 600   600    Shift Total  (mL/kg) 600  (10.6)   600  (10.6)   O  U  T  P  U  T   Urine  (mL/kg/hr) 100   100    Shift Total  (mL/kg) 100  (1.8)   100  (1.8)   Weight (kg) 56.6 56.6 56.6 56.6          Drains     Drain                 Urethral Catheter 01/31/18 1634 Double-lumen 16 Fr. 3 days                Physical Exam   Vitals reviewed.  Constitutional: He appears well-developed. No distress.   HENT:   Head: Normocephalic and atraumatic.   Eyes: Right eye exhibits no discharge. Left eye exhibits no discharge. No scleral icterus.   Neck: Normal range of motion. Neck supple.   Cardiovascular: Normal rate and regular rhythm.    Pulmonary/Chest: Effort normal. No respiratory distress.   Abdominal: Soft. Bowel sounds are  normal. He exhibits no distension. There is no tenderness. There is no rebound and no guarding.   Genitourinary:   Genitourinary Comments: Chester - yellow urine   Skin: Skin is warm and dry. He is not diaphoretic. No erythema.         Significant Labs:    BMP:    Recent Labs  Lab 02/02/18 0426 02/03/18  0622 02/04/18  0605   *  152* 149*  149* 145   K 3.7  3.7 3.0*  3.0* 3.7   *  121* 117*  117* 116*   CO2 18*  18* 19*  19* 21*   *  115* 94*  94* 73*   CREATININE 5.6*  5.6* 4.1*  4.1* 3.3*   CALCIUM 7.6*  7.6* 7.6*  7.6* 7.9*       CBC:     Recent Labs  Lab 02/02/18 0426 02/03/18  0622 02/04/18  0605   WBC 8.21 8.70 7.87   HGB 6.8* 8.3* 8.4*   HCT 21.5* 26.1* 26.5*    215 232       Blood Culture: No results for input(s): LABBLOO in the last 168 hours.  Urine Culture: No results for input(s): LABURIN in the last 168 hours.  Urine Studies:   Recent Labs  Lab 01/30/18  2236   COLORU Red*   APPEARANCEUA Cloudy*   PHUR 5.0   SPECGRAV 1.015   PROTEINUA 1+*   GLUCUA Negative   KETONESU Negative   BILIRUBINUA Negative   OCCULTUA 3+*   NITRITE Negative   UROBILINOGEN Negative   LEUKOCYTESUR 3+*   RBCUA 35*   WBCUA >100*   BACTERIA Many*   SQUAMEPITHEL 5   HYALINECASTS 0       Significant Imaging:  All pertinent imaging results/findings from the past 24 hours have been reviewed.                  Assessment/Plan:     Urinary retention     - Chester catheter placed in OR 1/31/18 by Dr Price   - Keep chesetr in placed for at least one week    Will sign off, call with questions        Other hydronephrosis     - Likely related to UR   - Chester in place           Acute on chronic renal insufficiency     - Chester in place   - Slow improvement            VTE Risk Mitigation         Ordered     Medium Risk of VTE  Once      01/30/18 0708     Place sequential compression device  Until discontinued      01/30/18 0708     Place TRISTAN hose  Until discontinued      01/30/18 0708          Carline MAZARIEGOS  MD [place]  Urology  [facility] - [place], [place]

## 2018-02-04 NOTE — SUBJECTIVE & OBJECTIVE
Interval History: Chester draining well. Pt denies issues with chester.     Review of Systems   Unable to perform ROS    Objective:     Temp:  [97.8 °F (36.6 °C)-98.5 °F (36.9 °C)] 98 °F (36.7 °C)  Pulse:  [75-88] 81  Resp:  [16-18] 18  SpO2:  [93 %-94 %] 93 %  BP: (117-154)/(64-93) 131/64     Body mass index is 16.92 kg/m².      Date 02/04/18 0700 - 02/05/18 0659   Shift 8779-0136 5995-0953 0512-6123 24 Hour Total   I  N  T  A  K  E   P.O. 600   600    Shift Total  (mL/kg) 600  (10.6)   600  (10.6)   O  U  T  P  U  T   Urine  (mL/kg/hr) 100   100    Shift Total  (mL/kg) 100  (1.8)   100  (1.8)   Weight (kg) 56.6 56.6 56.6 56.6          Drains     Drain                 Urethral Catheter 01/31/18 1634 Double-lumen 16 Fr. 3 days                Physical Exam   Vitals reviewed.  Constitutional: He appears well-developed. No distress.   HENT:   Head: Normocephalic and atraumatic.   Eyes: Right eye exhibits no discharge. Left eye exhibits no discharge. No scleral icterus.   Neck: Normal range of motion. Neck supple.   Cardiovascular: Normal rate and regular rhythm.    Pulmonary/Chest: Effort normal. No respiratory distress.   Abdominal: Soft. Bowel sounds are normal. He exhibits no distension. There is no tenderness. There is no rebound and no guarding.   Genitourinary:   Genitourinary Comments: Chester - yellow urine   Skin: Skin is warm and dry. He is not diaphoretic. No erythema.         Significant Labs:    BMP:    Recent Labs  Lab 02/02/18  0426 02/03/18  0622 02/04/18  0605   *  152* 149*  149* 145   K 3.7  3.7 3.0*  3.0* 3.7   *  121* 117*  117* 116*   CO2 18*  18* 19*  19* 21*   *  115* 94*  94* 73*   CREATININE 5.6*  5.6* 4.1*  4.1* 3.3*   CALCIUM 7.6*  7.6* 7.6*  7.6* 7.9*       CBC:     Recent Labs  Lab 02/02/18  0426 02/03/18  0622 02/04/18  0605   WBC 8.21 8.70 7.87   HGB 6.8* 8.3* 8.4*   HCT 21.5* 26.1* 26.5*    215 232       Blood Culture: No results for input(s): JAILENE  in the last 168 hours.  Urine Culture: No results for input(s): LABURIN in the last 168 hours.  Urine Studies:   Recent Labs  Lab 01/30/18  2236   COLORU Red*   APPEARANCEUA Cloudy*   PHUR 5.0   SPECGRAV 1.015   PROTEINUA 1+*   GLUCUA Negative   KETONESU Negative   BILIRUBINUA Negative   OCCULTUA 3+*   NITRITE Negative   UROBILINOGEN Negative   LEUKOCYTESUR 3+*   RBCUA 35*   WBCUA >100*   BACTERIA Many*   SQUAMEPITHEL 5   HYALINECASTS 0       Significant Imaging:  All pertinent imaging results/findings from the past 24 hours have been reviewed.

## 2018-02-04 NOTE — PROGRESS NOTES
Alen Marin is a 88 y.o. male patient.    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     Temp: 97.8 °F (36.6 °C) (02/04/18 1628)  Pulse: 72 (02/04/18 1628)  Resp: 18 (02/04/18 1628)  BP: (!) 142/70 (02/04/18 1628)  SpO2: (!) 93 % (02/04/18 0501)  Weight: 56.6 kg (124 lb 12.5 oz) (02/04/18 0501)  Height: 6' (182.9 cm) (01/30/18 1500)    Subjective:  Symptoms:  Stable.  (Pt taking clothes off).      Objective:  General Appearance:  Comfortable, ill-appearing, in no acute distress and not in pain.    Vital signs: (most recent): Blood pressure (!) 142/70, pulse 72, temperature 97.8 °F (36.6 °C), temperature source Axillary, resp. rate 18, height 6' (1.829 m), weight 56.6 kg (124 lb 12.5 oz), SpO2 (!) 93 %.    HEENT: Normal HEENT exam.    Lungs:  Breath sounds clear to auscultation.    Heart: S1 normal and S2 normal.    Abdomen: Abdomen is soft.  Bowel sounds are normal.   There is no abdominal tenderness.     Extremities: There is no dependent edema.    Neurological: (Awake).    Skin:  Warm and dry.      Intake/Output - Last 3 Shifts       02/02 0700 - 02/03 0659 02/03 0700 - 02/04 0659 02/04 0700 - 02/05 0659    P.O. 120 600 600    I.V. (mL/kg) 1445 (25.1)      Blood 808.8      IV Piggyback 50 50     Total Intake(mL/kg) 2423.8 (42.1) 650 (11.5) 600 (10.6)    Urine (mL/kg/hr) 450 (0.3) 2975 (2.2) 1700 (3)     Stool 0 (0)  0 (0)    Total Output 450 2975 1700    Net +1973.8 -2325 -1100           Urine Occurrence   1 x    Stool Occurrence 1 x  0 x        Lab Results   Component Value Date    WBC 7.87 02/04/2018    HGB 8.4 (L) 02/04/2018    HCT 26.5 (L) 02/04/2018    MCV 82 02/04/2018     02/04/2018     BMP  Lab Results   Component Value Date     02/04/2018    K 3.7 02/04/2018     (H) 02/04/2018    CO2 21 (L) 02/04/2018    BUN 73 (H) 02/04/2018    CREATININE 3.3 (H) 02/04/2018    CALCIUM 7.9 (L) 02/04/2018    ANIONGAP 8 02/04/2018    ESTGFRAFRICA 18 (A) 02/04/2018    EGFRNONAA 16 (A) 02/04/2018     Current Facility-Administered Medications   Medication    0.9%  NaCl infusion (for blood administration)    0.9%  NaCl infusion (for blood administration)    acetaminophen tablet 650 mg    atorvastatin tablet 10 mg    cefTRIAXone (ROCEPHIN) 1 g in dextrose 5 % 50 mL IVPB    dextrose 5 % infusion    diphenhydrAMINE injection 25 mg    hydromorphone (PF) injection 0.2 mg    hydromorphone (PF) injection 0.2 mg    influenza (FLUZONE HIGH-DOSE) vaccine 0.5 mL    metoclopramide HCl injection 10 mg    metoprolol succinate (TOPROL-XL) 24 hr tablet 50 mg    ondansetron injection 4 mg    pantoprazole EC tablet 40 mg    pneumoc 13-jennifer conj-dip cr(PF) 0.5 mL    sodium chloride 0.9% flush 3 mL       Assessment & Plan  1.LEANA. Good uop. S/p obstruction.  Cont chester.  Cont ivfs.  2.hypernatermia. Cont d5w. Better.  3.Hypokalemia. Better after repletion.  4.HTN. SBP ok. Following.  5.ANemia. Iron def. IV iron x 1. Doubt pt  Eating well.  6.Severe maln. Consider tube feeds if he doesn't eat.  Depressed albumin. Add vitamins.  Jessica Gomez MD   2/4/2018

## 2018-02-05 VITALS
HEIGHT: 72 IN | DIASTOLIC BLOOD PRESSURE: 68 MMHG | RESPIRATION RATE: 17 BRPM | BODY MASS INDEX: 16.9 KG/M2 | WEIGHT: 124.75 LBS | TEMPERATURE: 98 F | SYSTOLIC BLOOD PRESSURE: 121 MMHG | HEART RATE: 68 BPM | OXYGEN SATURATION: 96 %

## 2018-02-05 PROBLEM — N17.9 ARF (ACUTE RENAL FAILURE): Status: RESOLVED | Noted: 2018-01-30 | Resolved: 2018-02-05

## 2018-02-05 PROBLEM — N18.30 STAGE 3 CHRONIC KIDNEY DISEASE: Status: ACTIVE | Noted: 2018-02-05

## 2018-02-05 LAB
ANION GAP SERPL CALC-SCNC: 12 MMOL/L
BASOPHILS # BLD AUTO: 0.01 K/UL
BASOPHILS NFR BLD: 0.1 %
BUN SERPL-MCNC: 61 MG/DL
CALCIUM SERPL-MCNC: 7.9 MG/DL
CHLORIDE SERPL-SCNC: 111 MMOL/L
CO2 SERPL-SCNC: 18 MMOL/L
CREAT SERPL-MCNC: 2.6 MG/DL
DIFFERENTIAL METHOD: ABNORMAL
EOSINOPHIL # BLD AUTO: 0.1 K/UL
EOSINOPHIL NFR BLD: 0.7 %
ERYTHROCYTE [DISTWIDTH] IN BLOOD BY AUTOMATED COUNT: 17.4 %
EST. GFR  (AFRICAN AMERICAN): 24 ML/MIN/1.73 M^2
EST. GFR  (NON AFRICAN AMERICAN): 21 ML/MIN/1.73 M^2
FERRITIN SERPL-MCNC: 1478 NG/ML
GLUCOSE SERPL-MCNC: 56 MG/DL
HCT VFR BLD AUTO: 28.1 %
HGB BLD-MCNC: 9.3 G/DL
LYMPHOCYTES # BLD AUTO: 1.3 K/UL
LYMPHOCYTES NFR BLD: 14.6 %
MAGNESIUM SERPL-MCNC: 1.6 MG/DL
MCH RBC QN AUTO: 26.8 PG
MCHC RBC AUTO-ENTMCNC: 33.1 G/DL
MCV RBC AUTO: 81 FL
MONOCYTES # BLD AUTO: 0.9 K/UL
MONOCYTES NFR BLD: 9.7 %
NEUTROPHILS # BLD AUTO: 6.8 K/UL
NEUTROPHILS NFR BLD: 75.2 %
PHOSPHATE SERPL-MCNC: 3.1 MG/DL
PLATELET # BLD AUTO: 190 K/UL
PMV BLD AUTO: 10.6 FL
POTASSIUM SERPL-SCNC: 3.6 MMOL/L
RBC # BLD AUTO: 3.47 M/UL
SODIUM SERPL-SCNC: 141 MMOL/L
WBC # BLD AUTO: 9.13 K/UL

## 2018-02-05 PROCEDURE — 85025 COMPLETE CBC W/AUTO DIFF WBC: CPT

## 2018-02-05 PROCEDURE — 84100 ASSAY OF PHOSPHORUS: CPT

## 2018-02-05 PROCEDURE — 80048 BASIC METABOLIC PNL TOTAL CA: CPT

## 2018-02-05 PROCEDURE — 83735 ASSAY OF MAGNESIUM: CPT

## 2018-02-05 PROCEDURE — 36415 COLL VENOUS BLD VENIPUNCTURE: CPT

## 2018-02-05 PROCEDURE — 25000003 PHARM REV CODE 250: Performed by: INTERNAL MEDICINE

## 2018-02-05 PROCEDURE — 25000003 PHARM REV CODE 250: Performed by: HOSPITALIST

## 2018-02-05 PROCEDURE — 63600175 PHARM REV CODE 636 W HCPCS: Performed by: ANESTHESIOLOGY

## 2018-02-05 PROCEDURE — 63600175 PHARM REV CODE 636 W HCPCS: Performed by: HOSPITALIST

## 2018-02-05 PROCEDURE — 63600175 PHARM REV CODE 636 W HCPCS: Performed by: EMERGENCY MEDICINE

## 2018-02-05 RX ORDER — CIPROFLOXACIN 250 MG/1
250 TABLET, FILM COATED ORAL EVERY 12 HOURS
Status: ON HOLD
Start: 2018-02-05 | End: 2018-03-15 | Stop reason: HOSPADM

## 2018-02-05 RX ORDER — ACETAMINOPHEN 325 MG/1
650 TABLET ORAL EVERY 6 HOURS PRN
Refills: 0 | COMMUNITY
Start: 2018-02-05

## 2018-02-05 RX ORDER — CIPROFLOXACIN 250 MG/1
250 TABLET, FILM COATED ORAL EVERY 12 HOURS
Status: DISCONTINUED | OUTPATIENT
Start: 2018-02-05 | End: 2018-02-05 | Stop reason: HOSPADM

## 2018-02-05 RX ORDER — CIPROFLOXACIN 500 MG/1
500 TABLET ORAL EVERY 24 HOURS
Status: DISCONTINUED | OUTPATIENT
Start: 2018-02-05 | End: 2018-02-05

## 2018-02-05 RX ADMIN — DEXTROSE MONOHYDRATE: 5 INJECTION, SOLUTION INTRAVENOUS at 04:02

## 2018-02-05 RX ADMIN — METOPROLOL SUCCINATE 50 MG: 50 TABLET, EXTENDED RELEASE ORAL at 09:02

## 2018-02-05 RX ADMIN — CEFTRIAXONE 1 G: 1 INJECTION, SOLUTION INTRAVENOUS at 02:02

## 2018-02-05 RX ADMIN — SODIUM CHLORIDE 125 MG: 9 INJECTION, SOLUTION INTRAVENOUS at 09:02

## 2018-02-05 RX ADMIN — ATORVASTATIN CALCIUM 10 MG: 10 TABLET, FILM COATED ORAL at 09:02

## 2018-02-05 RX ADMIN — HYDROMORPHONE HYDROCHLORIDE 0.2 MG: 2 INJECTION, SOLUTION INTRAMUSCULAR; INTRAVENOUS; SUBCUTANEOUS at 12:02

## 2018-02-05 RX ADMIN — Medication 5 ML: at 09:02

## 2018-02-05 RX ADMIN — PANTOPRAZOLE SODIUM 40 MG: 40 TABLET, DELAYED RELEASE ORAL at 09:02

## 2018-02-05 RX ADMIN — ONDANSETRON 4 MG: 2 INJECTION INTRAMUSCULAR; INTRAVENOUS at 12:02

## 2018-02-05 NOTE — NURSING
Patient ate some of breakfast meal no problems chewing and swallowing dental soft diet . Crushed his medications used vanilla pudding to give to him no problem  With swallowing . Pct x 2 gave patient a  Partial bath this am. Tolerated well. Iv site clean dry and intact without any redness or swelling. No acute change on telemetry. pallative care nurse susan monsalve rn on floor now speaking to dr rm I spoke with her about the new order for pallative care consult she was already made aware per md. Bed alarm on call light in reach and head of bed elevated side rails up x3.

## 2018-02-05 NOTE — NURSING
Bedside rounding report received from michelle arredondo rn on patients progress and updated hand off report sheet given to me. Denies pain when asked. Assessment completed and plan of care not discussed with patient oriented to room call light and place time of day with patient very confused x 3. Room close to nursing station door open easy assess in room. Bed alarm on head of bed elevated. Telemetry is on and working properly .

## 2018-02-05 NOTE — ASSESSMENT & PLAN NOTE
Probably at baseline.  Oriented only to self and can follow some simple commands  He pockets food, diet changed to pureed  Discussed natural progression of dementia and his continued expected decline  Seem goals of care discussion

## 2018-02-05 NOTE — CONSULTS
Consult Note  Palliative Care      Consult Requested By: Maria E Jaimes MD  Reason for Consult:      Goals of care and code status discussion    SUBJECTIVE:     History of Present Illness:     Chief Complaint   Patient presents with    Abnormal Lab       sent from [facility] for abnormal BUN (24) and Creatine (8.29),       CC: Abnormal Lab     HPI: This 88 y.o. Male with arthritis, high cholestrol and HTN presents to the ED for abnormal BUN and Creatine (8.29).  Approximately one month ago patient was admitted to an outside facility for subdural hematoma and acute kidney injury.  Internal medicine and nephrology H&P from that hospitalization are brought in from nursing facility today.  After that hospitalization patient was transferred to [facility].  Repeat labs performed 3 days ago revealed worsening renal function.  He has not taken any medications for his current symptoms.  Patient reports that he feels okay but is otherwise unable to contribute to the history due to severe underlying dementia.     The history is provided by the patient. No  was used.     HPI:  89 y/o male presents to the ER from NH for abnormal BUN and Creatine (8.2).  Patient has advanced dementia and unable to give history.  Called patient's son, but no answer.  History obtained per ER notes.  Approximately one month ago patient was admitted to an outside facility for subdural hematoma and acute kidney injury.  After that hospitalization patient was transferred to [facility].  Repeat labs performed 3 days ago revealed worsening renal function.  Patient reports that he feels okay but is otherwise unable to contribute to the history due to severe underlying dementia.  Patient was noted to have significant elevation of BUN/Creat upon presentation and admitted for treatment and work up.  No further history able to be obtained.     Hospital Course:  Mr. Marin was sent from [facility] for ARF.   Initially, it was thought to be pre renal etiology alone and he was treated with IVF's. Further workup with renal U/S showed bilateral hydronephrosis and distended bladder.  Urology was consulted along with Nephrology. UA was markedly abnormal and there was concern for infection, and so he was also treated empirically with Rocephin for possible UTI. He underwent cystoscopy with bilateral retrograde pyelogram, and chester catheter placement on 1/31.  His renal function steadily improved and his dehydration with hyponatremia was continued with treatment with D5W. BUN/creainine peak was 136/9.5. Pt was matched with I/Os given anticipated post obstructive diuresis. Urine culture was ordered but was never appropriately cultured.     Interval History: No acute events overnight.    He has been followed by Nephrology (LEANA on CKD) and Urology (hydronephrosis, cysto, Chester placed).      Palliative Care has been consulted to discuss goals of care and code status/advance directives.            Past Medical History:   Diagnosis Date    Alzheimer disease     Arthritis     High cholesterol     Hypertension      Past Surgical History:   Procedure Laterality Date    BACK SURGERY       History reviewed. No pertinent family history.  Social History   Substance Use Topics    Smoking status: Former Smoker    Smokeless tobacco: Never Used    Alcohol use No       Mental Status:  Awake, oriented to self, disoriented to time/place/situation, follows some simple commands, but is very slow to do so.  Shows irritability with RN assessment/interaction.    Palliative Performance Score:  30 - 40      OBJECTIVE:     Pain Assessment/syptom management:  Limited by patient's dementia  He denies pain and denies sob.  Denies any other symptoms.  Does not appear to be in pain.  No acute distress.  See MAR for medication regimen.      Decision-Making Capacity:  Patient is unable to speak on his own behalf.      Advanced Directives:  Living Will:   "NO .  Has LaPOST dated 18.  Do Not Resuscitate Status:   FULL CODE    Medical Power of : NO    Living Arrangements:  [facility] since January.      Psychosocial, Spiritual, Cultural:  Patient unable to answer any complex questions.   Patient's most important priorities:  Patient's biggest concerns/fears:  Previous death/end of life care history: All siblings .  One son  in .  One son was murdered in 2016.    Patient's goals/hopes:      ASSESSMENT/PLAN:     Patient lying supine in bed, eyes closed, appears stated age of 88 years, and looks frail and ill.  HT 6'1" and WT 56.6 kg (124 lbs 12.5 oz) with severe cachexia.  He does not open eyes on command (though son at bedside says he can do this), able to state his name, but very slow to do so.  Disoriented to time/place/situation.  He can follow commands to squeeze hands - very slow to do and is weak, and moves feet, but inconsistent.  He shows irritability with RN interaction and questions.  No obvious pain or respiratory issues.  Appears comfortable.  No acute distress.    Respiratory effort even and unlabored.  Lungs diminished.  Heart tones audible, regular.  Abdomen soft, nondistended and nontender, with + bowel sounds.  No appreciable edema noted.  VSS as charted.      Met with patient's son, Miguelito.  He is able to verbalize basic understanding of patient's condition and provide good background information, and has fair understanding of poor prognosis.   He has insight into quality of life issues.  We spent considerable time discussing patient's current illness and multiple comorbidities with expectation that patient will not improve and will continue to decline.  He states patient did reasonably well prior to ; lived at home with his wife and disabled son.  He was able to walk (used walker/cane) and wife (who also has dementia) helped him dress/bathe, but he could feed himself.  The son had sitters part " "of the day and checked on family every day.  He states his father had a fall (one among many) around Thanksgiving and had subdural hematoma.  He states he went to [facility] and then subsequently required NH placement, so family had mother and father move to [facility] where they share a room.  The disabled son (epilepsy) is now in a group home.  Son does realize patient is continuing to decline and comments on patient lying in near fetal position in bed.  He also comments that patient has had two sons predecease him and he is also the last of his siblings.  He states on son was murdered in ecember 2016 and patient found his body on the front porch.  He states this was devastating and he sees his father has had significant decline since then.      We discussed goals of care.  For time being son wants to continue having full (medical) care for patient, but is very interested in hospice.  We attempted to set up evaluation prior to discharge, but patient will be discharged soon so this will be done at [facility].  Long discussion about benefits of hospice care and comfort measures.      We also discussed code status.  Son is familiar with cpr because he is a (federal) security personnel and has had to perform cpr.  Explained newer process for cpr with Wil machine and actual outcomes based on underlying condition.  He wants to CONTINUE FULL CODE STATUS for now and will discuss with his mother (as much as she is able) and his sister Jany (who lives in [place]).  Patient already has a LaPOST dated 1/2/18.    Provided literature, "Hard Choices For [place] People", fact sheets on dementia, cpr, and nutrition and beneficial dehydration at the end of life to aid in his decision making.  Ample time was allowed for discussion of concerns and feelings.   All questions were asked and answered to his satisfaction.  Emotional support provided.  He verbalized appreciation for care and concern given.    Plan:  Educate.  Literature.  Goals of " care and code status discussion.  Support.  Consult .    Recommendations:   Family agrees to hospice evaluation (eval only, no decision on whether to start hospice or not)   Continue FULL CODE status for now.   Has LaPOST dated 1/2/18.    Thank you for this consult and the opportunity to participate in Mr. Marin's care.    Discussed above with Dr. Jaimes and SHAHNAZ Keane.      Brooklynn Fuller, BSN, RN, CCRN, CHPN   Palliative Care Nurse Coordinator   [facility]  [phone]      Time Spent:   10 minutes face to face assessment, 45 minute conference with son, 30 minutes record review, charting, team discussion.

## 2018-02-05 NOTE — PLAN OF CARE
02/05/18 1357   Final Note   Assessment Type Final Discharge Note   Discharge Disposition [facility]   What phone number can be called within the next 1-3 days to see how you are doing after discharge? [phone]     Orders received to transport pt to NH. SHAHNAZ faxed face sheet, orders, and labs to [facility] via [facility]. SHAHNAZ awaiting call report information. SHAHNAZ contacted pt son Miguelito, @ [phone] to inform pt will travel back to NH.     3:33PM- SHAHNAZ received call report information from Nishant. According to Nishant, pt will transfer to room 20B, and nurse can call report to [phone]. SHAHNAZ informed Kian, [facility] nurse met with family and hospice is strongly recommended for pt. SHAHNAZ asked for a hospice consult to be placed at NH. SHAHNAZ spoke with pt son, at pt bedside and he was agreeable to hospice. SHAHNAZ contacted [facility] @ [phone]; SHAHNAZ spoke with Vanita; transportation taken out of will call; Per Vanita ETA is within the hour. SHAHNAZ provided VETO Hernandez call report information and provided [facility] ETA.

## 2018-02-05 NOTE — PROGRESS NOTES
[facility] Medicine  Progress Note    Patient Name: Alen Marin  MRN: [id number]  Patient Class: IP- Inpatient   Admission Date: 1/29/2018  Length of Stay: 5 days  Attending Physician: Maria E Jaimes MD  Primary Care Provider: [facility] Medical Records        Subjective:     Principal Problem:ARF (acute renal failure)    HPI:  89 y/o male presents to the ER from NH for abnormal BUN and Creatine (8.2).  Patient has advanced dementia and unable to give history.  Called patient's son, but no answer.  History obtained per ER notes.  Approximately one month ago patient was admitted to an outside facility for subdural hematoma and acute kidney injury.  After that hospitalization patient was transferred to [facility].  Repeat labs performed 3 days ago revealed worsening renal function.  Patient reports that he feels okay but is otherwise unable to contribute to the history due to severe underlying dementia.  Patient was noted to have significant elevation of BUN/Creat upon presentation and admitted for treatment and work up.  No further history able to be obtained.    Hospital Course:  Mr. Marin was sent from [facility] for ARF.  Initially, it was thought to be pre renal etiology alone and he was treated with IVF's. Further workup with renal U/S showed bilateral hydronephrosis and distended bladder.  Urology was consulted along with Nephrology. UA was markedly abnormal and there was concern for infection, and so he was also treated empirically with Rocephin for possible UTI. He underwent cystoscopy with bilateral retrograde pyelogram, and chester catheter placement on 1/31.  His renal function steadily improved and his dehydration with hyponatremia was continued with treatment with D5W. BUN/creainine peak was 136/9.5. Pt was matched with I/Os given anticipated post obstructive diuresis. Urine culture was ordered but was never appropriately cultured.    Interval History:  No acute events overnight.     Review of Systems   Unable to perform ROS: Dementia     Objective:     Vital Signs (Most Recent):  Temp: 97.8 °F (36.6 °C) (02/04/18 1628)  Pulse: 72 (02/04/18 1628)  Resp: 18 (02/04/18 1628)  BP: (!) 142/70 (02/04/18 1628)  SpO2: (!) 93 % (02/04/18 0501) Vital Signs (24h Range):  Temp:  [97.8 °F (36.6 °C)-98.5 °F (36.9 °C)] 97.8 °F (36.6 °C)  Pulse:  [72-88] 72  Resp:  [16-18] 18  SpO2:  [93 %-94 %] 93 %  BP: (123-154)/(64-83) 142/70     Weight: 56.6 kg (124 lb 12.5 oz)  Body mass index is 16.92 kg/m².    Intake/Output Summary (Last 24 hours) at 02/04/18 1808  Last data filed at 02/04/18 1700   Gross per 24 hour   Intake              840 ml   Output             2600 ml   Net            -1760 ml      Physical Exam   Constitutional: No distress.   Frail, Elderly   HENT:   Head: Atraumatic.   Right Ear: External ear normal.   Left Ear: External ear normal.   + temporal wasting   Eyes: Conjunctivae are normal. Right eye exhibits no discharge. Left eye exhibits no discharge.   Neck: Neck supple.   Cardiovascular: Normal rate, regular rhythm and normal heart sounds.    Pulmonary/Chest: Effort normal and breath sounds normal. No stridor.   Abdominal: Soft. Bowel sounds are normal.   Musculoskeletal: He exhibits no tenderness.   Neurological: He is alert. No cranial nerve deficit.   Sometimes answers simple questions and follows some commands, oriented only to self   Skin: Skin is warm and dry. He is not diaphoretic.       Significant Labs:   BMP:     Recent Labs  Lab 02/04/18  0605   GLU 74      K 3.7   *   CO2 21*   BUN 73*   CREATININE 3.3*   CALCIUM 7.9*   MG 1.8     CBC:     Recent Labs  Lab 02/03/18  0622 02/04/18  0605   WBC 8.70 7.87   HGB 8.3* 8.4*   HCT 26.1* 26.5*    232       Significant Imaging: I have reviewed all pertinent imaging results/findings within the past 24 hours.    Assessment/Plan:      * ARF (acute renal failure)    Patient with significant  elevation of BUN/Creatinine  No previous labs and unable to determine if patient may have some degree of CKD.  Initially thought to be pre renal alone and started IVFs.  Appreciate Nephrology input.  Renal US showing bilateral hydronephrosis and distended bladder.   ARF probably secondary to obstructive uropathy and prerenal  Urology following and s/p cystoscopy and chester placement  UA also consistent with possible UTI.    On empiric Rocephin and urine Cx was ordered but not done  Continue IVF and chester, and monitor closely        Goals of care, counseling/discussion    Updated son via phone today on patient's current status  Recommended DNR status and selective treatment only  Discussed expected continued decline due to progressive and end stage dementia along with severe malnutrition  Will again discuss with son who plans on reaching out to others for conference  Plan on completing LaPOST soon        Urinary retention    As discussed above  Chester in place        Other hydronephrosis    As discussed above        Hyperlipidemia    Resumed atorvastatin.        Benign essential hypertension    Continue metoprolol  Parameters placed to prevent hypotension.        Dementia without behavioral disturbance    Probably at baseline.  Oriented only to self and can follow some simple commands  He pockets food, diet changed to pureed  Discussed natural progression of dementia and his continued expected decline  Seem goals of care discussion        Severe protein-calorie malnutrition    Supplement with Nepro  Assist with meal and change to pureed  Anticipate continued inadequate intake due to dementia  Recommended against feeding tube in this patient        Hyperphosphatemia    As discussed above        Metabolic acidosis    As discussed above, improving        Hyperkalemia    Hyperkalemia, metabolic acidosis and hyperphosphatemia secondary to renal failure.  No evidence of peaked T waves on EKG.  Resolved        Anemia     Probably anemia of chronic disease, but no previous labs.  No evidence of bleeding.  H/H lower due to hydration.  Discussed with son.   s/p transfusion of 1 unit of PRBC on 2/2 with H/H increased as anticipated  Workup with Fe deficiency but no overt bleeding, likely due to inadequate intake  Replete with IV Fe daily while in the hospital  Monitor        Acute on chronic renal insufficiency    Secondary to outlet obstruction and prerenal etiology with severe volume depletion  S/p cystoscopy with chester catheter placement and IVF  Urology and Nephrology following  Continue IVF and increase rate to match output given post obstructive diuresis  Creatinine improving  Continue to monitor          VTE Risk Mitigation         Ordered     Medium Risk of VTE  Once      01/30/18 0708     Place sequential compression device  Until discontinued      01/30/18 0708     Place TRISTAN hose  Until discontinued      01/30/18 0708              Maria E Jaimes MD  Department of Hospital Medicine   [facility]

## 2018-02-05 NOTE — NURSING
No acute distress awaiting patients transportation to [facility] .positioned in bed with pillow and wedge for support. No acute distress . Side rails up x 3 call light in reach and bed alarm on. Patient resting no acute distress. Robles to gravity .

## 2018-02-05 NOTE — NURSING
Called [facility] at [phone] spoke with rgeg rosenbaum lpn on patients progress and treatment while at this facility . Informed patient will be on a antibiotic cipro first dose given here by iv route next dose due tonight at bedtime. Last bowel movement was February second patient not eating or drinking very much. Has urinary tract infection will be coming to your facility with chester catheter placed by urology service cannot be removed except by urology . Right upper arm swollen ultrasound negative for dvt in that arm iv site also in  Same arm but no problems with iv site removed. Patient was given his ordered medications this morning I crushed all of his medications before giving.

## 2018-02-05 NOTE — PROGRESS NOTES
In preparation for discharge back to [facility], [place] set up WILL CALL, with Vanita at [facility] transportation [phone], Nishant at [facility] [phone] approved stretcher with [facility] on return to NH; [place] provided Vanita with all insurance information as well.  Patient will travel with  N/C 2 L.  Has chester catheter for another week.  Nurse onlly need to call [phone], give pt name and  and provide time for ..Tania Calderon RN, BSN, STN CCM  2018

## 2018-02-05 NOTE — PLAN OF CARE
[facility]     Department of Hospital Medicine     [address] [place] [address]     [phone] after hours  [phone] fax       NURSING HOME ORDERS    02/05/2018    Admit to Nursing Home:  Regular Bed         Diagnoses:  Active Hospital Problems    Diagnosis  POA    Stage 3 chronic kidney disease [N18.3]  Yes    Goals of care, counseling/discussion [Z71.89]  Not Applicable    Urinary retention [R33.9]  Yes    Other hydronephrosis [N13.39]  Yes    Anemia [D64.9]  Yes    Hyperkalemia [E87.5]  Yes    Metabolic acidosis [E87.2]  Yes    Hyperphosphatemia [E83.39]  Yes    Severe protein-calorie malnutrition [E43]  Yes    Dementia without behavioral disturbance [F03.90]  Yes     Chronic    Benign essential hypertension [I10]  Yes     Chronic    Hyperlipidemia [E78.5]  Yes     Chronic    Acute on chronic renal insufficiency [N28.9, N18.9]  Yes      Resolved Hospital Problems    Diagnosis Date Resolved POA    *ARF (acute renal failure) [N17.9] 02/05/2018 Yes     Priority: 1 - High       Patient is homebound due to:  ARF (acute renal failure)    Allergies:Review of patient's allergies indicates:  No Known Allergies    Vitals:   As per facility protocol    Diet: Pureed   Supplement:  1 can every three times a day with meals                         Type:  Nepro      Acitivities:   As tolerated    LABS:  Per facility protocol    Nursing Precautions:    - Aspiration precautions per nursing home protocol            - Total assistance with meals            -  Upright 90 degrees befor during and after meals             -  Suction at bedside          - Fall precautions per nursing home protocol   - Seizure precaution per senior care protocol   - Decubitus precautions:        -  for positioning   - Pressure reducing foam mattress   - Turn patient every two hours. Use wedge pillows to anchor patient    CONSULTS:     Nutrition to evaluate and recommend  diet    MISCELLANEOUS CARE:    Robles Care: Empty Robles bag every shift.  Change Robles every month     Routine Skin for Bedridden Patients:  Apply moisture barrier cream to all    skin folds and wet areas in perineal area daily and after baths and                           all bowel movements.        Medications: Discontinue all previous medication orders, if any. See new list below.     Alen Marin   Home Medication Instructions MONY:[id number]    Printed on:02/05/18 7959   Medication Information                      acetaminophen (TYLENOL) 325 MG tablet  Take 2 tablets (650 mg total) by mouth every 6 (six) hours as needed for Pain or Temperature greater than (or equal to 101 degree F).             atorvastatin (LIPITOR) 10 MG tablet  Take 10 mg by mouth once daily.             ciprofloxacin HCl (CIPRO) 250 MG tablet  Take 1 tablet (250 mg total) by mouth every 12 (twelve) hours. Last dose on 2/10/2018.             metoprolol succinate (TOPROL-XL) 50 MG 24 hr tablet  Take 50 mg by mouth once daily.                 Follow up:  Dr. Price (Urology) in 1 week for voiding trial.    _________________________________  Maria E Jaimes MD  02/05/2018

## 2018-02-05 NOTE — NURSING
Pain medication and antiemetic effective patient resting  No acute distress or changes on telemetry chester to gravity . Bed alarm on call light in use and head of bed elevated.side rail up x 3.

## 2018-02-05 NOTE — SUBJECTIVE & OBJECTIVE
Interval History: No acute events overnight.     Review of Systems   Unable to perform ROS: Dementia     Objective:     Vital Signs (Most Recent):  Temp: 97.8 °F (36.6 °C) (02/04/18 1628)  Pulse: 72 (02/04/18 1628)  Resp: 18 (02/04/18 1628)  BP: (!) 142/70 (02/04/18 1628)  SpO2: (!) 93 % (02/04/18 0501) Vital Signs (24h Range):  Temp:  [97.8 °F (36.6 °C)-98.5 °F (36.9 °C)] 97.8 °F (36.6 °C)  Pulse:  [72-88] 72  Resp:  [16-18] 18  SpO2:  [93 %-94 %] 93 %  BP: (123-154)/(64-83) 142/70     Weight: 56.6 kg (124 lb 12.5 oz)  Body mass index is 16.92 kg/m².    Intake/Output Summary (Last 24 hours) at 02/04/18 1808  Last data filed at 02/04/18 1700   Gross per 24 hour   Intake              840 ml   Output             2600 ml   Net            -1760 ml      Physical Exam   Constitutional: No distress.   Frail, Elderly   HENT:   Head: Atraumatic.   Right Ear: External ear normal.   Left Ear: External ear normal.   + temporal wasting   Eyes: Conjunctivae are normal. Right eye exhibits no discharge. Left eye exhibits no discharge.   Neck: Neck supple.   Cardiovascular: Normal rate, regular rhythm and normal heart sounds.    Pulmonary/Chest: Effort normal and breath sounds normal. No stridor.   Abdominal: Soft. Bowel sounds are normal.   Musculoskeletal: He exhibits no tenderness.   Neurological: He is alert. No cranial nerve deficit.   Sometimes answers simple questions and follows some commands, oriented only to self   Skin: Skin is warm and dry. He is not diaphoretic.       Significant Labs:   BMP:     Recent Labs  Lab 02/04/18  0605   GLU 74      K 3.7   *   CO2 21*   BUN 73*   CREATININE 3.3*   CALCIUM 7.9*   MG 1.8     CBC:     Recent Labs  Lab 02/03/18  0622 02/04/18  0605   WBC 8.70 7.87   HGB 8.3* 8.4*   HCT 26.1* 26.5*    232       Significant Imaging: I have reviewed all pertinent imaging results/findings within the past 24 hours.

## 2018-02-05 NOTE — PROGRESS NOTES
[place] [facility]ish[facility] in admission's says to pt back via stretcher with [facility]. [phone]..Tania Calderon RN, BSN, CASTILLO [facility]  2/5/2018    Per MDT meeting, plan  Is to send back to NH today..Tania Calderon RN, BSN, STN [facility]  2/5/2018

## 2018-02-05 NOTE — ASSESSMENT & PLAN NOTE
Probably anemia of chronic disease, but no previous labs.  No evidence of bleeding.  H/H lower due to hydration.  Discussed with son.   s/p transfusion of 1 unit of PRBC on 2/2 with H/H increased as anticipated  Workup with Fe deficiency but no overt bleeding, likely due to inadequate intake  Replete with IV Fe daily while in the hospital  Monitor

## 2018-02-05 NOTE — PROGRESS NOTES
Awake alert oriented x 1 NAD    Denies CNS ENT CP GI  RHEUM OR DERM SX    CO being cold  Past Medical History:   Diagnosis Date    Alzheimer disease     Arthritis     High cholesterol     Hypertension      Review of patient's allergies indicates:  No Known Allergies    Current Facility-Administered Medications   Medication    0.9%  NaCl infusion (for blood administration)    0.9%  NaCl infusion (for blood administration)    acetaminophen tablet 650 mg    atorvastatin tablet 10 mg    cefTRIAXone (ROCEPHIN) 1 g in dextrose 5 % 50 mL IVPB    dextrose 5 % infusion    diphenhydrAMINE injection 25 mg    hydromorphone (PF) injection 0.2 mg    hydromorphone (PF) injection 0.2 mg    influenza (FLUZONE HIGH-DOSE) vaccine 0.5 mL    metoclopramide HCl injection 10 mg    metoprolol succinate (TOPROL-XL) 24 hr tablet 50 mg    multivitamin liquid per dose 5 mL    ondansetron injection 4 mg    pantoprazole EC tablet 40 mg    pneumoc 13-jennifer conj-dip cr(PF) 0.5 mL    sodium chloride 0.9% flush 3 mL       LABS    Recent Results (from the past 24 hour(s))   CBC auto differential    Collection Time: 02/05/18  5:55 AM   Result Value Ref Range    WBC 9.13 3.90 - 12.70 K/uL    RBC 3.47 (L) 4.60 - 6.20 M/uL    Hemoglobin 9.3 (L) 14.0 - 18.0 g/dL    Hematocrit 28.1 (L) 40.0 - 54.0 %    MCV 81 (L) 82 - 98 fL    MCH 26.8 (L) 27.0 - 31.0 pg    MCHC 33.1 32.0 - 36.0 g/dL    RDW 17.4 (H) 11.5 - 14.5 %    Platelets 190 150 - 350 K/uL    MPV 10.6 9.2 - 12.9 fL    Gran # (ANC) 6.8 1.8 - 7.7 K/uL    Lymph # 1.3 1.0 - 4.8 K/uL    Mono # 0.9 0.3 - 1.0 K/uL    Eos # 0.1 0.0 - 0.5 K/uL    Baso # 0.01 0.00 - 0.20 K/uL    Gran% 75.2 (H) 38.0 - 73.0 %    Lymph% 14.6 (L) 18.0 - 48.0 %    Mono% 9.7 4.0 - 15.0 %    Eosinophil% 0.7 0.0 - 8.0 %    Basophil% 0.1 0.0 - 1.9 %    Differential Method Automated    Basic metabolic panel    Collection Time: 02/05/18  5:55 AM   Result Value Ref Range    Sodium 141 136 - 145 mmol/L    Potassium 3.6  3.5 - 5.1 mmol/L    Chloride 111 (H) 95 - 110 mmol/L    CO2 18 (L) 23 - 29 mmol/L    Glucose 56 (L) 70 - 110 mg/dL    BUN, Bld 61 (H) 8 - 23 mg/dL    Creatinine 2.6 (H) 0.5 - 1.4 mg/dL    Calcium 7.9 (L) 8.7 - 10.5 mg/dL    Anion Gap 12 8 - 16 mmol/L    eGFR if African American 24 (A) >60 mL/min/1.73 m^2    eGFR if non African American 21 (A) >60 mL/min/1.73 m^2   Magnesium    Collection Time: 02/05/18  5:55 AM   Result Value Ref Range    Magnesium 1.6 1.6 - 2.6 mg/dL   Phosphorus    Collection Time: 02/05/18  5:55 AM   Result Value Ref Range    Phosphorus 3.1 2.7 - 4.5 mg/dL   ]    I/O last 3 completed shifts:  In: 840 [P.O.:840]  Out: 3600 [Urine:3600]    Vitals:    02/04/18 2009 02/05/18 0024 02/05/18 0410 02/05/18 0817   BP: 135/85 (!) 129/97 120/87 109/71   Pulse: 80 77 77 75   Resp: 18 18 18 18   Temp: 98.5 °F (36.9 °C) 98.2 °F (36.8 °C) 97.7 °F (36.5 °C) 98.3 °F (36.8 °C)   TempSrc: Axillary Axillary Axillary Oral   SpO2: (!) 94% 97% 95% 97%   Weight:       Height:           No Jvd, Thyromegaly or Lymphadenopathy  Lungs: Fairly clear anteriorly and laterally  Cor: RRR no G or rubs  Abd: Soft benign good bowel sounds non tender  Ext: No E C C    A)    Non oliguric phyllis  Probable ckd3  Alzheimer dementia  Electrolyte imbalance  htn  Anemia  Severe malnutrition ( related to Alzheimer's)     Case discussed with Dr Jaimes, I agree with DNR/Support care only no aggressive rx

## 2018-02-05 NOTE — ASSESSMENT & PLAN NOTE
Updated son via phone today on patient's current status  Recommended DNR status and selective treatment only  Discussed expected continued decline due to progressive and end stage dementia along with severe malnutrition  Will again discuss with son who plans on reaching out to others for conference  Plan on completing LaPOST soon

## 2018-02-05 NOTE — PLAN OF CARE
Problem: Fall Risk (Adult)  Goal: Absence of Falls  Patient will demonstrate the desired outcomes by discharge/transition of care.   Outcome: Ongoing (interventions implemented as appropriate)   02/04/18 1932   Fall Risk (Adult)   Absence of Falls making progress toward outcome       Problem: Patient Care Overview  Goal: Plan of Care Review  Outcome: Ongoing (interventions implemented as appropriate)   02/04/18 1932   Coping/Psychosocial   Plan Of Care Reviewed With patient       Problem: Pressure Ulcer Risk (Javan Scale) (Adult,Obstetrics,Pediatric)  Goal: Skin Integrity  Patient will demonstrate the desired outcomes by discharge/transition of care.   Outcome: Ongoing (interventions implemented as appropriate)   02/04/18 1932   Pressure Ulcer Risk (Javan Scale) (Adult,Obstetrics,Pediatric)   Skin Integrity making progress toward outcome       Problem: Kidney Disease, Chronic/End Stage Renal Disease (Adult)  Goal: Signs and Symptoms of Listed Potential Problems Will be Absent, Minimized or Managed (Kidney Disease, Chronic/End Stage Renal Disease)  Signs and symptoms of listed potential problems will be absent, minimized or managed by discharge/transition of care (reference Kidney Disease, Chronic/End Stage Renal Disease (Adult) CPG).   Outcome: Ongoing (interventions implemented as appropriate)   02/04/18 1932   Kidney Disease, Chronic/End Stage Renal Disease   Problems Assessed (Chronic Kidney Disease/ESRD) all   Problems Present (Chronic Kidney Disease/ESRD) electrolyte imbalance;functional decline/self-care deficit;situational response;undernutrition       Problem: Infection, Risk/Actual (Adult)  Goal: Infection Prevention/Resolution  Patient will demonstrate the desired outcomes by discharge/transition of care.   Outcome: Ongoing (interventions implemented as appropriate)   02/04/18 1932   Infection, Risk/Actual (Adult)   Infection Prevention/Resolution making progress toward outcome

## 2018-02-06 NOTE — DISCHARGE SUMMARY
[facility] Medicine  Discharge Summary      Patient Name: Alen Marin  MRN: [id number]  Admission Date: 1/29/2018  Hospital Length of Stay: 6 days  Discharge Date and Time: 2/5/2018  7:12 PM  Attending Physician: Maria E Jaimes MD  Discharging Provider: Maria E Jaimes MD  Primary Care Provider: [facility] Medical Records      HPI:   87 y/o male presents to the ER from NH for abnormal BUN and Creatine (8.2).  Patient has advanced dementia and unable to give history.  Called patient's son, but no answer.  History obtained per ER notes.  Approximately one month ago patient was admitted to an outside facility for subdural hematoma and acute kidney injury.  After that hospitalization patient was transferred to [facility].  Repeat labs performed 3 days ago revealed worsening renal function.  Patient reports that he feels okay but is otherwise unable to contribute to the history due to severe underlying dementia.  Patient was noted to have significant elevation of BUN/Creat upon presentation and admitted for treatment and work up.  No further history able to be obtained.    Procedure(s) (LRB):  CYSTOSCOPY WITH RETROGRADE PYELOGRAM (Bilateral)  PLACEMENT chester (N/A)      Hospital Course:   Mr. Marin was sent from [facility] for ARF.  Initially, it was thought to be pre renal etiology alone and he was treated with IVF's. Further workup with renal U/S showed bilateral hydronephrosis and distended bladder.  Urology was consulted along with Nephrology. UA was markedly abnormal and there was concern for infection, and so he was also treated empirically with Rocephin for possible UTI. He underwent cystoscopy with bilateral retrograde pyelogram, and chester catheter placement on 1/31.  His renal function steadily improved and his dehydration with hypernatremia was continued with treatment with D5W. BUN/creainine peak was 136/9.5. His Na level normalized and his creatinine on the  day of discharge was 2.6. Pt was matched with I/Os given anticipated post obstructive diuresis. Urine culture was ordered but was never appropriately cultured. His volume status was corrected and patient likely has CKD3 as per Nephrology. Pt has advanced dementia and has minimal PO intake despite assistance due to his progressive dementia which is only expected to continue to worsen. I discussed this with his son on multiple occasions, with recommendation for DNR given in the event of a cardiac arrest it would be medical futile and would only serve to harm the patient. Additionally, I explained to the son that a feeding tube would also not benefit this patient as well and recommended against it and I did not believe he would be a candidate that GI would offer this procedure. Palliative care was consulted to give information to the family as well. Pt will need to keep chester catheter in place for a least a week post discharge and follow up with Urology for voiding trial. Empiric Cipro was substituted upon discharge given it was unclear if he had an infection and we did not have culture results to direct therapy. Pt at high risk for readmission.     Consults:   Consults         Status Ordering Provider     Inpatient consult to Nephrology  Once     Provider:  (Not yet assigned)    Completed TOAN BROWN III     Inpatient consult to Palliative Care  Once     Provider:  Brooklynn Fuller RN    Completed PANCHO SHEFFIELD     Inpatient consult to Urology  Once     Provider:  MARCY Price MD    Completed CARMELLA BERMUDEZ          Service: Hospital Medicine    Final Active Diagnoses:    Diagnosis Date Noted POA    Stage 3 chronic kidney disease [N18.3] 02/05/2018 Yes    Goals of care, counseling/discussion [Z71.89] 02/04/2018 Not Applicable    Urinary retention [R33.9] 02/01/2018 Yes    Other hydronephrosis [N13.39] 01/31/2018 Yes    Anemia [D64.9] 01/30/2018 Yes    Hyperkalemia [E87.5] 01/30/2018 Yes    Metabolic  acidosis [E87.2] 01/30/2018 Yes    Hyperphosphatemia [E83.39] 01/30/2018 Yes    Severe protein-calorie malnutrition [E43] 01/30/2018 Yes    Dementia without behavioral disturbance [F03.90] 01/30/2018 Yes     Chronic    Benign essential hypertension [I10] 01/30/2018 Yes     Chronic    Hyperlipidemia [E78.5] 01/30/2018 Yes     Chronic    Acute on chronic renal insufficiency [N28.9, N18.9] 01/30/2018 Yes      Problems Resolved During this Admission:    Diagnosis Date Noted Date Resolved POA    PRINCIPAL PROBLEM:  ARF (acute renal failure) [N17.9] 01/30/2018 02/05/2018 Yes       Discharged Condition: stable    Disposition: Skilled Nursing Facility    Follow Up:  Follow-up Information     W Vlad Price MD. Schedule an appointment as soon as possible for a visit in 1 week.    Specialty:  Urology  Why:  Robles Removal  Contact information:  [address] [place], [place] [id number] [phone]                 Patient Instructions:     Diet Adult Regular   Order Comments: Pureed diet with thin liquids     Activity as tolerated       Significant Diagnostic Studies:   Renal U/S (1/30/2018):  Bilateral hydronephrosis with debris within a distended bladder.  Bladder outlet obstruction not excluded.  Correlation advised.    Pending Diagnostic Studies:     None         Medications:  Reconciled Home Medications:   Discharge Medication List as of 2/5/2018  3:35 PM      START taking these medications    Details   acetaminophen (TYLENOL) 325 MG tablet Take 2 tablets (650 mg total) by mouth every 6 (six) hours as needed for Pain or Temperature greater than (or equal to 101 degree F)., Starting Mon 2/5/2018, OTC      ciprofloxacin HCl (CIPRO) 250 MG tablet Take 1 tablet (250 mg total) by mouth every 12 (twelve) hours., Starting Mon 2/5/2018, No Print         CONTINUE these medications which have NOT CHANGED    Details   atorvastatin (LIPITOR) 10 MG tablet Take 10 mg by mouth once daily., Historical Med      metoprolol  succinate (TOPROL-XL) 50 MG 24 hr tablet Take 50 mg by mouth once daily., Historical Med         STOP taking these medications       traMADol (ULTRAM) 50 mg tablet Comments:   Reason for Stopping:               Indwelling Lines/Drains at time of discharge:   Lines/Drains/Airways     Drain                 Urethral Catheter 01/31/18 1634 Double-lumen 16 Fr. 5 days                Time spent on the discharge of patient: 45 minutes  Patient was seen and examined on the date of discharge and determined to be suitable for discharge.         Maria E Jaimes MD  Department of Hospital Medicine  [facility]

## 2018-02-06 NOTE — NURSING
Transportation gris here to pick patient up by stretcher with oxygen as well . Robles to gravity patent . No acute events or change in status. All paperwork provided to [facility] staff .

## 2018-02-20 ENCOUNTER — OFFICE VISIT (OUTPATIENT)
Dept: UROLOGY | Facility: CLINIC | Age: 83
End: 2018-02-20
Payer: MEDICARE

## 2018-02-20 VITALS — RESPIRATION RATE: 14 BRPM | BODY MASS INDEX: 19.77 KG/M2 | HEIGHT: 66 IN | WEIGHT: 123 LBS

## 2018-02-20 DIAGNOSIS — N13.39 OTHER HYDRONEPHROSIS: ICD-10-CM

## 2018-02-20 DIAGNOSIS — R33.9 URINARY RETENTION: Primary | ICD-10-CM

## 2018-02-20 PROCEDURE — 51702 INSERT TEMP BLADDER CATH: CPT | Mod: PBBFAC | Performed by: NURSE PRACTITIONER

## 2018-02-20 PROCEDURE — 99214 OFFICE O/P EST MOD 30 MIN: CPT | Mod: S$PBB,25,, | Performed by: NURSE PRACTITIONER

## 2018-02-20 PROCEDURE — 51700 IRRIGATION OF BLADDER: CPT | Mod: S$PBB,,, | Performed by: NURSE PRACTITIONER

## 2018-02-20 PROCEDURE — 1159F MED LIST DOCD IN RCRD: CPT | Mod: ,,, | Performed by: NURSE PRACTITIONER

## 2018-02-20 PROCEDURE — 99999 PR PBB SHADOW E&M-EST. PATIENT-LVL III: CPT | Mod: PBBFAC,,, | Performed by: NURSE PRACTITIONER

## 2018-02-20 PROCEDURE — 1126F AMNT PAIN NOTED NONE PRSNT: CPT | Mod: ,,, | Performed by: NURSE PRACTITIONER

## 2018-02-20 PROCEDURE — 51700 IRRIGATION OF BLADDER: CPT | Mod: PBBFAC | Performed by: NURSE PRACTITIONER

## 2018-02-20 PROCEDURE — 99213 OFFICE O/P EST LOW 20 MIN: CPT | Mod: PBBFAC,25 | Performed by: NURSE PRACTITIONER

## 2018-02-20 RX ORDER — TAMSULOSIN HYDROCHLORIDE 0.4 MG/1
0.4 CAPSULE ORAL DAILY
Qty: 30 CAPSULE | Refills: 11 | Status: ON HOLD | OUTPATIENT
Start: 2018-02-20 | End: 2018-03-15 | Stop reason: HOSPADM

## 2018-02-20 NOTE — PROGRESS NOTES
Subjective:       Patient ID: Alen Marin is a 88 y.o. male who is an established patient of Dr. Price though new to me was seen for evaluation of acute urinary retention    Chief Complaint:   No chief complaint on file.    Patient was sent to [facility] from [facility] d/t elevated creatinine (Cr 9.5). VINCE showed bilateral hydronephrosis and a distended bladder therefore Urology was consulted. Per notes patient was voiding at this time. Unsuccessful attempt at chester placement made by Dr. Price at the bedside.  Subsequently he was taken to OR for cystoscopy bilateral retrograde pyelogram and chester placement on 1/31/18. 1000 ml of purulent ronald urine were drained with chester placement. No urine cx available.  ARF with slow improvement throughout hospital course, Cr 2.6 on day of discharge.    He is here today for a VOT. He is accompanied by NH staff. Patient has advanced dementia and is a poor historian. He denies dysuria. NH staff denies any gross hematuria or complications with chester.  Staff reports patient voiding in diaper prior to hospital admission      ACTIVE MEDICAL ISSUES:  Patient Active Problem List   Diagnosis    Acute on chronic renal insufficiency    Anemia    Hyperkalemia    Metabolic acidosis    Hyperphosphatemia    Severe protein-calorie malnutrition    Dementia without behavioral disturbance    Benign essential hypertension    Hyperlipidemia    Other hydronephrosis    Urinary retention    Goals of care, counseling/discussion    Stage 3 chronic kidney disease       ALLERGIES AND MEDICATIONS: updated and reviewed.  Review of patient's allergies indicates:  No Known Allergies  Current Outpatient Prescriptions   Medication Sig    acetaminophen (TYLENOL) 325 MG tablet Take 2 tablets (650 mg total) by mouth every 6 (six) hours as needed for Pain or Temperature greater than (or equal to 101 degree F).    atorvastatin (LIPITOR) 10 MG tablet Take 10 mg by mouth once daily.    ciprofloxacin  "HCl (CIPRO) 250 MG tablet Take 1 tablet (250 mg total) by mouth every 12 (twelve) hours.    metoprolol succinate (TOPROL-XL) 50 MG 24 hr tablet Take 50 mg by mouth once daily.    tamsulosin (FLOMAX) 0.4 mg Cp24 Take 1 capsule (0.4 mg total) by mouth once daily.     No current facility-administered medications for this visit.        Review of Systems   Constitutional: Negative for activity change, chills, fatigue, fever and unexpected weight change.   Eyes: Negative for discharge, redness and visual disturbance.   Respiratory: Negative for cough, shortness of breath and wheezing.    Cardiovascular: Negative for chest pain and leg swelling.   Gastrointestinal: Negative for abdominal distention, abdominal pain, constipation, diarrhea, nausea and vomiting.   Genitourinary: Positive for difficulty urinating. Negative for dysuria, flank pain, frequency, hematuria and urgency.   Musculoskeletal: Negative for arthralgias, joint swelling and myalgias.   Skin: Negative for color change and rash.   Neurological: Negative for dizziness and light-headedness.   Psychiatric/Behavioral: Negative for behavioral problems and confusion. The patient is not nervous/anxious.        Objective:      Vitals:    02/20/18 1010   Resp: 14   Weight: 55.8 kg (123 lb)   Height: 5' 6" (1.676 m)     Physical Exam   Constitutional: He is oriented to person, place, and time. He appears well-developed. No distress.   HENT:   Head: Normocephalic and atraumatic.   Nose: Nose normal.   Eyes: Conjunctivae are normal. Right eye exhibits no discharge. Left eye exhibits no discharge.   Neck: Normal range of motion. Neck supple. No tracheal deviation present. No thyromegaly present.   Cardiovascular: Normal rate and regular rhythm.    Pulmonary/Chest: Effort normal. No respiratory distress. He has no wheezes.   Abdominal: Soft. He exhibits no distension. There is no hepatosplenomegaly. There is no tenderness. There is no CVA tenderness. No hernia. "   Genitourinary:   Genitourinary Comments: Chester with pink tinged urine   Musculoskeletal:   Patient in wheelchair, non weight bearing   Neurological: He is alert and oriented to person, place, and time.   Skin: Skin is warm and dry. No rash noted. No erythema.     Psychiatric: He has a normal mood and affect. His behavior is normal. Judgment normal.       Urine dipstick shows not done--chester in place  Voiding Trial: 120 cc instilled, 0 cc voided    18 Fr Chester placed by nurse using sterile technique under my supervision. Clear yellow urine returned when chester placed. Patient tolerated procedure well    Assessment:       1. Urinary retention    2. Other hydronephrosis          Plan:       1. Urinary retention  - Voiding Trial Today: Unsuccessful  -Flomax--rx given to caretaker  -rtc in 1 week for repeat VOT    2. Other hydronephrosis  -Likely r/t UR  -Replace chester  -Consider repeat VINCE in future            Follow-up in about 1 week (around 2/27/2018).

## 2018-02-26 ENCOUNTER — OFFICE VISIT (OUTPATIENT)
Dept: UROLOGY | Facility: CLINIC | Age: 83
End: 2018-02-26
Payer: MEDICARE

## 2018-02-26 ENCOUNTER — TELEPHONE (OUTPATIENT)
Dept: UROLOGY | Facility: CLINIC | Age: 83
End: 2018-02-26

## 2018-02-26 VITALS — RESPIRATION RATE: 14 BRPM | HEIGHT: 66 IN

## 2018-02-26 DIAGNOSIS — N13.8 BPH WITH OBSTRUCTION/LOWER URINARY TRACT SYMPTOMS: ICD-10-CM

## 2018-02-26 DIAGNOSIS — R33.9 URINARY RETENTION: Primary | ICD-10-CM

## 2018-02-26 DIAGNOSIS — N40.1 BPH WITH OBSTRUCTION/LOWER URINARY TRACT SYMPTOMS: ICD-10-CM

## 2018-02-26 DIAGNOSIS — N13.39 OTHER HYDRONEPHROSIS: ICD-10-CM

## 2018-02-26 PROCEDURE — 51700 IRRIGATION OF BLADDER: CPT | Mod: PBBFAC | Performed by: NURSE PRACTITIONER

## 2018-02-26 PROCEDURE — 51700 IRRIGATION OF BLADDER: CPT | Mod: S$PBB,,, | Performed by: NURSE PRACTITIONER

## 2018-02-26 PROCEDURE — 1159F MED LIST DOCD IN RCRD: CPT | Mod: ,,, | Performed by: NURSE PRACTITIONER

## 2018-02-26 PROCEDURE — 1126F AMNT PAIN NOTED NONE PRSNT: CPT | Mod: ,,, | Performed by: NURSE PRACTITIONER

## 2018-02-26 PROCEDURE — 51702 INSERT TEMP BLADDER CATH: CPT | Mod: PBBFAC | Performed by: NURSE PRACTITIONER

## 2018-02-26 PROCEDURE — 99999 PR PBB SHADOW E&M-EST. PATIENT-LVL II: CPT | Mod: PBBFAC,,, | Performed by: NURSE PRACTITIONER

## 2018-02-26 PROCEDURE — 99214 OFFICE O/P EST MOD 30 MIN: CPT | Mod: S$PBB,25,, | Performed by: NURSE PRACTITIONER

## 2018-02-26 PROCEDURE — 99212 OFFICE O/P EST SF 10 MIN: CPT | Mod: PBBFAC | Performed by: NURSE PRACTITIONER

## 2018-02-26 NOTE — TELEPHONE ENCOUNTER
Spoke to patient Son about next apt in march and we wish that he would be present. Patient son states that he will be here with his dad to discuss POC. Apt reminder sent to home

## 2018-02-26 NOTE — PROGRESS NOTES
Subjective:       Patient ID: Alen Marin is a 88 y.o. male who was last seen in this office 2/20/2018    Chief Complaint:   No chief complaint on file.    Patient was sent to [facility] from [facility] d/t elevated creatinine (Cr 9.5). VINCE showed bilateral hydronephrosis and a distended bladder therefore Urology was consulted. Per notes patient was voiding at this time. Unsuccessful attempt at chester placement made by Dr. Price at the bedside.  Subsequently he was taken to OR for cystoscopy bilateral retrograde pyelogram and chester placement on 1/31/18. 1000 ml of purulent ronald urine were drained with chester placement. No urine cx available.  ARF with slow improvement throughout hospital course, Cr 2.6 on day of discharge.    He had an unsuccessful VOT in this office on 2/20/18. He is currently taking Flomax which was started during his initial visit with me. He is accompanied by NH staff. Patient has advanced dementia and is a poor historian .He returns today for a repeat VOT.  He denies dysuria. NH staff denies any gross hematuria or complications with chester.  Staff reports patient voiding in diaper prior to hospital admission.    ACTIVE MEDICAL ISSUES:  Patient Active Problem List   Diagnosis    Acute on chronic renal insufficiency    Anemia    Hyperkalemia    Metabolic acidosis    Hyperphosphatemia    Severe protein-calorie malnutrition    Dementia without behavioral disturbance    Benign essential hypertension    Hyperlipidemia    Other hydronephrosis    Urinary retention    Goals of care, counseling/discussion    Stage 3 chronic kidney disease       ALLERGIES AND MEDICATIONS: updated and reviewed.  Review of patient's allergies indicates:  No Known Allergies  Current Outpatient Prescriptions   Medication Sig    acetaminophen (TYLENOL) 325 MG tablet Take 2 tablets (650 mg total) by mouth every 6 (six) hours as needed for Pain or Temperature greater than (or equal to 101 degree F).    atorvastatin  "(LIPITOR) 10 MG tablet Take 10 mg by mouth once daily.    ciprofloxacin HCl (CIPRO) 250 MG tablet Take 1 tablet (250 mg total) by mouth every 12 (twelve) hours.    metoprolol succinate (TOPROL-XL) 50 MG 24 hr tablet Take 50 mg by mouth once daily.    tamsulosin (FLOMAX) 0.4 mg Cp24 Take 1 capsule (0.4 mg total) by mouth once daily.     No current facility-administered medications for this visit.        Review of Systems   Constitutional: Negative for activity change, chills, fatigue, fever and unexpected weight change.   Eyes: Negative for discharge, redness and visual disturbance.   Respiratory: Negative for cough, shortness of breath and wheezing.    Cardiovascular: Negative for chest pain and leg swelling.   Gastrointestinal: Negative for abdominal distention, abdominal pain, constipation, diarrhea, nausea and vomiting.   Genitourinary: Positive for difficulty urinating. Negative for decreased urine volume, dysuria, flank pain, frequency, hematuria and urgency.   Musculoskeletal: Negative for arthralgias, joint swelling and myalgias.   Skin: Negative for color change and rash.   Neurological: Negative for dizziness and light-headedness.   Psychiatric/Behavioral: Negative for behavioral problems and confusion. The patient is not nervous/anxious.        Objective:      Vitals:    02/26/18 1110   Resp: 14   Height: 5' 6" (1.676 m)     Physical Exam   Constitutional: He is oriented to person, place, and time. He appears well-developed.   HENT:   Head: Normocephalic and atraumatic.   Nose: Nose normal.   Eyes: Conjunctivae are normal. Right eye exhibits no discharge. Left eye exhibits no discharge.   Neck: Normal range of motion. Neck supple. No tracheal deviation present. No thyromegaly present.   Cardiovascular: Normal rate and regular rhythm.    Pulmonary/Chest: Effort normal. No respiratory distress. He has no wheezes.   Abdominal: Soft. He exhibits no distension. There is no hepatosplenomegaly. There is no " tenderness. There is no CVA tenderness. No hernia.   Genitourinary:   Genitourinary Comments: Chester draining ronald urine   Musculoskeletal: He exhibits no edema.   Patient in wheelchair, non weight bearing   Neurological: He is alert and oriented to person, place, and time.   Skin: Skin is warm and dry. No rash noted. No erythema.     Psychiatric: He has a normal mood and affect. His behavior is normal. Judgment normal.       Urine dipstick shows not done-chester in place  Voiding Trial: 180cc instilled, 0 cc voided. Patient without urge    18 Fr Chester placed by nurse using sterile technique under my supervision. Pink tinged urine returned when chester placed. Urine turned to clear with irrigation. Patient tolerated procedure well  Assessment:       1. Urinary retention    2. BPH with obstruction/lower urinary tract symptoms    3. Other hydronephrosis          Plan:       1. Urinary retention  - Voiding Trial 2/20/18: Unsuccessful  -Voiding Trial today: Unsuccessful  -Insert chester catheter  -Recent cystoscopy 1/31/18--essentially normal, hypertrophy of prostate (regrowth of median lobe)  -Cysto/UDS would be difficult to perform d/t patient having advanced dementia  -Continue Flomax    2. BPH with obstruction/lower urinary tract symptoms  -Recent Cystoscopy and bilateral RPG 1/31/18--hypertrophy of prostate (regrowth of median lobe); no bladder tumors or lesions  -s/p TURP in the past  -Consider re-TURP. -Will have patient f/u in 1 week with Dr. Price to discuss this and/or other alternatives. Discussed having son present during next office visit with NH staff will also notify son of POC     3. Other hydronephrosis  -Likely r/t UR  -Replace chester  -Consider repeat VINCE in future            Follow-up in about 1 week (around 3/5/2018) for Follow up.

## 2018-03-07 ENCOUNTER — OFFICE VISIT (OUTPATIENT)
Dept: UROLOGY | Facility: CLINIC | Age: 83
DRG: 698 | End: 2018-03-07
Payer: MEDICARE

## 2018-03-07 VITALS — WEIGHT: 123 LBS | HEIGHT: 66 IN | BODY MASS INDEX: 19.77 KG/M2

## 2018-03-07 DIAGNOSIS — R33.9 URINARY RETENTION: ICD-10-CM

## 2018-03-07 DIAGNOSIS — R35.1 NOCTURIA MORE THAN TWICE PER NIGHT: ICD-10-CM

## 2018-03-07 DIAGNOSIS — N40.1 BPH WITH OBSTRUCTION/LOWER URINARY TRACT SYMPTOMS: Primary | ICD-10-CM

## 2018-03-07 DIAGNOSIS — N13.8 BPH WITH OBSTRUCTION/LOWER URINARY TRACT SYMPTOMS: Primary | ICD-10-CM

## 2018-03-07 PROCEDURE — 99999 PR PBB SHADOW E&M-EST. PATIENT-LVL III: CPT | Mod: PBBFAC,,, | Performed by: UROLOGY

## 2018-03-07 PROCEDURE — 99213 OFFICE O/P EST LOW 20 MIN: CPT | Mod: PBBFAC | Performed by: UROLOGY

## 2018-03-07 PROCEDURE — 99214 OFFICE O/P EST MOD 30 MIN: CPT | Mod: S$PBB,,, | Performed by: UROLOGY

## 2018-03-07 RX ORDER — CIPROFLOXACIN 2 MG/ML
400 INJECTION, SOLUTION INTRAVENOUS
Status: CANCELLED | OUTPATIENT
Start: 2018-03-07

## 2018-03-07 RX ORDER — FUROSEMIDE 20 MG/1
20 TABLET ORAL 2 TIMES DAILY
Status: ON HOLD | COMMUNITY
End: 2018-03-15

## 2018-03-07 RX ORDER — POTASSIUM CHLORIDE 20 MEQ/15ML
SOLUTION ORAL DAILY
Status: ON HOLD | COMMUNITY
End: 2018-03-15 | Stop reason: HOSPADM

## 2018-03-07 NOTE — H&P
Subjective:       Patient ID: Alen Marin is a 88 y.o. male who was referred by No ref. provider found    Chief Complaint:   Chief Complaint   Patient presents with    Urinary Retention     possible vot an discuss procedure        Patient was sent to [facility] from [facility] d/t elevated creatinine (Cr 9.5). VINCE showed bilateral hydronephrosis and a distended bladder therefore Urology was consulted. Per notes patient was voiding at this time. Unsuccessful attempt at chester placement made by Dr. Price at the bedside.  Subsequently he was taken to OR for cystoscopy bilateral retrograde pyelogram and chester placement on 1/31/18. 1000 ml of purulent ronald urine were drained with chester placement. No urine cx available.  ARF with slow improvement throughout hospital course, Cr 2.6 on day of discharge.     He had an unsuccessful VOT in this office on 2/20/18. He is currently taking Flomax which was started during his initial visit with me. He is accompanied by NH staff. Patient has advanced dementia and is a poor historian.  He had another unsuccessful voiding trial on 2/26/2018.    He denies dysuria. NH staff denies any gross hematuria or complications with chester.  Staff reports patient voiding in diaper prior to hospital admission.    ACTIVE MEDICAL ISSUES:  Patient Active Problem List   Diagnosis    Acute on chronic renal insufficiency    Anemia    Hyperkalemia    Metabolic acidosis    Hyperphosphatemia    Severe protein-calorie malnutrition    Dementia without behavioral disturbance    Benign essential hypertension    Hyperlipidemia    Other hydronephrosis    Urinary retention    Goals of care, counseling/discussion    Stage 3 chronic kidney disease       PAST MEDICAL HISTORY  Past Medical History:   Diagnosis Date    Alzheimer disease     Arthritis     High cholesterol     Hypertension        PAST SURGICAL HISTORY:  Past Surgical History:   Procedure Laterality Date    BACK SURGERY         SOCIAL  HISTORY:  Social History   Substance Use Topics    Smoking status: Former Smoker    Smokeless tobacco: Never Used    Alcohol use No       FAMILY HISTORY:  History reviewed. No pertinent family history.    ALLERGIES AND MEDICATIONS: updated and reviewed.  Review of patient's allergies indicates:  No Known Allergies  Current Outpatient Prescriptions   Medication Sig    acetaminophen (TYLENOL) 325 MG tablet Take 2 tablets (650 mg total) by mouth every 6 (six) hours as needed for Pain or Temperature greater than (or equal to 101 degree F).    atorvastatin (LIPITOR) 10 MG tablet Take 10 mg by mouth once daily.    ciprofloxacin HCl (CIPRO) 250 MG tablet Take 1 tablet (250 mg total) by mouth every 12 (twelve) hours.    furosemide (LASIX) 20 MG tablet Take 20 mg by mouth 2 (two) times daily.    metoprolol succinate (TOPROL-XL) 50 MG 24 hr tablet Take 50 mg by mouth once daily.    potassium chloride 10% (KAYCIEL) 20 mEq/15 mL solution Take by mouth once daily.    tamsulosin (FLOMAX) 0.4 mg Cp24 Take 1 capsule (0.4 mg total) by mouth once daily.     No current facility-administered medications for this visit.        Review of Systems   Constitutional: Negative for activity change, fatigue, fever and unexpected weight change.   HENT: Negative for congestion.    Eyes: Negative for redness.   Respiratory: Negative for chest tightness and shortness of breath.    Cardiovascular: Negative for chest pain and leg swelling.   Gastrointestinal: Negative for abdominal pain, constipation, diarrhea, nausea and vomiting.   Genitourinary: Negative for dysuria, flank pain, frequency, hematuria, penile pain, penile swelling, scrotal swelling, testicular pain and urgency.   Musculoskeletal: Negative for arthralgias and back pain.   Neurological: Negative for dizziness and light-headedness.   Psychiatric/Behavioral: Negative for behavioral problems and confusion. The patient is not nervous/anxious.    All other systems reviewed and  "are negative.      Objective:      Vitals:    03/07/18 1317   Weight: 55.8 kg (123 lb)   Height: 5' 6" (1.676 m)     Physical Exam   Nursing note and vitals reviewed.  Constitutional: He is oriented to person, place, and time. He appears well-developed and well-nourished.   HENT:   Head: Normocephalic.   Eyes: Conjunctivae are normal.   Neck: Normal range of motion. Neck supple. No tracheal deviation present. No thyromegaly present.   Cardiovascular: Normal rate and normal heart sounds.    Pulmonary/Chest: Effort normal and breath sounds normal. No respiratory distress. He has no wheezes.   Abdominal: Soft. Bowel sounds are normal. There is no hepatosplenomegaly. There is no tenderness. There is no rebound and no CVA tenderness. No hernia.   Genitourinary:   Genitourinary Comments: Robles in place, yellow urine   Musculoskeletal: Normal range of motion. He exhibits no edema or tenderness.   Lymphadenopathy:     He has no cervical adenopathy.   Neurological: He is alert and oriented to person, place, and time.   Skin: Skin is warm and dry. No rash noted. No erythema.     Psychiatric: He has a normal mood and affect. His behavior is normal. Judgment and thought content normal.           Assessment:       1. BPH with obstruction/lower urinary tract symptoms    2. Urinary retention    3. Nocturia more than twice per night          Plan:       1. BPH with obstruction/lower urinary tract symptoms  Plan for a TURP on Wednesday 3/21/2018  Stay on Flomax    2. Urinary retention  Robles    3. Nocturia more than twice per night  Limit evening fluids            Follow-up in about 3 weeks (around 3/28/2018) for Follow up.  "

## 2018-03-07 NOTE — PROGRESS NOTES
Subjective:       Patient ID: Alen Marin is a 88 y.o. male who was referred by No ref. provider found    Chief Complaint:   Chief Complaint   Patient presents with    Urinary Retention     possible vot an discuss procedure        Patient was sent to [facility] from [facility] d/t elevated creatinine (Cr 9.5). VINCE showed bilateral hydronephrosis and a distended bladder therefore Urology was consulted. Per notes patient was voiding at this time. Unsuccessful attempt at chester placement made by Dr. Price at the bedside.  Subsequently he was taken to OR for cystoscopy bilateral retrograde pyelogram and chester placement on 1/31/18. 1000 ml of purulent ronald urine were drained with chester placement. No urine cx available.  ARF with slow improvement throughout hospital course, Cr 2.6 on day of discharge.     He had an unsuccessful VOT in this office on 2/20/18. He is currently taking Flomax which was started during his initial visit with me. He is accompanied by NH staff. Patient has advanced dementia and is a poor historian.  He had another unsuccessful voiding trial on 2/26/2018.    He denies dysuria. NH staff denies any gross hematuria or complications with chester.  Staff reports patient voiding in diaper prior to hospital admission.    ACTIVE MEDICAL ISSUES:  Patient Active Problem List   Diagnosis    Acute on chronic renal insufficiency    Anemia    Hyperkalemia    Metabolic acidosis    Hyperphosphatemia    Severe protein-calorie malnutrition    Dementia without behavioral disturbance    Benign essential hypertension    Hyperlipidemia    Other hydronephrosis    Urinary retention    Goals of care, counseling/discussion    Stage 3 chronic kidney disease       PAST MEDICAL HISTORY  Past Medical History:   Diagnosis Date    Alzheimer disease     Arthritis     High cholesterol     Hypertension        PAST SURGICAL HISTORY:  Past Surgical History:   Procedure Laterality Date    BACK SURGERY         SOCIAL  HISTORY:  Social History   Substance Use Topics    Smoking status: Former Smoker    Smokeless tobacco: Never Used    Alcohol use No       FAMILY HISTORY:  History reviewed. No pertinent family history.    ALLERGIES AND MEDICATIONS: updated and reviewed.  Review of patient's allergies indicates:  No Known Allergies  Current Outpatient Prescriptions   Medication Sig    acetaminophen (TYLENOL) 325 MG tablet Take 2 tablets (650 mg total) by mouth every 6 (six) hours as needed for Pain or Temperature greater than (or equal to 101 degree F).    atorvastatin (LIPITOR) 10 MG tablet Take 10 mg by mouth once daily.    ciprofloxacin HCl (CIPRO) 250 MG tablet Take 1 tablet (250 mg total) by mouth every 12 (twelve) hours.    furosemide (LASIX) 20 MG tablet Take 20 mg by mouth 2 (two) times daily.    metoprolol succinate (TOPROL-XL) 50 MG 24 hr tablet Take 50 mg by mouth once daily.    potassium chloride 10% (KAYCIEL) 20 mEq/15 mL solution Take by mouth once daily.    tamsulosin (FLOMAX) 0.4 mg Cp24 Take 1 capsule (0.4 mg total) by mouth once daily.     No current facility-administered medications for this visit.        Review of Systems   Constitutional: Negative for activity change, fatigue, fever and unexpected weight change.   HENT: Negative for congestion.    Eyes: Negative for redness.   Respiratory: Negative for chest tightness and shortness of breath.    Cardiovascular: Negative for chest pain and leg swelling.   Gastrointestinal: Negative for abdominal pain, constipation, diarrhea, nausea and vomiting.   Genitourinary: Negative for dysuria, flank pain, frequency, hematuria, penile pain, penile swelling, scrotal swelling, testicular pain and urgency.   Musculoskeletal: Negative for arthralgias and back pain.   Neurological: Negative for dizziness and light-headedness.   Psychiatric/Behavioral: Negative for behavioral problems and confusion. The patient is not nervous/anxious.    All other systems reviewed and  "are negative.      Objective:      Vitals:    03/07/18 1317   Weight: 55.8 kg (123 lb)   Height: 5' 6" (1.676 m)     Physical Exam   Nursing note and vitals reviewed.  Constitutional: He is oriented to person, place, and time. He appears well-developed and well-nourished.   HENT:   Head: Normocephalic.   Eyes: Conjunctivae are normal.   Neck: Normal range of motion. Neck supple. No tracheal deviation present. No thyromegaly present.   Cardiovascular: Normal rate and normal heart sounds.    Pulmonary/Chest: Effort normal and breath sounds normal. No respiratory distress. He has no wheezes.   Abdominal: Soft. Bowel sounds are normal. There is no hepatosplenomegaly. There is no tenderness. There is no rebound and no CVA tenderness. No hernia.   Genitourinary:   Genitourinary Comments: Robles in place, yellow urine   Musculoskeletal: Normal range of motion. He exhibits no edema or tenderness.   Lymphadenopathy:     He has no cervical adenopathy.   Neurological: He is alert and oriented to person, place, and time.   Skin: Skin is warm and dry. No rash noted. No erythema.     Psychiatric: He has a normal mood and affect. His behavior is normal. Judgment and thought content normal.           Assessment:       1. BPH with obstruction/lower urinary tract symptoms    2. Urinary retention    3. Nocturia more than twice per night          Plan:       1. BPH with obstruction/lower urinary tract symptoms  Plan for a TURP on Wednesday 3/21/2018  Stay on Flomax    2. Urinary retention  Robles    3. Nocturia more than twice per night  Limit evening fluids            Follow-up in about 3 weeks (around 3/28/2018) for Follow up.  "

## 2018-03-10 ENCOUNTER — HOSPITAL ENCOUNTER (INPATIENT)
Facility: HOSPITAL | Age: 83
LOS: 5 days | Discharge: HOSPICE | DRG: 698 | End: 2018-03-15
Attending: EMERGENCY MEDICINE | Admitting: HOSPITALIST
Payer: MEDICARE

## 2018-03-10 DIAGNOSIS — N13.8 BPH WITH URINARY OBSTRUCTION: ICD-10-CM

## 2018-03-10 DIAGNOSIS — R52 PAIN: ICD-10-CM

## 2018-03-10 DIAGNOSIS — N40.1 BPH WITH URINARY OBSTRUCTION: ICD-10-CM

## 2018-03-10 DIAGNOSIS — S42.202A CLOSED FRACTURE OF PROXIMAL END OF LEFT HUMERUS, UNSPECIFIED FRACTURE MORPHOLOGY, INITIAL ENCOUNTER: ICD-10-CM

## 2018-03-10 DIAGNOSIS — A41.51 SEPSIS DUE TO ESCHERICHIA COLI: ICD-10-CM
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
## 2018-03-10 DIAGNOSIS — I38 ENDOCARDITIS: ICD-10-CM

## 2018-03-10 DIAGNOSIS — D64.9 ANEMIA, UNSPECIFIED TYPE: ICD-10-CM

## 2018-03-10 DIAGNOSIS — N18.9 CHRONIC KIDNEY DISEASE, UNSPECIFIED CKD STAGE: Primary | ICD-10-CM

## 2018-03-10 DIAGNOSIS — R33.9 URINARY RETENTION: ICD-10-CM

## 2018-03-10 PROBLEM — S42.309A HUMERUS FRACTURE: Status: ACTIVE | Noted: 2018-03-10

## 2018-03-10 PROBLEM — T83.511A URINARY TRACT INFECTION ASSOCIATED WITH INDWELLING URETHRAL CATHETER: Status: ACTIVE | Noted: 2018-03-10

## 2018-03-10 PROBLEM — Z87.828 H/O TRAUMATIC SUBDURAL HEMATOMA: Status: ACTIVE | Noted: 2018-03-10

## 2018-03-10 PROBLEM — A41.9 SEPSIS: Status: ACTIVE | Noted: 2018-03-10

## 2018-03-10 PROBLEM — N39.0 URINARY TRACT INFECTION ASSOCIATED WITH INDWELLING URETHRAL CATHETER: Status: ACTIVE | Noted: 2018-03-10

## 2018-03-10 PROBLEM — D63.8 ANEMIA OF CHRONIC DISEASE: Status: ACTIVE | Noted: 2018-01-30

## 2018-03-10 LAB
ABO + RH BLD: NORMAL
ALBUMIN SERPL BCP-MCNC: 1.4 G/DL
ALP SERPL-CCNC: 197 U/L
ALT SERPL W/O P-5'-P-CCNC: 21 U/L
ANION GAP SERPL CALC-SCNC: 10 MMOL/L
APTT BLDCRRT: 31.8 SEC
AST SERPL-CCNC: 38 U/L
BACTERIA #/AREA URNS HPF: ABNORMAL /HPF
BASOPHILS # BLD AUTO: 0 K/UL
BASOPHILS NFR BLD: 0 %
BILIRUB SERPL-MCNC: 0.7 MG/DL
BILIRUB UR QL STRIP: NEGATIVE
BLD GP AB SCN CELLS X3 SERPL QL: NORMAL
BLD PROD TYP BPU: NORMAL
BLD PROD TYP BPU: NORMAL
BLOOD UNIT EXPIRATION DATE: NORMAL
BLOOD UNIT EXPIRATION DATE: NORMAL
BLOOD UNIT TYPE CODE: 600
BLOOD UNIT TYPE CODE: 600
BLOOD UNIT TYPE: NORMAL
BLOOD UNIT TYPE: NORMAL
BNP SERPL-MCNC: 281 PG/ML
BUN SERPL-MCNC: 47 MG/DL
CALCIUM SERPL-MCNC: 8.1 MG/DL
CHLORIDE SERPL-SCNC: 113 MMOL/L
CK MB SERPL-MCNC: 25.4 NG/ML
CK MB SERPL-RTO: 6.8 %
CK SERPL-CCNC: 373 U/L
CK SERPL-CCNC: 373 U/L
CLARITY UR: ABNORMAL
CO2 SERPL-SCNC: 19 MMOL/L
CODING SYSTEM: NORMAL
CODING SYSTEM: NORMAL
COLOR UR: YELLOW
CREAT SERPL-MCNC: 1.8 MG/DL
DIFFERENTIAL METHOD: ABNORMAL
DISPENSE STATUS: NORMAL
DISPENSE STATUS: NORMAL
EOSINOPHIL # BLD AUTO: 0 K/UL
EOSINOPHIL NFR BLD: 0.4 %
ERYTHROCYTE [DISTWIDTH] IN BLOOD BY AUTOMATED COUNT: 20.5 %
EST. GFR  (AFRICAN AMERICAN): 38 ML/MIN/1.73 M^2
EST. GFR  (NON AFRICAN AMERICAN): 33 ML/MIN/1.73 M^2
GLUCOSE SERPL-MCNC: 94 MG/DL
GLUCOSE UR QL STRIP: NEGATIVE
HCT VFR BLD AUTO: 21.7 %
HGB BLD-MCNC: 7 G/DL
HGB UR QL STRIP: ABNORMAL
HYALINE CASTS #/AREA URNS LPF: ABNORMAL /LPF
INR PPP: 1.2
KETONES UR QL STRIP: NEGATIVE
LACTATE SERPL-SCNC: 2.6 MMOL/L
LACTATE SERPL-SCNC: 2.7 MMOL/L
LEUKOCYTE ESTERASE UR QL STRIP: ABNORMAL
LYMPHOCYTES # BLD AUTO: 0.8 K/UL
LYMPHOCYTES NFR BLD: 9.7 %
MAGNESIUM SERPL-MCNC: 1.3 MG/DL
MCH RBC QN AUTO: 26.6 PG
MCHC RBC AUTO-ENTMCNC: 32.3 G/DL
MCV RBC AUTO: 83 FL
MICROSCOPIC COMMENT: ABNORMAL
MONOCYTES # BLD AUTO: 0.4 K/UL
MONOCYTES NFR BLD: 4.8 %
NEUTROPHILS # BLD AUTO: 6.7 K/UL
NEUTROPHILS NFR BLD: 85.1 %
NITRITE UR QL STRIP: NEGATIVE
OB PNL STL: NEGATIVE
PH UR STRIP: 5 [PH] (ref 5–8)
PLATELET # BLD AUTO: 177 K/UL
PMV BLD AUTO: 10.8 FL
POTASSIUM SERPL-SCNC: 4.3 MMOL/L
PROT SERPL-MCNC: 5.4 G/DL
PROT UR QL STRIP: ABNORMAL
PROTHROMBIN TIME: 12.9 SEC
RBC # BLD AUTO: 2.63 M/UL
RBC #/AREA URNS HPF: >100 /HPF (ref 0–4)
SODIUM SERPL-SCNC: 142 MMOL/L
SP GR UR STRIP: 1.01 (ref 1–1.03)
SQUAMOUS #/AREA URNS HPF: ABNORMAL /HPF
T4 FREE SERPL-MCNC: <0.4 NG/DL
TRANS ERYTHROCYTES VOL PATIENT: NORMAL ML
TRANS ERYTHROCYTES VOL PATIENT: NORMAL ML
TROPONIN I SERPL DL<=0.01 NG/ML-MCNC: 0.04 NG/ML
TROPONIN I SERPL DL<=0.01 NG/ML-MCNC: 0.04 NG/ML
TSH SERPL DL<=0.005 MIU/L-ACNC: 36.68 UIU/ML
URN SPEC COLLECT METH UR: ABNORMAL
UROBILINOGEN UR STRIP-ACNC: NEGATIVE EU/DL
WBC # BLD AUTO: 7.86 K/UL
WBC #/AREA URNS HPF: >100 /HPF (ref 0–5)

## 2018-03-10 PROCEDURE — 83735 ASSAY OF MAGNESIUM: CPT

## 2018-03-10 PROCEDURE — 87077 CULTURE AEROBIC IDENTIFY: CPT | Mod: 59

## 2018-03-10 PROCEDURE — 63600175 PHARM REV CODE 636 W HCPCS: Performed by: EMERGENCY MEDICINE

## 2018-03-10 PROCEDURE — 84484 ASSAY OF TROPONIN QUANT: CPT | Mod: 91

## 2018-03-10 PROCEDURE — 85025 COMPLETE CBC W/AUTO DIFF WBC: CPT

## 2018-03-10 PROCEDURE — 36415 COLL VENOUS BLD VENIPUNCTURE: CPT

## 2018-03-10 PROCEDURE — 93005 ELECTROCARDIOGRAM TRACING: CPT

## 2018-03-10 PROCEDURE — 81000 URINALYSIS NONAUTO W/SCOPE: CPT

## 2018-03-10 PROCEDURE — 63600175 PHARM REV CODE 636 W HCPCS: Performed by: HOSPITALIST

## 2018-03-10 PROCEDURE — 82533 TOTAL CORTISOL: CPT

## 2018-03-10 PROCEDURE — 87040 BLOOD CULTURE FOR BACTERIA: CPT

## 2018-03-10 PROCEDURE — 87086 URINE CULTURE/COLONY COUNT: CPT

## 2018-03-10 PROCEDURE — C9113 INJ PANTOPRAZOLE SODIUM, VIA: HCPCS | Performed by: EMERGENCY MEDICINE

## 2018-03-10 PROCEDURE — 20000000 HC ICU ROOM

## 2018-03-10 PROCEDURE — 83605 ASSAY OF LACTIC ACID: CPT

## 2018-03-10 PROCEDURE — 82272 OCCULT BLD FECES 1-3 TESTS: CPT

## 2018-03-10 PROCEDURE — 82553 CREATINE MB FRACTION: CPT

## 2018-03-10 PROCEDURE — 83880 ASSAY OF NATRIURETIC PEPTIDE: CPT

## 2018-03-10 PROCEDURE — 84443 ASSAY THYROID STIM HORMONE: CPT

## 2018-03-10 PROCEDURE — 96375 TX/PRO/DX INJ NEW DRUG ADDON: CPT

## 2018-03-10 PROCEDURE — 84439 ASSAY OF FREE THYROXINE: CPT

## 2018-03-10 PROCEDURE — 25000003 PHARM REV CODE 250: Performed by: HOSPITALIST

## 2018-03-10 PROCEDURE — 87186 SC STD MICRODIL/AGAR DIL: CPT | Mod: 59

## 2018-03-10 PROCEDURE — 85730 THROMBOPLASTIN TIME PARTIAL: CPT

## 2018-03-10 PROCEDURE — 94761 N-INVAS EAR/PLS OXIMETRY MLT: CPT

## 2018-03-10 PROCEDURE — 25000003 PHARM REV CODE 250: Performed by: EMERGENCY MEDICINE

## 2018-03-10 PROCEDURE — P9021 RED BLOOD CELLS UNIT: HCPCS

## 2018-03-10 PROCEDURE — 99285 EMERGENCY DEPT VISIT HI MDM: CPT | Mod: 25

## 2018-03-10 PROCEDURE — 87086 URINE CULTURE/COLONY COUNT: CPT | Mod: 59

## 2018-03-10 PROCEDURE — 36430 TRANSFUSION BLD/BLD COMPNT: CPT

## 2018-03-10 PROCEDURE — 86920 COMPATIBILITY TEST SPIN: CPT

## 2018-03-10 PROCEDURE — 86901 BLOOD TYPING SEROLOGIC RH(D): CPT

## 2018-03-10 PROCEDURE — 85610 PROTHROMBIN TIME: CPT

## 2018-03-10 PROCEDURE — 80053 COMPREHEN METABOLIC PANEL: CPT

## 2018-03-10 PROCEDURE — 96365 THER/PROPH/DIAG IV INF INIT: CPT

## 2018-03-10 PROCEDURE — 51702 INSERT TEMP BLADDER CATH: CPT

## 2018-03-10 PROCEDURE — 83605 ASSAY OF LACTIC ACID: CPT | Mod: 91

## 2018-03-10 PROCEDURE — 87088 URINE BACTERIA CULTURE: CPT | Mod: 59

## 2018-03-10 PROCEDURE — 93010 ELECTROCARDIOGRAM REPORT: CPT | Mod: ,,, | Performed by: INTERNAL MEDICINE

## 2018-03-10 PROCEDURE — 84484 ASSAY OF TROPONIN QUANT: CPT

## 2018-03-10 PROCEDURE — P9047 ALBUMIN (HUMAN), 25%, 50ML: HCPCS | Mod: JG | Performed by: EMERGENCY MEDICINE

## 2018-03-10 PROCEDURE — 96361 HYDRATE IV INFUSION ADD-ON: CPT

## 2018-03-10 RX ORDER — ALBUMIN HUMAN 250 G/1000ML
25 SOLUTION INTRAVENOUS ONCE
Status: COMPLETED | OUTPATIENT
Start: 2018-03-10 | End: 2018-03-10

## 2018-03-10 RX ORDER — TAMSULOSIN HYDROCHLORIDE 0.4 MG/1
0.4 CAPSULE ORAL DAILY
Status: DISCONTINUED | OUTPATIENT
Start: 2018-03-10 | End: 2018-03-13

## 2018-03-10 RX ORDER — VANCOMYCIN HCL IN 5 % DEXTROSE 1G/250ML
15 PLASTIC BAG, INJECTION (ML) INTRAVENOUS DAILY PRN
Status: DISCONTINUED | OUTPATIENT
Start: 2018-03-11 | End: 2018-03-15

## 2018-03-10 RX ORDER — CEFTRIAXONE 1 G/1
1 INJECTION, POWDER, FOR SOLUTION INTRAMUSCULAR; INTRAVENOUS
Status: COMPLETED | OUTPATIENT
Start: 2018-03-10 | End: 2018-03-10

## 2018-03-10 RX ORDER — SODIUM CHLORIDE AND POTASSIUM CHLORIDE 150; 900 MG/100ML; MG/100ML
INJECTION, SOLUTION INTRAVENOUS CONTINUOUS
Status: DISCONTINUED | OUTPATIENT
Start: 2018-03-10 | End: 2018-03-10

## 2018-03-10 RX ORDER — ATORVASTATIN CALCIUM 10 MG/1
10 TABLET, FILM COATED ORAL DAILY
Status: DISCONTINUED | OUTPATIENT
Start: 2018-03-10 | End: 2018-03-13

## 2018-03-10 RX ORDER — VANCOMYCIN HCL IN 5 % DEXTROSE 1G/250ML
15 PLASTIC BAG, INJECTION (ML) INTRAVENOUS
Status: DISCONTINUED | OUTPATIENT
Start: 2018-03-10 | End: 2018-03-10 | Stop reason: SDUPTHER

## 2018-03-10 RX ORDER — SODIUM CHLORIDE 450 MG/100ML
INJECTION, SOLUTION INTRAVENOUS CONTINUOUS
Status: DISCONTINUED | OUTPATIENT
Start: 2018-03-10 | End: 2018-03-13

## 2018-03-10 RX ORDER — VANCOMYCIN HCL IN 5 % DEXTROSE 1G/250ML
15 PLASTIC BAG, INJECTION (ML) INTRAVENOUS
Status: DISCONTINUED | OUTPATIENT
Start: 2018-03-10 | End: 2018-03-10

## 2018-03-10 RX ORDER — SODIUM CHLORIDE 0.9 % (FLUSH) 0.9 %
3 SYRINGE (ML) INJECTION
Status: DISCONTINUED | OUTPATIENT
Start: 2018-03-10 | End: 2018-03-15 | Stop reason: HOSPADM

## 2018-03-10 RX ORDER — HYDROCODONE BITARTRATE AND ACETAMINOPHEN 500; 5 MG/1; MG/1
TABLET ORAL
Status: DISCONTINUED | OUTPATIENT
Start: 2018-03-10 | End: 2018-03-15

## 2018-03-10 RX ORDER — DICLOFENAC SODIUM 10 MG/G
2 GEL TOPICAL DAILY
Status: ON HOLD | COMMUNITY
End: 2018-03-15 | Stop reason: HOSPADM

## 2018-03-10 RX ORDER — MAGNESIUM SULFATE 1 G/100ML
1 INJECTION INTRAVENOUS
Status: COMPLETED | OUTPATIENT
Start: 2018-03-10 | End: 2018-03-10

## 2018-03-10 RX ADMIN — PIPERACILLIN AND TAZOBACTAM 4.5 G: 4; .5 INJECTION, POWDER, LYOPHILIZED, FOR SOLUTION INTRAVENOUS; PARENTERAL at 09:03

## 2018-03-10 RX ADMIN — PIPERACILLIN AND TAZOBACTAM 4.5 G: 4; .5 INJECTION, POWDER, LYOPHILIZED, FOR SOLUTION INTRAVENOUS; PARENTERAL at 10:03

## 2018-03-10 RX ADMIN — SODIUM CHLORIDE 500 ML: 0.9 INJECTION, SOLUTION INTRAVENOUS at 09:03

## 2018-03-10 RX ADMIN — SODIUM CHLORIDE: 0.45 INJECTION, SOLUTION INTRAVENOUS at 02:03

## 2018-03-10 RX ADMIN — VANCOMYCIN HYDROCHLORIDE 1000 MG: 1 INJECTION, POWDER, LYOPHILIZED, FOR SOLUTION INTRAVENOUS at 11:03

## 2018-03-10 RX ADMIN — TAMSULOSIN HYDROCHLORIDE 0.4 MG: 0.4 CAPSULE ORAL at 02:03

## 2018-03-10 RX ADMIN — ALBUMIN (HUMAN) 25 G: 25 SOLUTION INTRAVENOUS at 08:03

## 2018-03-10 RX ADMIN — SODIUM CHLORIDE 1000 ML: 0.9 INJECTION, SOLUTION INTRAVENOUS at 10:03

## 2018-03-10 RX ADMIN — MAGNESIUM SULFATE 1 G: 1 INJECTION INTRAVENOUS at 09:03

## 2018-03-10 RX ADMIN — ATORVASTATIN CALCIUM 10 MG: 10 TABLET, FILM COATED ORAL at 02:03

## 2018-03-10 RX ADMIN — SODIUM CHLORIDE AND POTASSIUM CHLORIDE: 9; 1.49 INJECTION, SOLUTION INTRAVENOUS at 11:03

## 2018-03-10 RX ADMIN — DEXTROSE 40 MG: 50 INJECTION, SOLUTION INTRAVENOUS at 11:03

## 2018-03-10 RX ADMIN — CEFTRIAXONE SODIUM 1 G: 1 INJECTION, POWDER, FOR SOLUTION INTRAMUSCULAR; INTRAVENOUS at 05:03

## 2018-03-10 NOTE — ASSESSMENT & PLAN NOTE
Contributed to patient's progressive decline  No acute issues and head CT with chronic changes only

## 2018-03-10 NOTE — ASSESSMENT & PLAN NOTE
Continue chester catheter  Failed voiding trial in the past  Will consult Urology after stabilization for catheter exchange

## 2018-03-10 NOTE — ASSESSMENT & PLAN NOTE
Advanced and progressive  Extensive discussion done with son on nature of disease course  Pt was in the process of going into hospice  Agree with hospice being best treatment in this patient  Son considering treatment options

## 2018-03-10 NOTE — ASSESSMENT & PLAN NOTE
Due to advanced dementia with failure to thrive  Expected not to improve given poor PO intake   Feeding tube is not indicated in this patient

## 2018-03-10 NOTE — ED TRIAGE NOTES
Pt presents to ED via EMS from [facility] with c/o unwitnessed fall. Pt was found lying on floor on right side by staff. Pt is aao x1 which is his baseline. Pt has no complaints, but his left shoulder appears out of socket. Nursing home staff did report that the pt was c/o left arm pain.

## 2018-03-10 NOTE — PLAN OF CARE
Problem: Patient Care Overview  Goal: Plan of Care Review  Outcome: Ongoing (interventions implemented as appropriate)  Admitted from ED this am.  Hypothermic, Van warming blanket placed.  Temp improved this pm.  Multiple antibiotics.  0.45 NS at 100cc/hr.  Receiving 2 units PRBC's.  Pt stays in fetal position.  Buttucks excoriated/reddened. Not following simple commands, garbled words at times, unable to comprehend.  Pressure ulcer & fall prevention interventions on-going.

## 2018-03-10 NOTE — ED NOTES
Pt linen changed. Pt cleaned. Sling applied by MERVAT COLE at bedside. Rectal temp obtained. MD notified.

## 2018-03-10 NOTE — ASSESSMENT & PLAN NOTE
No bleeding reported  H/H drop reflective of malnutrition and CKD  Transfusion already ordered   Will follow levels

## 2018-03-10 NOTE — ASSESSMENT & PLAN NOTE
Pt met criteria for sepsis with hypothermia, hypotension, lactic acidosis with complicated UTI as source  S/p IVF challenge with appropriate response in blood pressure with no need for pressors  Continued broad spectrum antibiotics of Zosyn and Vancomycin  F/u on blood and urine cultures

## 2018-03-10 NOTE — ASSESSMENT & PLAN NOTE
Chronic changes and with noted fracture on left side  Immobilized in shoulder sling  Will consult Ortho after patient is stable for recommendations

## 2018-03-10 NOTE — ED NOTES
Sling and Swathe incorrectly placed. Pt naked with only a diaper. Pt placed in gown. Pt is severed contracted. Sling and swathe correctly placed to left arm.

## 2018-03-10 NOTE — SUBJECTIVE & OBJECTIVE
Past Medical History:   Diagnosis Date    Alzheimer disease     Anemia of chronic disease     Arthritis     BPH with obstruction/lower urinary tract symptoms     Dementia     Failure to thrive in adult     High cholesterol     Hypertension     Renal disorder     Severe malnutrition     Subdural hematoma     Urinary retention due to benign prostatic hyperplasia        Past Surgical History:   Procedure Laterality Date    BACK SURGERY         Review of patient's allergies indicates:  No Known Allergies    No current facility-administered medications on file prior to encounter.      Current Outpatient Prescriptions on File Prior to Encounter   Medication Sig    atorvastatin (LIPITOR) 10 MG tablet Take 10 mg by mouth once daily.    furosemide (LASIX) 20 MG tablet Take 20 mg by mouth 2 (two) times daily.    metoprolol succinate (TOPROL-XL) 50 MG 24 hr tablet Take 50 mg by mouth once daily.    tamsulosin (FLOMAX) 0.4 mg Cp24 Take 1 capsule (0.4 mg total) by mouth once daily.    acetaminophen (TYLENOL) 325 MG tablet Take 2 tablets (650 mg total) by mouth every 6 (six) hours as needed for Pain or Temperature greater than (or equal to 101 degree F).    ciprofloxacin HCl (CIPRO) 250 MG tablet Take 1 tablet (250 mg total) by mouth every 12 (twelve) hours.    potassium chloride 10% (KAYCIEL) 20 mEq/15 mL solution Take by mouth once daily.     Family History     Family history is unknown by patient.        Social History Main Topics    Smoking status: Former Smoker    Smokeless tobacco: Never Used    Alcohol use No    Drug use: No    Sexual activity: No     Review of Systems   Unable to perform ROS: Dementia     Objective:     Vital Signs (Most Recent):  Temp: (!) 93.8 °F (34.3 °C) (03/10/18 1115)  Pulse: 60 (03/10/18 1115)  Resp: 20 (03/10/18 1115)  BP: (!) 86/48 (03/10/18 1105)  SpO2: 99 % (03/10/18 1115) Vital Signs (24h Range):  Temp:  [91.5 °F (33.1 °C)-97 °F (36.1 °C)] 93.8 °F (34.3 °C)  Pulse:   [54-74] 60  Resp:  [15-30] 20  SpO2:  [94 %-100 %] 99 %  BP: ()/(45-91) 86/48     Weight: 59 kg (130 lb)  Body mass index is 21.63 kg/m².    Physical Exam   Constitutional: No distress.   Cachetic and in fetal position   HENT:   Head: Atraumatic.   +temporal wasting, poor dentation but visualization poor given patient not holding mouth open as directed   Eyes:   Hold eyes closed tight, but overall conjunctiva appeared normal   Neck: Normal range of motion. Neck supple.   Cardiovascular: Normal rate and regular rhythm.    II/VI murmur   Pulmonary/Chest:   Poor effort, but no difficulty in breathing, slightly course with decrease at bases   Abdominal: Soft. Bowel sounds are normal.   Musculoskeletal: He exhibits edema.   Left arm in sling immobilizer   Neurological:   Awake, not oriented and mostly non-verbal, moves all extremities spontaneously, no focal deficits   Skin: Capillary refill takes 2 to 3 seconds. He is not diaphoretic.   Left heel ulcer, and stage III decubitus buttocks ulcers           Significant Labs: All pertinent labs within the past 24 hours have been reviewed.    Significant Imaging: I have reviewed and interpreted all pertinent imaging results/findings within the past 24 hours.

## 2018-03-10 NOTE — ED PROVIDER NOTES
"Encounter Date: 3/10/2018    SCRIBE #1 NOTE: I, Yolande Avalos, am scribing for, and in the presence of,  Crystal Meeks MD. I have scribed the following portions of the note - Other sections scribed: HPI, ROS, and PE.       History     Chief Complaint   Patient presents with    Fall     from [facility], found on floor lying on R side by staff, staff wants him checked out, pt is oriented to self which is baseline, no complaints at this time; however staff from nursing home reported he was c/o left arm pain      CC: Fall  Patient arrived via EMS    HPI: This 88 y.o male who has Hypertension, Arthritis, Hypercholesteremia, and Alzheimer disease presents to the ED for an evaluation for an unwitnessed fall that occurred just prior to arrival. EMS reports the patient arrives from [facility] after the patient was found lying on the floor by nursing staff.  EMS reports the nursing staff sending the patient to the ED to be "checked out".  EMS reports the patient is oriented to self at baseline.  EMS reports the patient denies any complaints upon arrival to the ED.  No other history could be obtained secondary to the patient's history of Alzheimer's disease.  No known prior tx.        The history is provided by the EMS personnel. No  was used.     Review of patient's allergies indicates:  No Known Allergies  Past Medical History:   Diagnosis Date    Alzheimer disease     Anemia of chronic disease     Arthritis     BPH with obstruction/lower urinary tract symptoms     Dementia     Failure to thrive in adult     High cholesterol     Hypertension     Renal disorder     Severe malnutrition     Subdural hematoma     Urinary retention due to benign prostatic hyperplasia      Past Surgical History:   Procedure Laterality Date    BACK SURGERY       Family History   Problem Relation Age of Onset    Family history unknown: Yes     Social History   Substance Use Topics    Smoking status: " Former Smoker    Smokeless tobacco: Never Used    Alcohol use No     Review of Systems   Unable to perform ROS: Dementia       Physical Exam     Initial Vitals [03/10/18 0201]   BP Pulse Resp Temp SpO2   124/80 74 16 97 °F (36.1 °C) (!) 94 %      MAP       94.67         Physical Exam    Nursing note and vitals reviewed.  Constitutional: Vital signs are normal. He appears well-developed and well-nourished. He is active.  Non-toxic appearance. No distress.   HENT:   Head: Normocephalic and atraumatic.   Eyes: EOM are normal.   Neck: Trachea normal. Neck supple.   Cardiovascular: Normal rate and regular rhythm.   Pulmonary/Chest: Breath sounds normal. No respiratory distress.   Abdominal: Soft. Normal appearance and bowel sounds are normal. He exhibits no distension. There is no tenderness.   Genitourinary: Rectal exam shows guaiac negative stool. Guaiac negative stool.   Musculoskeletal: He exhibits edema.        Left shoulder: He exhibits deformity.   Patient has contractures of the bilateral lower extremities.    Neurological: He is alert.   Patient is oriented to self.   Skin: Skin is warm, dry and intact.   Patient has ulcers to his bilateral feet.   Psychiatric:   Patient is demented.         ED Course   Critical Care  Date/Time: 3/10/2018 11:05 AM  Performed by: FAIZA HAMILTON  Authorized by: FAIZA HAMILTON   Direct patient critical care time: 30 minutes  Additional history critical care time: 10 minutes  Ordering / reviewing critical care time: 10 minutes  Documentation critical care time: 10 minutes  Consulting other physicians critical care time: 5 minutes  Other critical care time: 10 (admission) minutes  Total critical care time (exclusive of procedural time) : 75 minutes  Critical care time was exclusive of separately billable procedures and treating other patients and teaching time.  Critical care was necessary to treat or prevent imminent or life-threatening deterioration of the following  conditions: sepsis and shock.  Critical care was time spent personally by me on the following activities: discussions with consultants, interpretation of cardiac output measurements, evaluation of patient's response to treatment, examination of patient, obtaining history from patient or surrogate, ordering and performing treatments and interventions, ordering and review of laboratory studies, ordering and review of radiographic studies, pulse oximetry, re-evaluation of patient's condition and review of old charts.        Labs Reviewed   CBC W/ AUTO DIFFERENTIAL - Abnormal; Notable for the following:        Result Value    RBC 2.63 (*)     Hemoglobin 7.0 (*)     Hematocrit 21.7 (*)     MCH 26.6 (*)     RDW 20.5 (*)     Lymph # 0.8 (*)     Gran% 85.1 (*)     Lymph% 9.7 (*)     All other components within normal limits   COMPREHENSIVE METABOLIC PANEL - Abnormal; Notable for the following:     Chloride 113 (*)     CO2 19 (*)     BUN, Bld 47 (*)     Creatinine 1.8 (*)     Calcium 8.1 (*)     Total Protein 5.4 (*)     Albumin 1.4 (*)     Alkaline Phosphatase 197 (*)     eGFR if  38 (*)     eGFR if non  33 (*)     All other components within normal limits   URINALYSIS - Abnormal; Notable for the following:     Appearance, UA Cloudy (*)     Protein, UA 1+ (*)     Occult Blood UA 3+ (*)     Leukocytes, UA 3+ (*)     All other components within normal limits   PROTIME-INR - Abnormal; Notable for the following:     Prothrombin Time 12.9 (*)     All other components within normal limits   B-TYPE NATRIURETIC PEPTIDE - Abnormal; Notable for the following:      (*)     All other components within normal limits   MAGNESIUM - Abnormal; Notable for the following:     Magnesium 1.3 (*)     All other components within normal limits   TROPONIN I - Abnormal; Notable for the following:     Troponin I 0.039 (*)     All other components within normal limits   CK - Abnormal; Notable for the following:       (*)     All other components within normal limits   CK-MB - Abnormal; Notable for the following:      (*)     CPK MB 25.4 (*)     MB% 6.8 (*)     All other components within normal limits   URINALYSIS MICROSCOPIC - Abnormal; Notable for the following:     RBC, UA >100 (*)     WBC, UA >100 (*)     Bacteria, UA Many (*)     All other components within normal limits   LACTIC ACID, PLASMA - Abnormal; Notable for the following:     Lactate (Lactic Acid) 2.6 (*)     All other components within normal limits   APTT   OCCULT BLOOD X 1, STOOL   TYPE & SCREEN     EKG Readings: (Independently Interpreted)   Rhythm: Normal Sinus Rhythm. Heart Rate: 69. Ectopy: No Ectopy PVCs. Conduction: Normal. ST Segments: Normal ST Segments. T Waves: Normal. Clinical Impression: Normal Sinus Rhythm with PVCs Other Impression: left axis deviation; low volatage present          Medical Decision Making:   History:   Old Medical Records: I decided to obtain old medical records.  Initial Assessment:   88 y.o male who has Hypertension, Arthritis, Hypercholesteremia, and Alzheimer disease presents to the ED for an evaluation for an unwitnessed fall that occurred just prior to arrival. EMS reports the patient arrives from [facility] after the patient was found lying on the floor by nursing staff. On physical exam, the patient is alert.  Patient has a deformity of the left shoulder. Patient has edema throughout his body.  Patient is demented.  Differential Diagnosis:   Fall, Left upper extremity fracture, Left shoulder dislocation  Clinical Tests:   Lab Tests: Ordered and Reviewed  Radiological Study: Ordered and Reviewed  Medical Tests: Ordered and Reviewed  ED Management:  Patient was discussed with Dr Ann at shift change and his care was transitioned to him for re-evaluation and disposition.  Other:   I have discussed this case with another health care provider.       <> Summary of the Discussion: Spoke  with orthopedist, Dr. Leonard, who advised the patient be placed in a shoulder immobilizer and have the patient to follow up orthopedics on Tuesday (3/13/18).      patient presents after reported fall.  Looking at patient's contractions in extreme physical disability is unlikely the patient fell on his own.  Patient has a proximal left humeral fracture.  This was a shoulder immobilizer.  Deemed outpatient follow-up per orthopedics.  However during the ER course patient became hypothermic and hypotensive.  Responded to fluid boluses.  Patient found to have a UTI but came in with an indwelling Robles catheter.  This was not changed due to chart review showing that the catheter had to be placed in the OR by urology.  We'll admit for IV antibiotics and IV fluids and rule out sepsis.  Lactic acid pending at the time of admission due to difficult lab draw due to patient's anasarca.  Patient given IV albumin and IV magnesium.  Discussed with Dr. Jaimes.        Scribe Attestation:   Scribe #1: I performed the above scribed service and the documentation accurately describes the services I performed. I attest to the accuracy of the note.    Attending Attestation:           Physician Attestation for Scribe:  Physician Attestation Statement for Scribe #1: I, Crystal Meeks MD, reviewed documentation, as scribed by Yolande Avalos in my presence, and it is both accurate and complete.     Comments: I, Dr. Meeks, personally performed the services described in this documentation. All medical record entries made by the scribe were at my direction and in my presence.  I have reviewed the chart and agree that the record reflects my personal performance and is accurate and complete.                 Clinical Impression:   The primary encounter diagnosis was Chronic kidney disease, unspecified CKD stage. Diagnoses of Pain, Fall, Closed fracture of proximal end of left humerus, unspecified fracture morphology, initial encounter, and Anemia,  unspecified type were also pertinent to this visit.    Disposition:   Condition: Stable                        Mario Ann MD  03/11/18 2649

## 2018-03-10 NOTE — H&P
[facility] Hospital Medicine  History & Physical    Patient Name: Alen Marin  MRN: [id number]  Admission Date: 3/10/2018  Attending Physician: Maria E Jaimes MD   Primary Care Provider: [facility] Medical Records         Patient information was obtained from relative(s), past medical records and ER records.     Subjective:     Principal Problem:Sepsis    Chief Complaint:   Chief Complaint   Patient presents with    Fall     from [facility], found on floor lying on R side by staff, staff wants him checked out, pt is oriented to self which is baseline, no complaints at this time; however staff from nursing home reported he was c/o left arm pain         HPI: 89 y/o male with advanced dementia with severe malnutrition/failure to thrive, HTN, HLP, BPH with outlet obstruction with chronic chester catheter (failed voiding trial) who was sent over from [facility] after being found down on the floor. No witness to the events. Pt was in the process of starting hospice at the NH prior to his presentation. In the ER, imaging revealed chronic osseous remodeling of the bilateral glenohumeral joints with suspected superimposed comminuted, displaced fracture on left. Pt became hypotensive to 86/48 and hypothermic (93.8 degrees F) s/p IVF challenge with SBP to low 100s. No elevation in WBC, but Hgb low at 7.0, lactic acid of 2.6 and UA with many WBC, bacteria, +leukocytes. Pt cannot give hx due to advanced dementia.    Past Medical History:   Diagnosis Date    Alzheimer disease     Anemia of chronic disease     Arthritis     BPH with obstruction/lower urinary tract symptoms     Dementia     Failure to thrive in adult     High cholesterol     Hypertension     Renal disorder     Severe malnutrition     Subdural hematoma     Urinary retention due to benign prostatic hyperplasia        Past Surgical History:   Procedure Laterality Date    BACK SURGERY         Review of patient's allergies  indicates:  No Known Allergies    No current facility-administered medications on file prior to encounter.      Current Outpatient Prescriptions on File Prior to Encounter   Medication Sig    atorvastatin (LIPITOR) 10 MG tablet Take 10 mg by mouth once daily.    furosemide (LASIX) 20 MG tablet Take 20 mg by mouth 2 (two) times daily.    metoprolol succinate (TOPROL-XL) 50 MG 24 hr tablet Take 50 mg by mouth once daily.    tamsulosin (FLOMAX) 0.4 mg Cp24 Take 1 capsule (0.4 mg total) by mouth once daily.    acetaminophen (TYLENOL) 325 MG tablet Take 2 tablets (650 mg total) by mouth every 6 (six) hours as needed for Pain or Temperature greater than (or equal to 101 degree F).    ciprofloxacin HCl (CIPRO) 250 MG tablet Take 1 tablet (250 mg total) by mouth every 12 (twelve) hours.    potassium chloride 10% (KAYCIEL) 20 mEq/15 mL solution Take by mouth once daily.     Family History     Family history is unknown by patient.        Social History Main Topics    Smoking status: Former Smoker    Smokeless tobacco: Never Used    Alcohol use No    Drug use: No    Sexual activity: No     Review of Systems   Unable to perform ROS: Dementia     Objective:     Vital Signs (Most Recent):  Temp: (!) 93.8 °F (34.3 °C) (03/10/18 1115)  Pulse: 60 (03/10/18 1115)  Resp: 20 (03/10/18 1115)  BP: (!) 86/48 (03/10/18 1105)  SpO2: 99 % (03/10/18 1115) Vital Signs (24h Range):  Temp:  [91.5 °F (33.1 °C)-97 °F (36.1 °C)] 93.8 °F (34.3 °C)  Pulse:  [54-74] 60  Resp:  [15-30] 20  SpO2:  [94 %-100 %] 99 %  BP: ()/(45-91) 86/48     Weight: 59 kg (130 lb)  Body mass index is 21.63 kg/m².    Physical Exam   Constitutional: No distress.   Cachetic and in fetal position   HENT:   Head: Atraumatic.   +temporal wasting, poor dentation but visualization poor given patient not holding mouth open as directed   Eyes:   Hold eyes closed tight, but overall conjunctiva appeared normal   Neck: Normal range of motion. Neck supple.    Cardiovascular: Normal rate and regular rhythm.    II/VI murmur   Pulmonary/Chest:   Poor effort, but no difficulty in breathing, slightly course with decrease at bases   Abdominal: Soft. Bowel sounds are normal.   Musculoskeletal: He exhibits edema.   Left arm in sling immobilizer   Neurological:   Awake, not oriented and mostly non-verbal, moves all extremities spontaneously, no focal deficits   Skin: Capillary refill takes 2 to 3 seconds. He is not diaphoretic.   Left heel ulcer, and stage III decubitus buttocks ulcers           Significant Labs: All pertinent labs within the past 24 hours have been reviewed.    Significant Imaging: I have reviewed and interpreted all pertinent imaging results/findings within the past 24 hours.    Assessment/Plan:     * Sepsis    Pt met criteria for sepsis with hypothermia, hypotension, lactic acidosis with complicated UTI as source  S/p IVF challenge with appropriate response in blood pressure with no need for pressors  Continued broad spectrum antibiotics of Zosyn and Vancomycin  F/u on blood and urine cultures        Urinary tract infection associated with indwelling urethral catheter    Due to chronic catheter  Treatment as outlined above  Robles catheter not changed out due to documented difficulty with placement  Will consult Urology for exchange         Goals of care, counseling/discussion    Extensive discussion with son at the bedside about patient condition and prognosis  I have recommended DNR status, especially since son reported that the patient was going for hospice soon  I have explained that DNR status is in line with the best treatment option for the patient and is in line with his planned transition to hospice at the NH prior to his arrival at this facility  He expressed understanding and he was not ready to make him DNR yet  He wanted to speak to the rest of his family  I left message for the daughter to discuss this matter in conference but she was not  available  Will readdress this issue again, but remains full code at this time        Dementia without behavioral disturbance    Advanced and progressive  Extensive discussion done with son on nature of disease course  Pt was in the process of going into hospice  Agree with hospice being best treatment in this patient  Son considering treatment options        Humerus fracture    Chronic changes and with noted fracture on left side  Immobilized in shoulder sling  Will consult Ortho after patient is stable for recommendations        H/O traumatic subdural hematoma    Contributed to patient's progressive decline  No acute issues and head CT with chronic changes only        Stage 3 chronic kidney disease    Creatinine better than previous levels  Will monitor        Urinary retention    Continue chester catheter  Failed voiding trial in the past  Will consult Urology after stabilization for catheter exchange        Hyperlipidemia    Will continue statin therapy        Benign essential hypertension    Hold all anti-HTN medications        Severe protein-calorie malnutrition    Due to advanced dementia with failure to thrive  Expected not to improve given poor PO intake   Feeding tube is not indicated in this patient        Metabolic acidosis    Due to sepsis  Will repeat lactic acid level        Anemia of chronic disease    No bleeding reported  H/H drop reflective of malnutrition and CKD  Transfusion already ordered   Will follow levels          VTE Risk Mitigation         Ordered     Medium Risk of VTE  Once      03/10/18 1105     Reason for No Pharmacological VTE Prophylaxis  Once      03/10/18 1105        Critical care time spent on the evaluation and treatment of severe organ dysfunction, review of pertinent labs and imaging studies, discussions with consulting providers and discussions with patient/family: 60 minutes.     Maria E Jaimes MD  Department of Hospital Medicine   [facility]

## 2018-03-10 NOTE — HPI
89 y/o male with advanced dementia with severe malnutrition/failure to thrive, HTN, HLP, BPH with outlet obstruction with chronic chester catheter (failed voiding trial) who was sent over from [facility] after being found down on the floor. No witness to the events. Pt was in the process of starting hospice at the NH prior to his presentation. In the ER, imaging revealed chronic osseous remodeling of the bilateral glenohumeral joints with suspected superimposed comminuted, displaced fracture on left. Pt became hypotensive to 86/48 and hypothermic (93.8 degrees F) s/p IVF challenge with SBP to low 100s. No elevation in WBC, but Hgb low at 7.0, lactic acid of 2.6 and UA with many WBC, bacteria, +leukocytes. Pt cannot give hx due to advanced dementia.

## 2018-03-10 NOTE — ASSESSMENT & PLAN NOTE
Extensive discussion with son at the bedside about patient condition and prognosis  I have recommended DNR status, especially since son reported that the patient was going for hospice soon  I have explained that DNR status is in line with the best treatment option for the patient and is in line with his planned transition to hospice at the NH prior to his arrival at this facility  He expressed understanding and he was not ready to make him DNR yet  He wanted to speak to the rest of his family  I left message for the daughter to discuss this matter in conference but she was not available  Will readdress this issue again, but remains full code at this time

## 2018-03-10 NOTE — ASSESSMENT & PLAN NOTE
Due to chronic catheter  Treatment as outlined above  Robles catheter not changed out due to documented difficulty with placement  Will consult Urology for exchange

## 2018-03-11 LAB
ALBUMIN SERPL BCP-MCNC: 1.6 G/DL
ALP SERPL-CCNC: 156 U/L
ALT SERPL W/O P-5'-P-CCNC: 19 U/L
ANION GAP SERPL CALC-SCNC: 11 MMOL/L
AST SERPL-CCNC: 32 U/L
BASOPHILS # BLD AUTO: 0 K/UL
BASOPHILS NFR BLD: 0 %
BILIRUB SERPL-MCNC: 2.5 MG/DL
BUN SERPL-MCNC: 47 MG/DL
BURR CELLS BLD QL SMEAR: ABNORMAL
CALCIUM SERPL-MCNC: 7.7 MG/DL
CHLORIDE SERPL-SCNC: 113 MMOL/L
CO2 SERPL-SCNC: 18 MMOL/L
CORTIS SERPL-MCNC: 25.7 UG/DL
CREAT SERPL-MCNC: 1.7 MG/DL
DACRYOCYTES BLD QL SMEAR: ABNORMAL
DIFFERENTIAL METHOD: ABNORMAL
EOSINOPHIL # BLD AUTO: 0.1 K/UL
EOSINOPHIL NFR BLD: 1.2 %
ERYTHROCYTE [DISTWIDTH] IN BLOOD BY AUTOMATED COUNT: 18.5 %
EST. GFR  (AFRICAN AMERICAN): 41 ML/MIN/1.73 M^2
EST. GFR  (NON AFRICAN AMERICAN): 35 ML/MIN/1.73 M^2
GLUCOSE SERPL-MCNC: 66 MG/DL
HCT VFR BLD AUTO: 27.4 %
HGB BLD-MCNC: 9.2 G/DL
HYPOCHROMIA BLD QL SMEAR: ABNORMAL
LYMPHOCYTES # BLD AUTO: 0.9 K/UL
LYMPHOCYTES NFR BLD: 10.9 %
MAGNESIUM SERPL-MCNC: 1.3 MG/DL
MCH RBC QN AUTO: 28 PG
MCHC RBC AUTO-ENTMCNC: 33.6 G/DL
MCV RBC AUTO: 83 FL
MONOCYTES # BLD AUTO: 0.4 K/UL
MONOCYTES NFR BLD: 4.2 %
NEUTROPHILS # BLD AUTO: 7.2 K/UL
NEUTROPHILS NFR BLD: 84.2 %
OVALOCYTES BLD QL SMEAR: ABNORMAL
PHOSPHATE SERPL-MCNC: 2.7 MG/DL
PLATELET # BLD AUTO: 148 K/UL
PMV BLD AUTO: 10.4 FL
POIKILOCYTOSIS BLD QL SMEAR: SLIGHT
POLYCHROMASIA BLD QL SMEAR: ABNORMAL
POTASSIUM SERPL-SCNC: 3.6 MMOL/L
PROT SERPL-MCNC: 4.8 G/DL
RBC # BLD AUTO: 3.29 M/UL
SODIUM SERPL-SCNC: 142 MMOL/L
TARGETS BLD QL SMEAR: ABNORMAL
TROPONIN I SERPL DL<=0.01 NG/ML-MCNC: 0.04 NG/ML
VANCOMYCIN SERPL-MCNC: 10.2 UG/ML
WBC # BLD AUTO: 8.57 K/UL

## 2018-03-11 PROCEDURE — 84484 ASSAY OF TROPONIN QUANT: CPT

## 2018-03-11 PROCEDURE — 80053 COMPREHEN METABOLIC PANEL: CPT

## 2018-03-11 PROCEDURE — 80202 ASSAY OF VANCOMYCIN: CPT

## 2018-03-11 PROCEDURE — 85025 COMPLETE CBC W/AUTO DIFF WBC: CPT

## 2018-03-11 PROCEDURE — C9113 INJ PANTOPRAZOLE SODIUM, VIA: HCPCS | Performed by: EMERGENCY MEDICINE

## 2018-03-11 PROCEDURE — 25000003 PHARM REV CODE 250: Performed by: HOSPITALIST

## 2018-03-11 PROCEDURE — 63600175 PHARM REV CODE 636 W HCPCS: Performed by: EMERGENCY MEDICINE

## 2018-03-11 PROCEDURE — 63600175 PHARM REV CODE 636 W HCPCS: Performed by: HOSPITALIST

## 2018-03-11 PROCEDURE — 83735 ASSAY OF MAGNESIUM: CPT

## 2018-03-11 PROCEDURE — 25000003 PHARM REV CODE 250: Performed by: EMERGENCY MEDICINE

## 2018-03-11 PROCEDURE — 20000000 HC ICU ROOM

## 2018-03-11 PROCEDURE — 84100 ASSAY OF PHOSPHORUS: CPT

## 2018-03-11 PROCEDURE — 36415 COLL VENOUS BLD VENIPUNCTURE: CPT

## 2018-03-11 RX ORDER — LEVOTHYROXINE SODIUM ANHYDROUS 100 UG/5ML
100 INJECTION, POWDER, LYOPHILIZED, FOR SOLUTION INTRAVENOUS DAILY
Status: CANCELLED | OUTPATIENT
Start: 2018-03-11

## 2018-03-11 RX ORDER — MAGNESIUM SULFATE HEPTAHYDRATE 40 MG/ML
2 INJECTION, SOLUTION INTRAVENOUS ONCE
Status: COMPLETED | OUTPATIENT
Start: 2018-03-11 | End: 2018-03-11

## 2018-03-11 RX ADMIN — PIPERACILLIN AND TAZOBACTAM 4.5 G: 4; .5 INJECTION, POWDER, LYOPHILIZED, FOR SOLUTION INTRAVENOUS; PARENTERAL at 10:03

## 2018-03-11 RX ADMIN — ATORVASTATIN CALCIUM 10 MG: 10 TABLET, FILM COATED ORAL at 08:03

## 2018-03-11 RX ADMIN — MAGNESIUM SULFATE HEPTAHYDRATE 2 G: 40 INJECTION, SOLUTION INTRAVENOUS at 10:03

## 2018-03-11 RX ADMIN — TAMSULOSIN HYDROCHLORIDE 0.4 MG: 0.4 CAPSULE ORAL at 08:03

## 2018-03-11 RX ADMIN — VANCOMYCIN HYDROCHLORIDE 1000 MG: 1 INJECTION, POWDER, LYOPHILIZED, FOR SOLUTION INTRAVENOUS at 10:03

## 2018-03-11 RX ADMIN — SODIUM CHLORIDE: 0.45 INJECTION, SOLUTION INTRAVENOUS at 09:03

## 2018-03-11 RX ADMIN — DEXTROSE 8 MG/HR: 50 INJECTION, SOLUTION INTRAVENOUS at 08:03

## 2018-03-11 RX ADMIN — SODIUM CHLORIDE: 0.45 INJECTION, SOLUTION INTRAVENOUS at 11:03

## 2018-03-11 NOTE — PLAN OF CARE
Problem: Patient Care Overview  Goal: Plan of Care Review  Outcome: Ongoing (interventions implemented as appropriate)  Pt remains in ICU.  0.45%NS @ 100cc/hr.  VSS, temp WNL.  Urine output marginal.  Left arm remains in a sling.  Unable to comprehend, not following simple commands.  Fall & pressure ulcer prevention interventions on-going.

## 2018-03-11 NOTE — PLAN OF CARE
03/11/18 1751   Discharge Assessment   Assessment Type Discharge Planning Assessment   Confirmed/corrected address and phone number on facesheet? Yes   Assessment information obtained from? Other  (Son Miguelito Marin)   Prior to hospitilization cognitive status: Unable to Assess   Prior to hospitalization functional status: Wheelchair Bound   Current cognitive status: Coma/Sedated/Intubated   Current Functional Status: Partially Dependent   Facility Arrived From: [facility]   Lives With facility resident   Able to Return to Prior Arrangements yes   Is patient able to care for self after discharge? No   Who are your caregiver(s) and their phone number(s)? Facility staff   Patient's perception of discharge disposition other (comments)  (Patient unable to communicate.)   Readmission Within The Last 30 Days no previous admission in last 30 days   Patient currently being followed by outpatient case management? No   Patient currently receives any other outside agency services? No   Equipment Currently Used at Home walker, rolling   Do you have any problems affording any of your prescribed medications? No   Is the patient taking medications as prescribed? yes   Does the patient have transportation home? Yes   Transportation Available ambulance;van, wheelchair accessible   Does the patient receive services at the [facility]? No   Discharge Plan A Return to nursing home   Patient/Family In Agreement With Plan yes

## 2018-03-12 PROBLEM — N40.1 BPH WITH URINARY OBSTRUCTION: Status: ACTIVE | Noted: 2018-03-12

## 2018-03-12 PROBLEM — N13.8 BPH WITH URINARY OBSTRUCTION: Status: ACTIVE | Noted: 2018-03-12

## 2018-03-12 LAB
ALBUMIN SERPL BCP-MCNC: 1.4 G/DL
ALP SERPL-CCNC: 143 U/L
ALT SERPL W/O P-5'-P-CCNC: 18 U/L
ANION GAP SERPL CALC-SCNC: 9 MMOL/L
AST SERPL-CCNC: 35 U/L
BACTERIA UR CULT: NORMAL
BASOPHILS # BLD AUTO: 0.01 K/UL
BASOPHILS NFR BLD: 0.2 %
BILIRUB SERPL-MCNC: 1.2 MG/DL
BUN SERPL-MCNC: 45 MG/DL
CALCIUM SERPL-MCNC: 7.4 MG/DL
CHLORIDE SERPL-SCNC: 114 MMOL/L
CO2 SERPL-SCNC: 19 MMOL/L
CREAT SERPL-MCNC: 1.8 MG/DL
DIFFERENTIAL METHOD: ABNORMAL
EOSINOPHIL # BLD AUTO: 0.2 K/UL
EOSINOPHIL NFR BLD: 2.8 %
ERYTHROCYTE [DISTWIDTH] IN BLOOD BY AUTOMATED COUNT: 18.6 %
EST. GFR  (AFRICAN AMERICAN): 38 ML/MIN/1.73 M^2
EST. GFR  (NON AFRICAN AMERICAN): 33 ML/MIN/1.73 M^2
GLUCOSE SERPL-MCNC: 51 MG/DL
HCT VFR BLD AUTO: 24.1 %
HGB BLD-MCNC: 8 G/DL
LYMPHOCYTES # BLD AUTO: 0.7 K/UL
LYMPHOCYTES NFR BLD: 12.4 %
MAGNESIUM SERPL-MCNC: 1.6 MG/DL
MCH RBC QN AUTO: 28 PG
MCHC RBC AUTO-ENTMCNC: 33.2 G/DL
MCV RBC AUTO: 84 FL
MONOCYTES # BLD AUTO: 0.5 K/UL
MONOCYTES NFR BLD: 7.9 %
NEUTROPHILS # BLD AUTO: 4.4 K/UL
NEUTROPHILS NFR BLD: 76.4 %
PHOSPHATE SERPL-MCNC: 2.8 MG/DL
PLATELET # BLD AUTO: 160 K/UL
PMV BLD AUTO: 10.4 FL
POTASSIUM SERPL-SCNC: 3.3 MMOL/L
PROT SERPL-MCNC: 4.4 G/DL
RBC # BLD AUTO: 2.86 M/UL
SODIUM SERPL-SCNC: 142 MMOL/L
VANCOMYCIN SERPL-MCNC: 16 UG/ML
WBC # BLD AUTO: 5.81 K/UL

## 2018-03-12 PROCEDURE — 80053 COMPREHEN METABOLIC PANEL: CPT

## 2018-03-12 PROCEDURE — 20000000 HC ICU ROOM

## 2018-03-12 PROCEDURE — 94760 N-INVAS EAR/PLS OXIMETRY 1: CPT

## 2018-03-12 PROCEDURE — 92610 EVALUATE SWALLOWING FUNCTION: CPT

## 2018-03-12 PROCEDURE — 84100 ASSAY OF PHOSPHORUS: CPT

## 2018-03-12 PROCEDURE — G8997 SWALLOW GOAL STATUS: HCPCS | Mod: CJ

## 2018-03-12 PROCEDURE — 80202 ASSAY OF VANCOMYCIN: CPT

## 2018-03-12 PROCEDURE — 02HV33Z INSERTION OF INFUSION DEVICE INTO SUPERIOR VENA CAVA, PERCUTANEOUS APPROACH: ICD-10-PCS | Performed by: INTERNAL MEDICINE

## 2018-03-12 PROCEDURE — 63600175 PHARM REV CODE 636 W HCPCS: Performed by: INTERNAL MEDICINE

## 2018-03-12 PROCEDURE — 85025 COMPLETE CBC W/AUTO DIFF WBC: CPT

## 2018-03-12 PROCEDURE — 87040 BLOOD CULTURE FOR BACTERIA: CPT

## 2018-03-12 PROCEDURE — 87186 SC STD MICRODIL/AGAR DIL: CPT | Mod: 59

## 2018-03-12 PROCEDURE — G8996 SWALLOW CURRENT STATUS: HCPCS | Mod: CM

## 2018-03-12 PROCEDURE — 27000221 HC OXYGEN, UP TO 24 HOURS

## 2018-03-12 PROCEDURE — 63600175 PHARM REV CODE 636 W HCPCS: Performed by: EMERGENCY MEDICINE

## 2018-03-12 PROCEDURE — 99223 1ST HOSP IP/OBS HIGH 75: CPT | Mod: 25,,, | Performed by: UROLOGY

## 2018-03-12 PROCEDURE — C9113 INJ PANTOPRAZOLE SODIUM, VIA: HCPCS | Performed by: EMERGENCY MEDICINE

## 2018-03-12 PROCEDURE — 87077 CULTURE AEROBIC IDENTIFY: CPT

## 2018-03-12 PROCEDURE — 25000003 PHARM REV CODE 250: Performed by: HOSPITALIST

## 2018-03-12 PROCEDURE — 83735 ASSAY OF MAGNESIUM: CPT

## 2018-03-12 PROCEDURE — 51702 INSERT TEMP BLADDER CATH: CPT | Mod: ,,, | Performed by: UROLOGY

## 2018-03-12 PROCEDURE — 63600175 PHARM REV CODE 636 W HCPCS: Performed by: HOSPITALIST

## 2018-03-12 RX ORDER — POTASSIUM CHLORIDE 14.9 MG/ML
20 INJECTION INTRAVENOUS
Status: COMPLETED | OUTPATIENT
Start: 2018-03-12 | End: 2018-03-12

## 2018-03-12 RX ORDER — LORAZEPAM 2 MG/ML
2 INJECTION INTRAMUSCULAR ONCE
Status: COMPLETED | OUTPATIENT
Start: 2018-03-12 | End: 2018-03-12

## 2018-03-12 RX ADMIN — LORAZEPAM 2 MG: 2 INJECTION INTRAMUSCULAR; INTRAVENOUS at 05:03

## 2018-03-12 RX ADMIN — SODIUM CHLORIDE: 0.45 INJECTION, SOLUTION INTRAVENOUS at 10:03

## 2018-03-12 RX ADMIN — PIPERACILLIN AND TAZOBACTAM 4.5 G: 4; .5 INJECTION, POWDER, LYOPHILIZED, FOR SOLUTION INTRAVENOUS; PARENTERAL at 10:03

## 2018-03-12 RX ADMIN — POTASSIUM CHLORIDE 20 MEQ: 200 INJECTION, SOLUTION INTRAVENOUS at 12:03

## 2018-03-12 RX ADMIN — SODIUM CHLORIDE: 0.45 INJECTION, SOLUTION INTRAVENOUS at 11:03

## 2018-03-12 RX ADMIN — PIPERACILLIN AND TAZOBACTAM 4.5 G: 4; .5 INJECTION, POWDER, LYOPHILIZED, FOR SOLUTION INTRAVENOUS; PARENTERAL at 11:03

## 2018-03-12 RX ADMIN — TAMSULOSIN HYDROCHLORIDE 0.4 MG: 0.4 CAPSULE ORAL at 10:03

## 2018-03-12 RX ADMIN — ATORVASTATIN CALCIUM 10 MG: 10 TABLET, FILM COATED ORAL at 10:03

## 2018-03-12 RX ADMIN — DEXTROSE 40 MG: 50 INJECTION, SOLUTION INTRAVENOUS at 10:03

## 2018-03-12 RX ADMIN — POTASSIUM CHLORIDE 20 MEQ: 200 INJECTION, SOLUTION INTRAVENOUS at 10:03

## 2018-03-12 NOTE — PROGRESS NOTES
Ativan 2mg IV push given beforeTLC placed at bedside by Dr. Carr. CXR ordered to confirm placement, waiting for result.

## 2018-03-12 NOTE — HOSPITAL COURSE
Mr. Marin was admitted with sepsis with UTI and bacteremia. Pt was initially treated with Zosyn and Vancomycin. He has a chronic indwelling chester catheter which was changed out by Urology given difficulty of placement in the past. He also has a humerus fracture that appears acute on chronic and was treated with sling for immobilization. Ortho consulted for recommendations after stabilization but patient unlikely to be a surgical candidate. Pt antibiotics changed to Rocephin and Vancomycin based on sensitivities with urine remarkable for E.coli and Enterococcus faecalis and blood culture remarkable for Enterococcus and Staph species. Repeat blood culture remarkable for persistent Staph species. ECHO ordered to r/o endocarditis. Pt with advanced dementia with failure to thrive and severe malnutrition, and he has failed speech swallow evaluation. Overall prognosis poor for any meaningful recovery. Pt is hospice appropriate and I have recommended DNR status and hospice, however, son with POA not ready or willing to make patient DNR or hospice. Pt currently on PPN temporarily for nutritional support.    3/14- SW assisted with DNR discussion and he is in agreement with DNR after watching Wil video; hope to proceed with hospice in am  3/15- pt seen by  yesterday with plan to continue puree diet/thin liquids as tolerated and for pleasure; Continue sling to left arm, wound care orders given to NH/hospice, discontinue IV antibiotics and TPN, code status updated to DNRkenroy to be signed by healthcare representative and [facility]; continue chester for urinary retention and comfort

## 2018-03-12 NOTE — HPI
Patient was sent to [facility] from [facility] after he was found down.  Concern is sepsis    He was sent to the hospital in January 2018 due elevated creatinine (Cr 9.5). VINCE showed bilateral hydronephrosis and a distended bladder therefore Urology was consulted. Per notes patient was voiding at this time. Unsuccessful attempt at chester placement made by Dr. Price at the bedside.  Subsequently he was taken to OR for cystoscopy bilateral retrograde pyelogram and chester placement on 1/31/18. 1000 ml of purulent ronald urine were drained with chester placement. No urine cx available.  ARF with slow improvement throughout hospital course, Cr 2.6 on day of discharge in early February.     He had an unsuccessful VOT in the urologyoffice on 2/20/18. He is currently taking Flomax which was started during his initial visit with me. He had another unsuccessful voiding trial on 2/26/2018.  He was seen on 3/7/2018 and set up for a TURP.

## 2018-03-12 NOTE — SUBJECTIVE & OBJECTIVE
Interval History: Pt with no acute events    Review of Systems   Unable to perform ROS: Dementia     Objective:     Vital Signs (Most Recent):  Temp: 97.9 °F (36.6 °C) (03/11/18 2315)  Pulse: 80 (03/11/18 2315)  Resp: 18 (03/11/18 2315)  BP: (!) 102/58 (03/11/18 2300)  SpO2: 96 % (03/11/18 2315) Vital Signs (24h Range):  Temp:  [97.9 °F (36.6 °C)-98.4 °F (36.9 °C)] 97.9 °F (36.6 °C)  Pulse:  [70-91] 80  Resp:  [12-40] 18  SpO2:  [85 %-100 %] 96 %  BP: ()/(49-83) 102/58     Weight: 61.8 kg (136 lb 3.9 oz)  Body mass index is 22.67 kg/m².    Intake/Output Summary (Last 24 hours) at 03/12/18 0027  Last data filed at 03/11/18 2229   Gross per 24 hour   Intake             1975 ml   Output              695 ml   Net             1280 ml      Physical Exam   Constitutional: No distress.   Cachetic and in fetal position   HENT:   Head: Atraumatic.   +temporal wasting, poor dentation but visualization poor    Eyes:   Conjunctiva appeared normal   Neck: Normal range of motion. Neck supple.   Cardiovascular: Normal rate and regular rhythm.    II/VI murmur   Pulmonary/Chest:   Poor effort, but no difficulty in breathing, slightly course with decrease at bases   Abdominal: Soft. Bowel sounds are normal.   Musculoskeletal: He exhibits edema.   Left arm in sling immobilizer   Neurological:   Awake, not oriented and mostly non-verbal, moves all extremities spontaneously, no focal deficits   Skin: Capillary refill takes 2 to 3 seconds. He is not diaphoretic.   Left heel ulcer, and stage III decubitus buttocks ulcers       Significant Labs: All pertinent labs within the past 24 hours have been reviewed.    Significant Imaging: I have reviewed and interpreted all pertinent imaging results/findings within the past 24 hours.

## 2018-03-12 NOTE — PROGRESS NOTES
Lab unable to obtain blood cultures do to 4+ pitting edema to both arms and hands. Unable to obtain blood for labs. 22g started in right index finger. Dr. Carr notified.

## 2018-03-12 NOTE — PLAN OF CARE
Problem: SLP Goal  Goal: SLP Goal  STGs  1. Pt will participate in beside reassess.   Outcome: Ongoing (interventions implemented as appropriate)  3/12/18 Bedside swallow completed; Pt's current TODD is not appropriate for safe, sustained po intake ST RECS: NPO; crush oral meds in puree and check for residue. Will reassess tomorrow. Isabella Merino, CURT-SLP

## 2018-03-12 NOTE — ASSESSMENT & PLAN NOTE
Pt met criteria for sepsis with hypothermia, hypotension, lactic acidosis with complicated UTI as source  S/p IVF challenge with appropriate response in blood pressure with no need for pressors  Continued broad spectrum antibiotics of Zosyn and Vancomycin  Blood and urine cultures with E. Coli and Enterococcus, and GO cocci in chains resembling Strep  Will repeat blood culture tomorrow to assess for clearing

## 2018-03-12 NOTE — SUBJECTIVE & OBJECTIVE
Past Medical History:   Diagnosis Date    Alzheimer disease     Anemia of chronic disease     Arthritis     BPH with obstruction/lower urinary tract symptoms     Dementia     Failure to thrive in adult     High cholesterol     Hypertension     Renal disorder     Severe malnutrition     Subdural hematoma     Urinary retention due to benign prostatic hyperplasia        Past Surgical History:   Procedure Laterality Date    BACK SURGERY         Review of patient's allergies indicates:  No Known Allergies    Family History     Family history is unknown by patient.          Social History Main Topics    Smoking status: Former Smoker    Smokeless tobacco: Never Used    Alcohol use No    Drug use: No    Sexual activity: No       Review of Systems   Unable to perform ROS: Dementia       Objective:     Temp:  [96 °F (35.6 °C)-98.3 °F (36.8 °C)] 96 °F (35.6 °C)  Pulse:  [60-96] 60  Resp:  [13-34] 18  SpO2:  [83 %-100 %] 100 %  BP: ()/(49-84) 115/84     Body mass index is 23.08 kg/m².      Date 03/12/18 0700 - 03/13/18 0659   Shift 8183-2343 3949-8931 9088-0666 24 Hour Total   I  N  T  A  K  E   I.V.  (mL/kg) 100  (1.6)   100  (1.6)    Shift Total  (mL/kg) 100  (1.6)   100  (1.6)   O  U  T  P  U  T   Shift Total  (mL/kg)       Weight (kg) 62.9 62.9 62.9 62.9          Drains     Drain                 Urethral Catheter 03/10/18 0300 Latex 2 days                Physical Exam   Nursing note and vitals reviewed.  Constitutional: He is oriented to person, place, and time. He appears well-developed and well-nourished.   HENT:   Head: Normocephalic.   Eyes: Conjunctivae are normal.   Neck: Normal range of motion. Neck supple. No tracheal deviation present. No thyromegaly present.   Cardiovascular: Normal rate and normal heart sounds.    Pulmonary/Chest: Effort normal and breath sounds normal. No respiratory distress. He has no wheezes.   Abdominal: Soft. Bowel sounds are normal. There is no hepatosplenomegaly.  There is no tenderness. There is no rebound and no CVA tenderness. No hernia.   Genitourinary:   Genitourinary Comments: 18 Fr Robles in place with yellow urine   Musculoskeletal: Normal range of motion. He exhibits no edema or tenderness.   Lymphadenopathy:     He has no cervical adenopathy.   Neurological: He is alert and oriented to person, place, and time.   Skin: Skin is warm and dry. No rash noted. No erythema.     Psychiatric: He has a normal mood and affect. His behavior is normal. Judgment and thought content normal.       Significant Labs:    BMP:    Recent Labs  Lab 03/10/18  0406 03/11/18  0021 03/12/18  0607    142 142   K 4.3 3.6 3.3*   * 113* 114*   CO2 19* 18* 19*   BUN 47* 47* 45*   CREATININE 1.8* 1.7* 1.8*   CALCIUM 8.1* 7.7* 7.4*       CBC:    Recent Labs  Lab 03/10/18  0406 03/11/18  0021 03/12/18  0607   WBC 7.86 8.57 5.81   HGB 7.0* 9.2* 8.0*   HCT 21.7* 27.4* 24.1*    148* 160       Blood Culture:   Recent Labs  Lab 03/10/18  0900 03/10/18  0935   LABBLOO Gram stain aer bottle: Gram positive cocci in chains resembling Strep   Results called to and read back by: Bryant Gonzalez in ICU  03/11/2018    01:12  ENTEROCOCCUS SPECIESIdentification and susceptibility pending Gram stain aer bottle: Gram positive cocci in chains resembling Strep  previously called  ENTEROCOCCUS SPECIESFor susceptibility see order #[id number]     Urine Culture:   Recent Labs  Lab 03/10/18  0500 03/10/18  0505   LABURIN ESCHERICHIA COLI> 100,000 cfu/ml  ENTEROCOCCUS SPECIES> 100,000 cfu/mlIdentification and susceptibility pending ESCHERICHIA COLI> 100,000 cfu/ml  ENTEROCOCCUS SPECIES> 100,000 cfu/mlIdentification and susceptibility pending       Robles placement  With Clarke LAWS at the bedside.  I removed his Robles.  Using sterile technique, an 18 Fr Coude Tip Robles was placed without difficulty.  The catheter was irrigated with 120 mL saline and irrigated appropriately.   Robles to  gravity.

## 2018-03-12 NOTE — PROGRESS NOTES
[facility]  [place] Note    Patient Name: Alen Marin  MRN: [id number]  Patient Class: IP- Inpatient   Admission Date: 3/10/2018  Length of Stay: 2 days  Attending Physician: Maria E Jaimes MD  Primary Care Provider[facility]        Subjective:     Principal Problem:Sepsis    HPI:  89 y/o male with advanced dementia with severe malnutrition/failure to thrive, HTN, HLP, BPH with outlet obstruction with chronic chester catheter (failed voiding trial) who was sent over from [facility] after being found down on the floor. No witness to the events. Pt was in the process of starting hospice at the NH prior to his presentation. In the ER, imaging revealed chronic osseous remodeling of the bilateral glenohumeral joints with suspected superimposed comminuted, displaced fracture on left. Pt became hypotensive to 86/48 and hypothermic (93.8 degrees F) s/p IVF challenge with SBP to low 100s. No elevation in WBC, but Hgb low at 7.0, lactic acid of 2.6 and UA with many WBC, bacteria, +leukocytes. Pt cannot give hx due to advanced dementia.    Hospital Course:  Mr. Marin was admitted with sepsis with UTI and bacteremia    Interval History: Pt with no acute events    Review of Systems   Unable to perform ROS: Dementia     Objective:     Vital Signs (Most Recent):  Temp: 97.9 °F (36.6 °C) (03/11/18 2315)  Pulse: 80 (03/11/18 2315)  Resp: 18 (03/11/18 2315)  BP: (!) 102/58 (03/11/18 2300)  SpO2: 96 % (03/11/18 2315) Vital Signs (24h Range):  Temp:  [97.9 °F (36.6 °C)-98.4 °F (36.9 °C)] 97.9 °F (36.6 °C)  Pulse:  [70-91] 80  Resp:  [12-40] 18  SpO2:  [85 %-100 %] 96 %  BP: ()/(49-83) 102/58     Weight: 61.8 kg (136 lb 3.9 oz)  Body mass index is 22.67 kg/m².    Intake/Output Summary (Last 24 hours) at 03/12/18 0027  Last data filed at 03/11/18 2228   Gross per 24 hour   Intake             1975 ml   Output              695 ml   Net             1280 ml       Physical Exam   Constitutional: No distress.   Cachetic and in fetal position   HENT:   Head: Atraumatic.   +temporal wasting, poor dentation but visualization poor    Eyes:   Conjunctiva appeared normal   Neck: Normal range of motion. Neck supple.   Cardiovascular: Normal rate and regular rhythm.    II/VI murmur   Pulmonary/Chest:   Poor effort, but no difficulty in breathing, slightly course with decrease at bases   Abdominal: Soft. Bowel sounds are normal.   Musculoskeletal: He exhibits edema.   Left arm in sling immobilizer   Neurological:   Awake, not oriented and mostly non-verbal, moves all extremities spontaneously, no focal deficits   Skin: Capillary refill takes 2 to 3 seconds. He is not diaphoretic.   Left heel ulcer, and stage III decubitus buttocks ulcers       Significant Labs: All pertinent labs within the past 24 hours have been reviewed.    Significant Imaging: I have reviewed and interpreted all pertinent imaging results/findings within the past 24 hours.    Assessment/Plan:      * Sepsis    Pt met criteria for sepsis with hypothermia, hypotension, lactic acidosis with complicated UTI as source  S/p IVF challenge with appropriate response in blood pressure with no need for pressors  Continued broad spectrum antibiotics of Zosyn and Vancomycin  Blood and urine cultures with E. Coli and Enterococcus, and GO cocci in chains resembling Strep  Will repeat blood culture tomorrow to assess for clearing        Urinary tract infection associated with indwelling urethral catheter    Due to chronic catheter  Treatment as outlined above  Robles catheter not changed out due to documented difficulty with placement  Urine culture with E. Coli and Enteroccus  Will consult Urology for exchange         Goals of care, counseling/discussion    Extensive discussion with son at the bedside about patient condition and prognosis  I have recommended DNR status, especially since son reported that the patient was going for  hospice soon  I have explained that DNR status is in line with the best treatment option for the patient and is in line with his planned transition to hospice at the NH prior to his arrival at this facility  He expressed understanding and he was not ready to make him DNR yet  He wanted to speak to the rest of his family  I left message for the daughter to discuss this matter in conference but she was not available  Will readdress this issue again, but remains full code at this time        Dementia without behavioral disturbance    Advanced and progressive  Extensive discussion done with son on nature of disease course  Pt was in the process of going into hospice  Agree with hospice being best treatment in this patient  Son considering treatment options        Humerus fracture    Chronic changes and with noted fracture on left side  Immobilized in shoulder sling  Will consult Ortho after patient is stable for recommendations        H/O traumatic subdural hematoma    Contributed to patient's progressive decline  No acute issues and head CT with chronic changes only        Stage 3 chronic kidney disease    Creatinine better than previous levels  Will monitor        Urinary retention    Continue chester catheter  Failed voiding trial in the past  Will consult Urology after stabilization for catheter exchange        Hyperlipidemia    Will continue statin therapy        Benign essential hypertension    Hold all anti-HTN medications        Severe protein-calorie malnutrition    Due to advanced dementia with failure to thrive  Expected not to improve given poor PO intake   Feeding tube is not indicated in this patient        Metabolic acidosis    Due to sepsis  Will repeat lactic acid level        Anemia of chronic disease    No bleeding reported  H/H drop reflective of malnutrition and CKD  Transfusion already ordered   Will follow levels          VTE Risk Mitigation         Ordered     Medium Risk of VTE  Once      03/10/18  1105     Reason for No Pharmacological VTE Prophylaxis  Once      03/10/18 1105          Critical care time spent on the evaluation and treatment of severe organ dysfunction, review of pertinent labs and imaging studies, discussions with consulting providers and discussions with patient/family: 35 minutes.    Maria E Jaimes MD  Department of Hospital Medicine   [facility]

## 2018-03-12 NOTE — CONSULTS
[facility]  Urology  Consult Note    Patient Name: Alen Marin  MRN: [id number]  Admission Date: 3/10/2018  Hospital Length of Stay: 2   Code Status: Full Code   Attending Provider: Maria E Jaimes MD   Consulting Provider: SJ Price MD  Primary Care Physician: [facility] Medical Records  Principal Problem:Sepsis    Inpatient consult to Urology  Consult performed by: MARCY PRICE  Consult ordered by: MARIA E JAIMES          Subjective:     HPI:  Patient was sent to [facility] from [facility] after he was found down.  Concern is sepsis    He was sent to the hospital in January 2018 due elevated creatinine (Cr 9.5). VINCE showed bilateral hydronephrosis and a distended bladder therefore Urology was consulted. Per notes patient was voiding at this time. Unsuccessful attempt at chester placement made by Dr. Price at the bedside.  Subsequently he was taken to OR for cystoscopy bilateral retrograde pyelogram and chester placement on 1/31/18. 1000 ml of purulent ronald urine were drained with chester placement. No urine cx available.  ARF with slow improvement throughout hospital course, Cr 2.6 on day of discharge in early February.     He had an unsuccessful VOT in the urologyoffice on 2/20/18. He is currently taking Flomax which was started during his initial visit with me. He had another unsuccessful voiding trial on 2/26/2018.  He was seen on 3/7/2018 and set up for a TURP.    Past Medical History:   Diagnosis Date    Alzheimer disease     Anemia of chronic disease     Arthritis     BPH with obstruction/lower urinary tract symptoms     Dementia     Failure to thrive in adult     High cholesterol     Hypertension     Renal disorder     Severe malnutrition     Subdural hematoma     Urinary retention due to benign prostatic hyperplasia        Past Surgical History:   Procedure Laterality Date    BACK SURGERY         Review of patient's allergies indicates:  No Known  Allergies    Family History     Family history is unknown by patient.          Social History Main Topics    Smoking status: Former Smoker    Smokeless tobacco: Never Used    Alcohol use No    Drug use: No    Sexual activity: No       Review of Systems   Unable to perform ROS: Dementia       Objective:     Temp:  [96 °F (35.6 °C)-98.3 °F (36.8 °C)] 96 °F (35.6 °C)  Pulse:  [60-96] 60  Resp:  [13-34] 18  SpO2:  [83 %-100 %] 100 %  BP: ()/(49-84) 115/84     Body mass index is 23.08 kg/m².      Date 03/12/18 0700 - 03/13/18 0659   Shift 8535-8202 5478-3413 2295-3521 24 Hour Total   I  N  T  A  K  E   I.V.  (mL/kg) 100  (1.6)   100  (1.6)    Shift Total  (mL/kg) 100  (1.6)   100  (1.6)   O  U  T  P  U  T   Shift Total  (mL/kg)       Weight (kg) 62.9 62.9 62.9 62.9          Drains     Drain                 Urethral Catheter 03/10/18 0300 Latex 2 days                Physical Exam   Nursing note and vitals reviewed.  Constitutional: He is oriented to person, place, and time. He appears well-developed and well-nourished.   HENT:   Head: Normocephalic.   Eyes: Conjunctivae are normal.   Neck: Normal range of motion. Neck supple. No tracheal deviation present. No thyromegaly present.   Cardiovascular: Normal rate and normal heart sounds.    Pulmonary/Chest: Effort normal and breath sounds normal. No respiratory distress. He has no wheezes.   Abdominal: Soft. Bowel sounds are normal. There is no hepatosplenomegaly. There is no tenderness. There is no rebound and no CVA tenderness. No hernia.   Genitourinary:   Genitourinary Comments: 18 Fr Robles in place with yellow urine   Musculoskeletal: Normal range of motion. He exhibits no edema or tenderness.   Lymphadenopathy:     He has no cervical adenopathy.   Neurological: He is alert and oriented to person, place, and time.   Skin: Skin is warm and dry. No rash noted. No erythema.     Psychiatric: He has a normal mood and affect. His behavior is normal. Judgment and  thought content normal.       Significant Labs:    BMP:    Recent Labs  Lab 03/10/18  0406 03/11/18  0021 03/12/18  0607    142 142   K 4.3 3.6 3.3*   * 113* 114*   CO2 19* 18* 19*   BUN 47* 47* 45*   CREATININE 1.8* 1.7* 1.8*   CALCIUM 8.1* 7.7* 7.4*       CBC:    Recent Labs  Lab 03/10/18  0406 03/11/18  0021 03/12/18  0607   WBC 7.86 8.57 5.81   HGB 7.0* 9.2* 8.0*   HCT 21.7* 27.4* 24.1*    148* 160       Blood Culture:   Recent Labs  Lab 03/10/18  0900 03/10/18  0935   LABBLOO Gram stain aer bottle: Gram positive cocci in chains resembling Strep   Results called to and read back by: Bryant Gonzalez in ICU  03/11/2018    01:12  ENTEROCOCCUS SPECIESIdentification and susceptibility pending Gram stain aer bottle: Gram positive cocci in chains resembling Strep  previously called  ENTEROCOCCUS SPECIESFor susceptibility see order #[id number]     Urine Culture:   Recent Labs  Lab 03/10/18  0500 03/10/18  0505   LABURIN ESCHERICHIA COLI> 100,000 cfu/ml  ENTEROCOCCUS SPECIES> 100,000 cfu/mlIdentification and susceptibility pending ESCHERICHIA COLI> 100,000 cfu/ml  ENTEROCOCCUS SPECIES> 100,000 cfu/mlIdentification and susceptibility pending       Robles placement  With Clarke LAWS at the bedside.  I removed his Robles.  Using sterile technique, an 18 Fr Coude Tip Robles was placed without difficulty.  The catheter was irrigated with 120 mL saline and irrigated appropriately.   Robles to gravity.                      Assessment and Plan:     BPH with urinary obstruction    Stay on Flomax        Urinary retention    Robles changed on 3/12/2018 without difficulty  Currently scheduled for TURP on 3/21/2018.  Will keep on the schedule for now and treat infection.  Will need to talk to family about utility of a TURP in the setting of going on Hospice.            VTE Risk Mitigation         Ordered     Medium Risk of VTE  Once      03/10/18 1105     Reason for No Pharmacological VTE Prophylaxis  Once       03/10/18 0186          Thank you for your consult. I will follow-up with patient. Please contact us if you have any additional questions.    SJ Price MD  Urology  [facility]

## 2018-03-12 NOTE — ASSESSMENT & PLAN NOTE
Due to chronic catheter  Treatment as outlined above  Robles catheter not changed out due to documented difficulty with placement  Urine culture with E. Coli and Enteroccus  Will consult Urology for exchange

## 2018-03-12 NOTE — PLAN OF CARE
03/12/18 1005   Discharge Reassessment   Assessment Type Discharge Planning Reassessment   Provided patient/caregiver education on the expected discharge date and the discharge plan No   Do you have any problems affording any of your prescribed medications? No   Discharge Plan A Hospice/home;Return to nursing home   Discharge Plan B Return to Nursing Home   Patient choice form signed by patient/caregiver No   Can the patient answer the patient profile reliably? No, cognitively impaired   How does the patient rate their overall health at the present time? Poor   Describe the patient's ability to walk at the present time. Does not walk or unable to take any steps at all   How often would a person be available to care for the patient? Whenever needed   Number of comorbid conditions (as recorded on the chart) Five or more   During the past month, has the patient often been bothered by feeling down, depressed or hopeless? No   During the past month, has the patient often been bothered by little interest or pleasure in doing things? No   patient remains in ICU. SHAHNAZ reviewed chart, reviewed with Dr Jaimes, received call from Regina with [facility]. Patient son had signed the forms to start [facility] at [facility] for 3/10 as patient was SNF thru 2/5-3/9. Patient son however did not want to change code status thus remained full code. Patient admitted to hospital during night prior to hospice nurse arriving for the formal admission process. At discharge, Regina informs that patient needs no other forms completed for [facility] to admit. Should have the order for the return to [facility] with [facility]. Per Dr Jaimes, patient will probably benefit from hospital stay 2-3 more days, then be stable to return to nursing home where he resides with his wife. Son Miguelito Marin who makes all decisions not in room when SW rounded. Patient was sleeping soundly. SHAHNAZ added contact information to communication  board. SW will follow in ICU, assist as needed.

## 2018-03-12 NOTE — PLAN OF CARE
Recommendations     Recommendation/Intervention:   1. Safe diet advancement per SLP   2. If TF warranted rec: Isosource 1.5 initiated @ 10 ml/hr and advanced 10 ml q 6 hrs to goal rate of 45 ml/hr + 2 packets beneprotein/day.   -Fluid flushes for normal fluid intake: 140 ml q 4 hrs or per MD.   -TF to provide: 1670 kcal, 85g pro, 825 ml fluid.   -Hold TF x 4 hrs for residuals >250 ml (consider prokinetics) or for sx/o n/v/abd discomfort; HOB >30.   2. RD to montior     Goals: Safety to po diet; Initiate nutrition within 72 hrs  Nutrition Goal Status: new  Communication of RD Recs: discussed on rounds     D/C planning: Too soon to determine

## 2018-03-12 NOTE — ASSESSMENT & PLAN NOTE
Robles changed on 3/12/2018 without difficulty  Currently scheduled for TURP on 3/21/2018.  Will keep on the schedule for now and treat infection.  Will need to talk to family about utility of a TURP in the setting of going on Hospice.

## 2018-03-12 NOTE — PLAN OF CARE
Problem: Patient Care Overview  Goal: Plan of Care Review  Outcome: Ongoing (interventions implemented as appropriate)  Patient 4+ pitting edema to arms and hands. LE also have moderate edema. IV restarted to right index finger and fluids infusing as ordered. Lab unable to draw blood cultures do to severe swelling and also not able to obtain blood for labs. Dr. Carr notified. Patient screams when he is moved, bathed, turned. Legs contracted bilaterally.

## 2018-03-12 NOTE — PROGRESS NOTES
"  [facility]  Adult Nutrition  Consult Note    SUMMARY     Recommendations    Recommendation/Intervention:   1. Safe diet advancement per SLP   2. If TF warranted rec: Isosource 1.5 initiated @ 10 ml/hr and advanced 10 ml q 6 hrs to goal rate of 45 ml/hr + 2 packets beneprotein/day.   -Fluid flushes for normal fluid intake: 140 ml q 4 hrs or per MD.   -TF to provide: 1670 kcal, 85g pro, 825 ml fluid.   -Hold TF x 4 hrs for residuals >250 ml (consider prokinetics) or for sx/o n/v/abd discomfort; HOB >30.   2. RD to montior    Goals: Safety to po diet; Initiate nutrition within 72 hrs  Nutrition Goal Status: new  Communication of RD Recs: discussed on rounds    D/C planning: Too soon to determine    Reason for Assessment    Reason for Assessment: identified at risk by screening criteria  Diagnosis:  (Sepsis)  Relevant Medical History: HTN, HLD, FTT, malnutrition  Interdisciplinary Rounds: attended  General Information Comments: Patient NPO x 2 days. Advanced dementia, not following commands. Per MD no feeding tube warranted at this time. Goal for hospice. Multipl wounds. Mild to moderate loss of lean body mass noted. Alb 1.9 with 4+ edema.  SLP recs NPO. Prev on pureed diet.    Nutrition Risk Screen    Nutrition Risk Screen: large or nonhealing wound, burn or pressure ulcer    Nutrition/Diet History    Do you have any cultural, spiritual, Yazidism conflicts, given your current situation?: FRANCO  Factors Affecting Nutritional Intake: NPO    Anthropometrics    Temp: 96 °F (35.6 °C)  Height Method: Estimated  Height: 5' 5" (165.1 cm)  Height (inches): 65 in  Weight Method: Bed Scale  Weight: 62.9 kg (138 lb 10.7 oz)  Weight (lb): 138.67 lb  Ideal Body Weight (IBW), Male: 136 lb  % Ideal Body Weight, Male (lb): 101.96 lb  BMI (Calculated): 23.1  BMI Grade: 18.5-24.9 - normal      Lab/Procedures/Meds    Pertinent Labs Reviewed: reviewed  Pertinent Labs Comments: alb 1.4  Pertinent Medications Reviewed: " "reviewed    Physical Findings/Assessment    Overall Physical Appearance: advanced age, loss of muscle mass (4+ edema)  Oral/Mouth Cavity: tooth/teeth missing  Skin: pressure ulcer(s) (Stage II x 3; unstagealbe x 2)    Estimated/Assessed Needs    Weight Used For Calorie Calculations: 62.9 kg (138 lb 10.7 oz)  Height: 5' 5" (165.1 cm)  Energy Calorie Requirements (kcal): 9369-4034 kcal  Energy Need Method: [place]-St Jeor    RMR ([place]-St. Jeor Equation): 1225.88  Protein Requirements: 75-95g  Weight Used For Protein Calculations: 62.9 kg (138 lb 10.7 oz)    Fluid Need Method: RDA Method  RDA Method (mL): 1550       Nutrition Prescription Ordered    Current Diet Order: NPO    Evaluation of Received Nutrient/Fluid Intake    IV Fluid (mL): 2400  I/O: 2650/510  Energy Calories Required: not meeting needs  Protein Required: not meeting needs  Fluid Required: meeting needs (per MD)    Nutrition Risk    Level of Risk/Frequency of Follow-up:  (2 x week)     Assessment and Plan    Nutrition Dx; Inadequate Energy Intake r/t AMS as evidenced by SLP rec NPO  Nutrition Dx Status: New    Monitor and Evaluation    Food and Nutrient Intake: energy intake, food and beverage intake, enteral nutrition intake  Food and Nutrient Adminstration: diet order, enteral and parenteral nutrition administration  Knowledge/Beliefs/Attitudes: food and nutrition knowledge/skill  Physical Activity and Function: nutrition-related ADLs and IADLs  Anthropometric Measurements: weight, weight change  Biochemical Data, Medical Tests and Procedures: electrolyte and renal panel, glucose/endocrine profile  Nutrition-Focused Physical Findings: overall appearance, skin     Nutrition Follow-Up    RD Follow-up?: Yes        "

## 2018-03-12 NOTE — PROCEDURES
"Alen Marin is a 88 y.o. male patient.    Temp: 97.9 °F (36.6 °C) (03/12/18 0315)  Pulse: 69 (03/12/18 0415)  Resp: 17 (03/12/18 0415)  BP: 114/65 (03/12/18 0400)  SpO2: 99 % (03/12/18 0415)  Weight: 62.9 kg (138 lb 10.7 oz) (03/12/18 0300)  Height: 5' 5" (165.1 cm) (03/10/18 0201)       Central Line  Date/Time: 3/12/2018 5:51 AM  Performed by: MAU GUPTA  Consent Done: Yes  Time out: Immediately prior to procedure a "time out" was called to verify the correct patient, procedure, equipment, support staff and site/side marked as required.  Indications: med administration  Preparation: skin prepped with ChloraPrep  Skin prep agent dried: skin prep agent completely dried prior to procedure  Sterile barriers: all five maximum sterile barriers used - cap, mask, sterile gown, sterile gloves, and large sterile sheet  Hand hygiene: hand hygiene performed prior to central venous catheter insertion  Location details: left internal jugular  Catheter type: triple lumen  Catheter size: 7 Fr  Catheter Length: 16cm    Ultrasound guidance: yes  Vessel Caliber: large, patent, compressibility normal  Needle advanced into vessel with real time Ultrasound guidance.  Sterile sheath used.  Manometry: No   Number of attempts: 2  Assessment: placement verified by x-ray and no pneumothorax on x-ray  Technical procedures used: Seldinger  Complications: none  Estimated blood loss (mL): 10.  Specimens: No  Implants: No  Post-procedure: line sutured,  chlorhexidine patch,  sterile dressing applied and blood return through all ports  Comments: First attempt at left subclavian vein was aborted as patient was agitated and moved around too much to be safe.  I then switched over to the left internal jugular vein approach and was able to insert the central line without any further complications.      Mau Gupta M.D.  [facility]  Department of Hospital Medicine  [facility]  Pager: [phone]    "

## 2018-03-12 NOTE — PT/OT/SLP EVAL
Speech Language Pathology Evaluation  Bedside Swallow    Patient Name:  Alen Marin   MRN:  [id number]  Admitting Diagnosis: Sepsis    Recommendations:                 General Recommendations:  Dysphagia therapy  Diet recommendations:   ,   NPO  Aspiration Precautions: Meds crushed in puree   General Precautions: Standard, aspiration, NPO  Communication strategies:  simple sentences    History:     Past Medical History:   Diagnosis Date    Alzheimer disease     Anemia of chronic disease     Arthritis     BPH with obstruction/lower urinary tract symptoms     Dementia     Failure to thrive in adult     High cholesterol     Hypertension     Renal disorder     Severe malnutrition     Subdural hematoma     Urinary retention due to benign prostatic hyperplasia        Past Surgical History:   Procedure Laterality Date    BACK SURGERY         Social History: Patient lives at [facility].    Modified Barium Swallow: n/a    Chest X-Rays: 3/12 Mild worsening attenuation within the lung bases concerning for atelectasis, aspiration or pneumonia with possibly trace dependent pleural effusions.    Prior diet: puree with thin liquids    Subjective   Pt remained non verbal throughout  Patient goals: unable to state at this time.     Pain/Comfort:  · Pain Rating 1: 0/10    Objective:     Oral Musculature Evaluation  · Oral Musculature: unable to assess due to poor participation/comprehension    Bedside Swallow Eval:   Consistencies Assessed:  · Thin liquids X3  · Puree X3     Oral Phase:   · Decreased closure around utensil  · Oral residue  · Poor oral acceptance  · Slow oral transit time   · Decreased attention to bolus presentation and bolus once placed.   · Require max verbal cuing to initiate swallow for puree  · Swallow delay    -----Pt's current TODD is not appropriate for safe, sustained po intake     Pharyngeal Phase:   · no overt clinical signs/symptoms of aspiration   · Swallow delay    Compensatory  Strategies  · None    Treatment: ongoing assessment of swallow    Assessment:     Alen Marin is a 88 y.o. male with diagnosis of sepsis he presents with severe oral dysphagia c/b decreased attention to bolus and bolus presentation.     Goals:    SLP Goals        Problem: SLP Goal    Goal Priority Disciplines Outcome   SLP Goal    Medium SLP Ongoing (interventions implemented as appropriate)   Description:  STGs  1. Pt will participate in beside reassess.                     Plan:     · Patient to be seen:  2 x/week   · Plan of Care expires:  03/23/18  · Plan of Care reviewed with:      · SLP Follow-Up:  Yes       Discharge recommendations:    NH  Barriers to Discharge:  Safety Awareness .    Time Tracking:     SLP Treatment Date:   03/12/18  Speech Start Time:  1150  Speech Stop Time:  1200     Speech Total Time (min):  10 min    Billable Minutes: Eval Swallow and Oral Function 10    Isabella Merino CCC-SLP  03/12/2018

## 2018-03-13 PROBLEM — R32 INCONTINENCE ASSOCIATED DERMATITIS: Status: ACTIVE | Noted: 2018-03-13

## 2018-03-13 PROBLEM — L89.890 UNSTAGEABLE PRESSURE ULCER OF LEFT FOOT: Status: ACTIVE | Noted: 2018-03-13

## 2018-03-13 PROBLEM — R78.81 BACTEREMIA: Status: ACTIVE | Noted: 2018-03-13

## 2018-03-13 PROBLEM — L25.8 INCONTINENCE ASSOCIATED DERMATITIS: Status: ACTIVE | Noted: 2018-03-13

## 2018-03-13 LAB
ALBUMIN SERPL BCP-MCNC: 1.4 G/DL
ALP SERPL-CCNC: 145 U/L
ALT SERPL W/O P-5'-P-CCNC: 19 U/L
ANION GAP SERPL CALC-SCNC: 11 MMOL/L
AST SERPL-CCNC: 29 U/L
BACTERIA BLD CULT: NORMAL
BASOPHILS # BLD AUTO: 0.01 K/UL
BASOPHILS NFR BLD: 0.2 %
BILIRUB SERPL-MCNC: 1.4 MG/DL
BUN SERPL-MCNC: 42 MG/DL
CALCIUM SERPL-MCNC: 7.4 MG/DL
CHLORIDE SERPL-SCNC: 113 MMOL/L
CO2 SERPL-SCNC: 16 MMOL/L
CREAT SERPL-MCNC: 1.8 MG/DL
DIFFERENTIAL METHOD: ABNORMAL
EOSINOPHIL # BLD AUTO: 0.3 K/UL
EOSINOPHIL NFR BLD: 4.4 %
ERYTHROCYTE [DISTWIDTH] IN BLOOD BY AUTOMATED COUNT: 18.8 %
EST. GFR  (AFRICAN AMERICAN): 38 ML/MIN/1.73 M^2
EST. GFR  (NON AFRICAN AMERICAN): 33 ML/MIN/1.73 M^2
GLUCOSE SERPL-MCNC: 31 MG/DL
HCT VFR BLD AUTO: 24.4 %
HGB BLD-MCNC: 8.1 G/DL
LYMPHOCYTES # BLD AUTO: 1 K/UL
LYMPHOCYTES NFR BLD: 16.7 %
MAGNESIUM SERPL-MCNC: 1.4 MG/DL
MCH RBC QN AUTO: 28 PG
MCHC RBC AUTO-ENTMCNC: 33.2 G/DL
MCV RBC AUTO: 84 FL
MONOCYTES # BLD AUTO: 0.5 K/UL
MONOCYTES NFR BLD: 8.4 %
NEUTROPHILS # BLD AUTO: 4.3 K/UL
NEUTROPHILS NFR BLD: 70 %
PHOSPHATE SERPL-MCNC: 2.7 MG/DL
PLATELET # BLD AUTO: 165 K/UL
PMV BLD AUTO: 10.7 FL
POCT GLUCOSE: 120 MG/DL (ref 70–110)
POCT GLUCOSE: 121 MG/DL (ref 70–110)
POTASSIUM SERPL-SCNC: 3.6 MMOL/L
PROT SERPL-MCNC: 4.7 G/DL
RBC # BLD AUTO: 2.89 M/UL
SODIUM SERPL-SCNC: 140 MMOL/L
VANCOMYCIN SERPL-MCNC: 15.6 UG/ML
WBC # BLD AUTO: 6.1 K/UL

## 2018-03-13 PROCEDURE — B4185 PARENTERAL SOL 10 GM LIPIDS: HCPCS | Performed by: HOSPITALIST

## 2018-03-13 PROCEDURE — 84100 ASSAY OF PHOSPHORUS: CPT

## 2018-03-13 PROCEDURE — 36415 COLL VENOUS BLD VENIPUNCTURE: CPT

## 2018-03-13 PROCEDURE — 25000003 PHARM REV CODE 250: Performed by: INTERNAL MEDICINE

## 2018-03-13 PROCEDURE — 80202 ASSAY OF VANCOMYCIN: CPT

## 2018-03-13 PROCEDURE — 63600175 PHARM REV CODE 636 W HCPCS: Performed by: EMERGENCY MEDICINE

## 2018-03-13 PROCEDURE — C9113 INJ PANTOPRAZOLE SODIUM, VIA: HCPCS | Performed by: EMERGENCY MEDICINE

## 2018-03-13 PROCEDURE — 63600175 PHARM REV CODE 636 W HCPCS: Performed by: INTERNAL MEDICINE

## 2018-03-13 PROCEDURE — 80053 COMPREHEN METABOLIC PANEL: CPT

## 2018-03-13 PROCEDURE — S5010 5% DEXTROSE AND 0.45% SALINE: HCPCS | Performed by: INTERNAL MEDICINE

## 2018-03-13 PROCEDURE — 85025 COMPLETE CBC W/AUTO DIFF WBC: CPT

## 2018-03-13 PROCEDURE — G8996 SWALLOW CURRENT STATUS: HCPCS | Mod: CM

## 2018-03-13 PROCEDURE — 94761 N-INVAS EAR/PLS OXIMETRY MLT: CPT

## 2018-03-13 PROCEDURE — G8997 SWALLOW GOAL STATUS: HCPCS | Mod: CJ

## 2018-03-13 PROCEDURE — 25000003 PHARM REV CODE 250: Performed by: HOSPITALIST

## 2018-03-13 PROCEDURE — 11000001 HC ACUTE MED/SURG PRIVATE ROOM

## 2018-03-13 PROCEDURE — 99232 SBSQ HOSP IP/OBS MODERATE 35: CPT | Mod: ,,, | Performed by: UROLOGY

## 2018-03-13 PROCEDURE — 92526 ORAL FUNCTION THERAPY: CPT

## 2018-03-13 PROCEDURE — 25000003 PHARM REV CODE 250

## 2018-03-13 PROCEDURE — 83735 ASSAY OF MAGNESIUM: CPT

## 2018-03-13 PROCEDURE — 63600175 PHARM REV CODE 636 W HCPCS: Performed by: HOSPITALIST

## 2018-03-13 RX ORDER — DEXTROSE MONOHYDRATE AND SODIUM CHLORIDE 5; .45 G/100ML; G/100ML
INJECTION, SOLUTION INTRAVENOUS CONTINUOUS
Status: DISCONTINUED | OUTPATIENT
Start: 2018-03-13 | End: 2018-03-13

## 2018-03-13 RX ORDER — MORPHINE SULFATE 4 MG/ML
2 INJECTION, SOLUTION INTRAMUSCULAR; INTRAVENOUS EVERY 4 HOURS PRN
Status: DISCONTINUED | OUTPATIENT
Start: 2018-03-13 | End: 2018-03-15 | Stop reason: HOSPADM

## 2018-03-13 RX ORDER — ENOXAPARIN SODIUM 100 MG/ML
40 INJECTION SUBCUTANEOUS EVERY 24 HOURS
Status: DISCONTINUED | OUTPATIENT
Start: 2018-03-13 | End: 2018-03-13

## 2018-03-13 RX ORDER — DEXTROSE 50 % IN WATER (D50W) INTRAVENOUS SYRINGE
Status: COMPLETED
Start: 2018-03-13 | End: 2018-03-13

## 2018-03-13 RX ORDER — MAGNESIUM SULFATE HEPTAHYDRATE 40 MG/ML
2 INJECTION, SOLUTION INTRAVENOUS ONCE
Status: COMPLETED | OUTPATIENT
Start: 2018-03-13 | End: 2018-03-13

## 2018-03-13 RX ORDER — CEFTRIAXONE 1 G/1
1 INJECTION, POWDER, FOR SOLUTION INTRAMUSCULAR; INTRAVENOUS
Status: DISCONTINUED | OUTPATIENT
Start: 2018-03-13 | End: 2018-03-13 | Stop reason: CLARIF

## 2018-03-13 RX ORDER — HEPARIN SODIUM 5000 [USP'U]/ML
5000 INJECTION, SOLUTION INTRAVENOUS; SUBCUTANEOUS EVERY 12 HOURS
Status: DISCONTINUED | OUTPATIENT
Start: 2018-03-13 | End: 2018-03-15

## 2018-03-13 RX ADMIN — CASTOR OIL AND BALSAM, PERU: 788; 87 OINTMENT TOPICAL at 10:03

## 2018-03-13 RX ADMIN — HEPARIN SODIUM 5000 UNITS: 5000 INJECTION, SOLUTION INTRAVENOUS; SUBCUTANEOUS at 09:03

## 2018-03-13 RX ADMIN — VANCOMYCIN HYDROCHLORIDE 1000 MG: 1 INJECTION, POWDER, LYOPHILIZED, FOR SOLUTION INTRAVENOUS at 04:03

## 2018-03-13 RX ADMIN — MORPHINE SULFATE 2 MG: 4 INJECTION INTRAVENOUS at 03:03

## 2018-03-13 RX ADMIN — DEXTROSE AND SODIUM CHLORIDE: 5; .45 INJECTION, SOLUTION INTRAVENOUS at 04:03

## 2018-03-13 RX ADMIN — PIPERACILLIN AND TAZOBACTAM 4.5 G: 4; .5 INJECTION, POWDER, LYOPHILIZED, FOR SOLUTION INTRAVENOUS; PARENTERAL at 10:03

## 2018-03-13 RX ADMIN — CEFTRIAXONE SODIUM 1 G: 1 INJECTION, POWDER, FOR SOLUTION INTRAMUSCULAR; INTRAVENOUS at 11:03

## 2018-03-13 RX ADMIN — DEXTROSE 40 MG: 50 INJECTION, SOLUTION INTRAVENOUS at 09:03

## 2018-03-13 RX ADMIN — I.V. FAT EMULSION 250 ML: 20 EMULSION INTRAVENOUS at 10:03

## 2018-03-13 RX ADMIN — DEXTROSE MONOHYDRATE 25 G: 25 INJECTION, SOLUTION INTRAVENOUS at 04:03

## 2018-03-13 RX ADMIN — HEPARIN SODIUM 5000 UNITS: 5000 INJECTION, SOLUTION INTRAVENOUS; SUBCUTANEOUS at 03:03

## 2018-03-13 RX ADMIN — MAGNESIUM SULFATE HEPTAHYDRATE 2 G: 40 INJECTION, SOLUTION INTRAVENOUS at 09:03

## 2018-03-13 RX ADMIN — PIPERACILLIN AND TAZOBACTAM 4.5 G: 4; .5 INJECTION, POWDER, LYOPHILIZED, FOR SOLUTION INTRAVENOUS; PARENTERAL at 09:03

## 2018-03-13 RX ADMIN — RETINOL, ERGOCALCIFEROL, .ALPHA.-TOCOPHEROL ACETATE, DL-, PHYTONADIONE, ASCORBIC ACID, NIACINAMIDE, RIBOFLAVIN 5-PHOSPHATE SODIUM, THIAMINE HYDROCHLORIDE, PYRIDOXINE HYDROCHLORIDE, DEXPANTHENOL, BIOTIN, FOLIC ACID, AND CYANOCOBALAMIN: KIT at 11:03

## 2018-03-13 NOTE — ASSESSMENT & PLAN NOTE
Pt met criteria for sepsis with hypothermia, hypotension, lactic acidosis with complicated UTI as source  S/p IVF challenge with appropriate response in blood pressure with no need for pressors  Continued broad spectrum antibiotics of Zosyn and Vancomycin  Blood and urine cultures with E. Coli and Enterococcus, and G+ cocci in chains resembling Strep  Repeat blood culture today to assess for clearing

## 2018-03-13 NOTE — PLAN OF CARE
Problem: Patient Care Overview  Goal: Plan of Care Review  Outcome: Ongoing (interventions implemented as appropriate)  VSS. NSR on cardiac monitor. Arouses to voice. Oriented to self only. Mumbles, incomprehensible speech. Irritable with interventions. Muscle rigidity. Left arm sling in place. RUE more edematous/reddened-Dr Carr aware. Several areas of skin breakdown and excoriation. Dressings and Prevalon boots applied. Turned Q2hrs. VTE prophylaxis initiated. Morphine ordered PRN, administered once prior to bath/linen change. PRN Vanc (1G) given as ordered-random 16 this am. Glucose 31, 25g D50 given IVP. IVF changed. Wound care consulted, LM for Kim Santana. No new falls, injuries or trauma throughout shift. All safety precautions maintained. Plan of care reviewed and discussed with pt sonMiguelito. All questions answered.

## 2018-03-13 NOTE — SUBJECTIVE & OBJECTIVE
Interval History: Pt with no acute events. More alert.    Review of Systems   Unable to perform ROS: Dementia     Objective:     Vital Signs (Most Recent):  Temp: 98 °F (36.7 °C) (03/12/18 2330)  Pulse: 100 (03/13/18 0100)  Resp: 17 (03/13/18 0100)  BP: 110/70 (03/13/18 0100)  SpO2: 99 % (03/13/18 0100) Vital Signs (24h Range):  Temp:  [96 °F (35.6 °C)-98 °F (36.7 °C)] 98 °F (36.7 °C)  Pulse:  [] 100  Resp:  [13-42] 17  SpO2:  [83 %-100 %] 99 %  BP: ()/(53-84) 110/70     Weight: 62.9 kg (138 lb 10.7 oz)  Body mass index is 23.08 kg/m².    Intake/Output Summary (Last 24 hours) at 03/13/18 0124  Last data filed at 03/13/18 0000   Gross per 24 hour   Intake             2130 ml   Output              875 ml   Net             1255 ml      Physical Exam   Constitutional: No distress.   Cachetic and in fetal position   HENT:   Head: Atraumatic.   +temporal wasting, poor dentation but visualization poor    Eyes:   Conjunctiva appeared normal   Neck: Normal range of motion. Neck supple.   Cardiovascular: Normal rate and regular rhythm.    II/VI murmur   Pulmonary/Chest:   Poor effort, but no difficulty in breathing, slightly course with decrease at bases   Abdominal: Soft. Bowel sounds are normal.   Musculoskeletal: He exhibits edema.   Left arm in sling immobilizer   Neurological:   Awake, not oriented and mostly non-verbal, moves all extremities spontaneously, no focal deficits   Skin: Capillary refill takes 2 to 3 seconds. He is not diaphoretic.   Left heel ulcer, and stage III decubitus buttocks ulcers       Significant Labs: All pertinent labs within the past 24 hours have been reviewed.    Significant Imaging: I have reviewed and interpreted all pertinent imaging results/findings within the past 24 hours.

## 2018-03-13 NOTE — PROGRESS NOTES
[facility]  [place]    Patient Name: Alen Marin  MRN: [id number]  Admission Date: 3/10/2018  Hospital Length of Stay: 3 days  Code Status: Full Code   Attending Provider: Maria E Jaimes MD   Primary Care Physician: [facility] Medical Records    Subjective:     HPI:  Patient was sent to [facility] from [facility] after he was found down.  Concern is sepsis    He was sent to the hospital in January 2018 due elevated creatinine (Cr 9.5). VINCE showed bilateral hydronephrosis and a distended bladder therefore Urology was consulted. Per notes patient was voiding at this time. Unsuccessful attempt at chester placement made by Dr. Price at the bedside.  Subsequently he was taken to OR for cystoscopy bilateral retrograde pyelogram and chester placement on 1/31/18. 1000 ml of purulent ronald urine were drained with chester placement. No urine cx available.  ARF with slow improvement throughout hospital course, Cr 2.6 on day of discharge in early February.     He had an unsuccessful VOT in the urologyoffice on 2/20/18. He is currently taking Flomax which was started during his initial visit with me. He had another unsuccessful voiding trial on 2/26/2018.  He was seen on 3/7/2018 and set up for a TURP.    Interval History: Chester draining well. Changed by Dr Price yesterday.     Review of Systems   Unable to perform ROS: Dementia     Objective:     Temp:  [96.5 °F (35.8 °C)-98.7 °F (37.1 °C)] 98.7 °F (37.1 °C)  Pulse:  [] 84  Resp:  [13-42] 17  SpO2:  [87 %-100 %] 88 %  BP: ()/(53-77) 114/69     Body mass index is 23.85 kg/m².            Drains     Drain                 Urethral Catheter 03/12/18 0800 Coude 18 Fr. 1 day                Physical Exam   Vitals reviewed.  Constitutional: He appears well-developed and well-nourished. No distress.   HENT:   Head: Normocephalic and atraumatic.   Eyes: Right eye exhibits no discharge. Left eye exhibits no discharge. No scleral icterus.    Neck: Normal range of motion. Neck supple.   Cardiovascular: Normal rate and regular rhythm.    Pulmonary/Chest: Effort normal and breath sounds normal. No respiratory distress.   Abdominal: Soft. Bowel sounds are normal. He exhibits no distension. There is no tenderness. There is no rebound and no guarding.   Genitourinary:   Genitourinary Comments: Robles - clear yellow urine   Musculoskeletal: Normal range of motion.   Skin: Skin is warm and dry. He is not diaphoretic. No erythema.         Significant Labs:    BMP:    Recent Labs  Lab 03/11/18  0021 03/12/18  0607 03/13/18  0310    142 140   K 3.6 3.3* 3.6   * 114* 113*   CO2 18* 19* 16*   BUN 47* 45* 42*   CREATININE 1.7* 1.8* 1.8*   CALCIUM 7.7* 7.4* 7.4*       CBC:     Recent Labs  Lab 03/11/18  0021 03/12/18  0607 03/13/18  0310   WBC 8.57 5.81 6.10   HGB 9.2* 8.0* 8.1*   HCT 27.4* 24.1* 24.4*   * 160 165       Blood Culture:   Recent Labs  Lab 03/10/18  0935 03/12/18  0600 03/12/18  0606   LABBLOO Gram stain aer bottle: Gram positive cocci in chains resembling Strep  previously called  ENTEROCOCCUS FAECALISFor susceptibility see order #[id number] Gram stain aer bottle: Gram positive cocci in clusters resembling Staph   Results called to and read back by:Allie Whaley in ICU  03/13/2018    02:28 Gram stain aer bottle: Gram positive cocci in clusters resembling Staph   Positive results previously called     Urine Culture:   Recent Labs  Lab 03/10/18  0500 03/10/18  0505   LABURIN ESCHERICHIA COLI> 100,000 cfu/ml  ENTEROCOCCUS FAECALIS> 100,000 cfu/ml ESCHERICHIA COLI> 100,000 cfu/ml  ENTEROCOCCUS FAECALIS> 100,000 cfu/ml     Urine Studies:   Recent Labs  Lab 03/10/18  0500   COLORU Yellow   APPEARANCEUA Cloudy*   PHUR 5.0   SPECGRAV 1.010   PROTEINUA 1+*   GLUCUA Negative   KETONESU Negative   BILIRUBINUA Negative   OCCULTUA 3+*   NITRITE Negative   UROBILINOGEN Negative   LEUKOCYTESUR 3+*   RBCUA >100*   WBCUA >100*   BACTERIA  Many*   SQUAMEPITHEL few   HYALINECASTS none       Significant Imaging:  All pertinent imaging results/findings from the past 24 hours have been reviewed.                  Assessment/Plan:     BPH with urinary obstruction    Flomax        Urinary retention    Robles changed on 3/12/2018 without difficulty  Currently scheduled for TURP on 3/21/2018.  Will keep on the schedule for now and treat infection.  Will need to talk to family about utility of a TURP in the setting of going on Hospice.          Will sign off for now. Please call with further questions/concerns.     VTE Risk Mitigation         Ordered     heparin (porcine) injection 5,000 Units  Every 12 hours     Route:  Subcutaneous        03/13/18 0315     Medium Risk of VTE  Once      03/10/18 1105     Reason for No Pharmacological VTE Prophylaxis  Once      03/10/18 1105          Carline Tavarez MD  Urology  [facility]

## 2018-03-13 NOTE — PHYSICIAN QUERY
"PT Name: Alen Marin  MR #: [id number]     Physician Query Form - Documentation Clarification      CDS: Lauryn Ventura RN, CCDS         Contact information :[phone]  [email or web address]       This form is a permanent document in the medical record.     Query Date: March 13, 2018    By submitting this query, we are merely seeking further clarification of documentation. Please utilize your independent clinical judgment when addressing the question(s) below.    The Medical record reflects the following:    Supporting Clinical Findings Location in Medical Record     "Sepsis"  "with complicated UTI as source"  "Urinary tract infection associated with indwelling urethral catheter"  "Continued broad spectrum antibiotics of Zosyn and Vancomycin  F/u on blood and urine cultures"    Urine culture   ESCHERICHIA COLI   > 100,000 cfu/ml   ENTEROCOCCUS FAECALIS   > 100,000 cfu/ml     Blood culture  ENTEROCOCCUS FAECALIS     H&P 3/10/18                Lab  3/10/18            Lab 3/10/18                                                                                Doctor, Please specify diagnosis or diagnoses associated with above clinical findings.  Please document known or suspected organism(s) related to sepsis diagnosis.  Please select all that apply.    Provider Use Only      ___X_ESCHERICHIA COLI     ___X_ENTEROCOCCUS FAECALIS     ____other, ___                                                                                                           [  ] Clinically undetermined            "

## 2018-03-13 NOTE — CONSULTS
Nursing consult for skin breakdown sacrum, heels, knees-POA  An 88 year old male admitted 3/10/18 from [facility] with sepsis; UTI; CKD Stage III; pain; fall; closed fracture of proximal end of left humerus; anemia  PMH: Alzheimer's disease; anemia of chronic disease; arthritis; BPH with obstruction/lower urinary tract symptoms; dementia; failure to thrive; high cholesterol; HTN; renal disorder; severe malnutrition; subdural hematoma; urinary retention due to BPH  3/13 WBC 6.1 Hgb 8.1 Hct 24.4 Alb 1.4  On Isolibrium mattress; severely contracted-lower extremity flexion; chronic chester; dependent bed mobility; wearing Sundance boots  Assessment:  Photodocumentation    Left medial foot- DTI 2 mm circular area great toe with epidermis intact. Unstageable pressure injury great toe MTP with eschar roof. Wound 2.5 circular wound without drainage/erythema    Gluteal cleft/buttocks- Incontinence associated dermatitis with skin resurfacing with epithelium.   Left and right medial knees- 2 mm circular Stage 2 pressure injuries  Treatment:  Local wound care to pressure injuries with foam dressing. Continue Pressure Injury Prevention Interventions with frequent pressure shifts. Venelex for treatment of IAD gluteal cleft/buttocks.

## 2018-03-13 NOTE — PLAN OF CARE
Problem: Patient Care Overview  Goal: Individualization & Mutuality   03/13/18 1735   Individualization   Patient Specific Goals Son would like for father to get better     Pt is very lethargic, unable to give po medications this am due to lethargic.  Pt is current on IVF and received all ABX as order.  Wound care nurse assess and order ointment to be apply bid to sacral area, wound care describe wound to sacral area as wet not a pressure ulcer.  Pt has been reposition through out the day with pillows between knees to keep knees from rubbing on each other.  Cauda catheter patient with yellow urine in  bag.  Pt only c/o pain during reposition or any activity.  No pain medication given.  Will continue to monitor.  Goal: Interdisciplinary Rounds/Family Conf  Outcome: Ongoing (interventions implemented as appropriate)   03/13/18 1735   Interdisciplinary Rounds/Family Conf   Participants other (see comments)  (wound care nurse)       Problem: Skin Integrity Impairment, Risk/Actual (Adult)  Goal: Identify Related Risk Factors and Signs and Symptoms  Related risk factors and signs and symptoms are identified upon initiation of Human Response Clinical Practice Guideline (CPG)   Outcome: Ongoing (interventions implemented as appropriate)   03/13/18 1735   Skin Integrity Impairment, Risk/Actual   Related Risk Factors (Skin Integrity Impairment, Risk/Actual) immobility     Trypsin-balsam gallito-oil ointment apply to fold of sacral area per wound care nurse order for wound care.

## 2018-03-13 NOTE — PLAN OF CARE
Problem: SLP Goal  Goal: SLP Goal  STGs  1. Pt will participate in ongoing assessment of swallow  Outcome: Ongoing (interventions implemented as appropriate)  3/13 ST bedside reassess; no significant improvement as compared to yesterday; ST continues to REC NPO secondary to inappropriate TODD for safe po. Isabella Merino, CCC-SLP

## 2018-03-13 NOTE — PROGRESS NOTES
[facility] Medicine  Progress Note    Patient Name: Alen Marin  MRN: [id number]  Patient Class: IP- Inpatient   Admission Date: 3/10/2018  Length of Stay: 3 days  Attending Physician: Maria E Jaimes MD  [facility]        Subjective:     Principal Problem:Sepsis    HPI:  89 y/o male with advanced dementia with severe malnutrition/failure to thrive, HTN, HLP, BPH with outlet obstruction with chronic chester catheter (failed voiding trial) who was sent over from [facility] after being found down on the floor. No witness to the events. Pt was in the process of starting hospice at the NH prior to his presentation. In the ER, imaging revealed chronic osseous remodeling of the bilateral glenohumeral joints with suspected superimposed comminuted, displaced fracture on left. Pt became hypotensive to 86/48 and hypothermic (93.8 degrees F) s/p IVF challenge with SBP to low 100s. No elevation in WBC, but Hgb low at 7.0, lactic acid of 2.6 and UA with many WBC, bacteria, +leukocytes. Pt cannot give hx due to advanced dementia.    Hospital Course:  Mr. Marin was admitted with sepsis with UTI and bacteremia.    Interval History: Pt with no acute events. More alert.    Review of Systems   Unable to perform ROS: Dementia     Objective:     Vital Signs (Most Recent):  Temp: 98 °F (36.7 °C) (03/12/18 2330)  Pulse: 100 (03/13/18 0100)  Resp: 17 (03/13/18 0100)  BP: 110/70 (03/13/18 0100)  SpO2: 99 % (03/13/18 0100) Vital Signs (24h Range):  Temp:  [96 °F (35.6 °C)-98 °F (36.7 °C)] 98 °F (36.7 °C)  Pulse:  [] 100  Resp:  [13-42] 17  SpO2:  [83 %-100 %] 99 %  BP: ()/(53-84) 110/70     Weight: 62.9 kg (138 lb 10.7 oz)  Body mass index is 23.08 kg/m².    Intake/Output Summary (Last 24 hours) at 03/13/18 0124  Last data filed at 03/13/18 0000   Gross per 24 hour   Intake             2130 ml   Output              875 ml   Net             1255 ml       Physical Exam   Constitutional: No distress.   Cachetic and in fetal position   HENT:   Head: Atraumatic.   +temporal wasting, poor dentation but visualization poor    Eyes:   Conjunctiva appeared normal   Neck: Normal range of motion. Neck supple.   Cardiovascular: Normal rate and regular rhythm.    II/VI murmur   Pulmonary/Chest:   Poor effort, but no difficulty in breathing, slightly course with decrease at bases   Abdominal: Soft. Bowel sounds are normal.   Musculoskeletal: He exhibits edema.   Left arm in sling immobilizer   Neurological:   Awake, not oriented and mostly non-verbal, moves all extremities spontaneously, no focal deficits   Skin: Capillary refill takes 2 to 3 seconds. He is not diaphoretic.   Left heel ulcer, and stage III decubitus buttocks ulcers       Significant Labs: All pertinent labs within the past 24 hours have been reviewed.    Significant Imaging: I have reviewed and interpreted all pertinent imaging results/findings within the past 24 hours.    Assessment/Plan:      * Sepsis    Pt met criteria for sepsis with hypothermia, hypotension, lactic acidosis with complicated UTI as source  S/p IVF challenge with appropriate response in blood pressure with no need for pressors  Continued broad spectrum antibiotics of Zosyn and Vancomycin  Blood and urine cultures with E. Coli and Enterococcus, and G+ cocci in chains resembling Strep  Repeat blood culture today to assess for clearing        Urinary tract infection associated with indwelling urethral catheter    Due to chronic catheter  Treatment as outlined above  Robles catheter not changed out due to documented difficulty with placement  Urine culture with E. Coli and Enteroccus  Will consult Urology for exchange         Goals of care, counseling/discussion    Extensive discussion with son at the bedside about patient condition and prognosis  I have recommended DNR status, especially since son reported that the patient was going for hospice  soon  I have explained that DNR status is in line with the best treatment option for the patient and is in line with his planned transition to hospice at the NH prior to his arrival at this facility  He expressed understanding and he was not ready to make him DNR yet  He wanted to speak to the rest of his family  I left message for the daughter to discuss this matter in conference but she was not available  Will readdress this issue again, but remains full code at this time        Dementia without behavioral disturbance    Advanced and progressive  Extensive discussion done with son on nature of disease course  Pt was in the process of going into hospice  Agree with hospice being best treatment in this patient  Son considering treatment options        Humerus fracture    Chronic changes and with noted fracture on left side  Immobilized in shoulder sling  Will consult Ortho after patient is stable for recommendations        H/O traumatic subdural hematoma    Contributed to patient's progressive decline  No acute issues and head CT with chronic changes only        Stage 3 chronic kidney disease    Creatinine better than previous levels  Will monitor        Urinary retention    Continue chester catheter  Failed voiding trial in the past  Will consult Urology after stabilization for catheter exchange        Hyperlipidemia    Will continue statin therapy        Benign essential hypertension    Hold all anti-HTN medications        Severe protein-calorie malnutrition    Due to advanced dementia with failure to thrive  Expected not to improve given poor PO intake   Feeding tube is not indicated in this patient        Metabolic acidosis    Due to sepsis  Will repeat lactic acid level        Anemia of chronic disease    No bleeding reported  H/H drop reflective of malnutrition and CKD  Transfusion already ordered   Will follow levels          VTE Risk Mitigation         Ordered     Medium Risk of VTE  Once      03/10/18 1105      Reason for No Pharmacological VTE Prophylaxis  Once      03/10/18 1105          Critical care time spent on the evaluation and treatment of severe organ dysfunction, review of pertinent labs and imaging studies, discussions with consulting providers and discussions with patient/family: 35 minutes.    Maria E Jaimes MD  Department of Hospital Medicine   [facility]

## 2018-03-13 NOTE — PT/OT/SLP PROGRESS
Speech Language Pathology Treatment    Patient Name:  Alen Marin   MRN:  [id number]  Admitting Diagnosis: Sepsis    Recommendations:                 General Recommendations:  Dysphagia therapy  Diet recommendations:   ,   NPO  Aspiration Precautions: Ice chips sparingly   General Precautions: Standard, aspiration, NPO  Communication strategies:  simple sentences    Subjective   Pt sleeping soundly upon ST entrance.   Patient goals: unable to state    Pain/Comfort:  · Pain Rating 1: 0/10    Objective:     Has the patient been evaluated by SLP for swallowing?   Yes  Keep patient NPO? Yes   Current Respiratory Status: room air      Pt repositioned upright in be, he required max tactile and verbal cuing to wake to accept ice chip X1, he began oral prep and fell back asleep, despite more cuing- did not re-attend to bolus prep. Pharyngeal phase of swallow was not initiated, anterior loss of melted ice chip trial and thin liquids not.     Assessment:     Alen Marin is a 88 y.o. male with dx of sepsis he presents with severe oral dysphagia c/b poor attention to bolus negatively impacted by poor TODD.    Goals:    SLP Goals        Problem: SLP Goal    Goal Priority Disciplines Outcome   SLP Goal    Medium SLP Ongoing (interventions implemented as appropriate)   Description:  STGs  1. Pt will participate in ongoing assessment of swallow                    Plan:     · Patient to be seen:  2 x/week   · Plan of Care expires:  03/23/18  · SLP Follow-Up:  Yes       Discharge recommendations:    NH  Barriers to Discharge:  Level of Skilled Assistance Needed .    Time Tracking:     SLP Treatment Date:   03/13/18  Speech Start Time:  1210  Speech Stop Time:  1220     Speech Total Time (min):  10 min    Billable Minutes: Treatment Swallowing Dysfunction 10    Isabella Merino CCC-SLP  03/13/2018

## 2018-03-13 NOTE — SUBJECTIVE & OBJECTIVE
Interval History: Robles draining well. Changed by Dr Price yesterday.     Review of Systems   Unable to perform ROS: Dementia     Objective:     Temp:  [96.5 °F (35.8 °C)-98.7 °F (37.1 °C)] 98.7 °F (37.1 °C)  Pulse:  [] 84  Resp:  [13-42] 17  SpO2:  [87 %-100 %] 88 %  BP: ()/(53-77) 114/69     Body mass index is 23.85 kg/m².            Drains     Drain                 Urethral Catheter 03/12/18 0800 Coude 18 Fr. 1 day                Physical Exam   Vitals reviewed.  Constitutional: He appears well-developed and well-nourished. No distress.   HENT:   Head: Normocephalic and atraumatic.   Eyes: Right eye exhibits no discharge. Left eye exhibits no discharge. No scleral icterus.   Neck: Normal range of motion. Neck supple.   Cardiovascular: Normal rate and regular rhythm.    Pulmonary/Chest: Effort normal and breath sounds normal. No respiratory distress.   Abdominal: Soft. Bowel sounds are normal. He exhibits no distension. There is no tenderness. There is no rebound and no guarding.   Genitourinary:   Genitourinary Comments: Robles - clear yellow urine   Musculoskeletal: Normal range of motion.   Skin: Skin is warm and dry. He is not diaphoretic. No erythema.         Significant Labs:    BMP:    Recent Labs  Lab 03/11/18  0021 03/12/18  0607 03/13/18  0310    142 140   K 3.6 3.3* 3.6   * 114* 113*   CO2 18* 19* 16*   BUN 47* 45* 42*   CREATININE 1.7* 1.8* 1.8*   CALCIUM 7.7* 7.4* 7.4*       CBC:     Recent Labs  Lab 03/11/18  0021 03/12/18  0607 03/13/18  0310   WBC 8.57 5.81 6.10   HGB 9.2* 8.0* 8.1*   HCT 27.4* 24.1* 24.4*   * 160 165       Blood Culture:   Recent Labs  Lab 03/10/18  0935 03/12/18  0600 03/12/18  0606   LABBLOO Gram stain aer bottle: Gram positive cocci in chains resembling Strep  previously called  ENTEROCOCCUS FAECALISFor susceptibility see order #[id number] Gram stain aer bottle: Gram positive cocci in clusters resembling Staph   Results called to and read back  by:Allie Whaley in ICU  03/13/2018    02:28 Gram stain aer bottle: Gram positive cocci in clusters resembling Staph   Positive results previously called     Urine Culture:   Recent Labs  Lab 03/10/18  0500 03/10/18  0505   LABURIN ESCHERICHIA COLI> 100,000 cfu/ml  ENTEROCOCCUS FAECALIS> 100,000 cfu/ml ESCHERICHIA COLI> 100,000 cfu/ml  ENTEROCOCCUS FAECALIS> 100,000 cfu/ml     Urine Studies:   Recent Labs  Lab 03/10/18  0500   COLORU Yellow   APPEARANCEUA Cloudy*   PHUR 5.0   SPECGRAV 1.010   PROTEINUA 1+*   GLUCUA Negative   KETONESU Negative   BILIRUBINUA Negative   OCCULTUA 3+*   NITRITE Negative   UROBILINOGEN Negative   LEUKOCYTESUR 3+*   RBCUA >100*   WBCUA >100*   BACTERIA Many*   SQUAMEPITHEL few   HYALINECASTS none       Significant Imaging:  All pertinent imaging results/findings from the past 24 hours have been reviewed.

## 2018-03-13 NOTE — PROGRESS NOTES
03/12/18 2017   Patient Assessment/Suction   Level of Consciousness (AVPU) alert   PRE-TX-O2-ETCO2   O2 Device (Oxygen Therapy) room air   SpO2 99 %   Pulse Oximetry Type Continuous   Pulse 87   Resp 20

## 2018-03-14 LAB
ALBUMIN SERPL BCP-MCNC: 1.4 G/DL
ALP SERPL-CCNC: 136 U/L
ALT SERPL W/O P-5'-P-CCNC: 17 U/L
ANION GAP SERPL CALC-SCNC: 10 MMOL/L
AST SERPL-CCNC: 31 U/L
BACTERIA BLD CULT: NORMAL
BASOPHILS # BLD AUTO: 0.01 K/UL
BASOPHILS NFR BLD: 0.2 %
BILIRUB SERPL-MCNC: 0.9 MG/DL
BUN SERPL-MCNC: 38 MG/DL
CALCIUM SERPL-MCNC: 7.5 MG/DL
CHLORIDE SERPL-SCNC: 113 MMOL/L
CO2 SERPL-SCNC: 14 MMOL/L
CREAT SERPL-MCNC: 1.7 MG/DL
DIASTOLIC DYSFUNCTION: YES
DIFFERENTIAL METHOD: ABNORMAL
EOSINOPHIL # BLD AUTO: 0.5 K/UL
EOSINOPHIL NFR BLD: 8.7 %
ERYTHROCYTE [DISTWIDTH] IN BLOOD BY AUTOMATED COUNT: 19.4 %
EST. GFR  (AFRICAN AMERICAN): 41 ML/MIN/1.73 M^2
EST. GFR  (NON AFRICAN AMERICAN): 35 ML/MIN/1.73 M^2
ESTIMATED PA SYSTOLIC PRESSURE: 36.97
GLOBAL PERICARDIAL EFFUSION: ABNORMAL
GLUCOSE SERPL-MCNC: 126 MG/DL
HCT VFR BLD AUTO: 26.5 %
HGB BLD-MCNC: 8.8 G/DL
LYMPHOCYTES # BLD AUTO: 0.7 K/UL
LYMPHOCYTES NFR BLD: 11.5 %
MAGNESIUM SERPL-MCNC: 2 MG/DL
MCH RBC QN AUTO: 28.4 PG
MCHC RBC AUTO-ENTMCNC: 33.2 G/DL
MCV RBC AUTO: 86 FL
MITRAL VALVE MOBILITY: NORMAL
MITRAL VALVE REGURGITATION: ABNORMAL
MONOCYTES # BLD AUTO: 0.4 K/UL
MONOCYTES NFR BLD: 7.1 %
NEUTROPHILS # BLD AUTO: 4.5 K/UL
NEUTROPHILS NFR BLD: 72 %
PHOSPHATE SERPL-MCNC: 2.8 MG/DL
PLATELET # BLD AUTO: 172 K/UL
PMV BLD AUTO: 11.3 FL
POTASSIUM SERPL-SCNC: 3.8 MMOL/L
PROT SERPL-MCNC: 5.2 G/DL
RBC # BLD AUTO: 3.1 M/UL
RETIRED EF AND QEF - SEE NOTES: 40 (ref 55–65)
SODIUM SERPL-SCNC: 137 MMOL/L
TRICUSPID VALVE REGURGITATION: ABNORMAL
VANCOMYCIN SERPL-MCNC: 17.8 UG/ML
WBC # BLD AUTO: 6.24 K/UL

## 2018-03-14 PROCEDURE — 25000003 PHARM REV CODE 250: Performed by: EMERGENCY MEDICINE

## 2018-03-14 PROCEDURE — 93306 TTE W/DOPPLER COMPLETE: CPT | Mod: 26,,, | Performed by: INTERNAL MEDICINE

## 2018-03-14 PROCEDURE — 80202 ASSAY OF VANCOMYCIN: CPT

## 2018-03-14 PROCEDURE — 85025 COMPLETE CBC W/AUTO DIFF WBC: CPT

## 2018-03-14 PROCEDURE — G8998 SWALLOW D/C STATUS: HCPCS | Mod: CK

## 2018-03-14 PROCEDURE — 83735 ASSAY OF MAGNESIUM: CPT

## 2018-03-14 PROCEDURE — G8996 SWALLOW CURRENT STATUS: HCPCS | Mod: CK

## 2018-03-14 PROCEDURE — 92526 ORAL FUNCTION THERAPY: CPT

## 2018-03-14 PROCEDURE — 63600175 PHARM REV CODE 636 W HCPCS: Performed by: HOSPITALIST

## 2018-03-14 PROCEDURE — 11000001 HC ACUTE MED/SURG PRIVATE ROOM

## 2018-03-14 PROCEDURE — 63600175 PHARM REV CODE 636 W HCPCS: Performed by: EMERGENCY MEDICINE

## 2018-03-14 PROCEDURE — B4185 PARENTERAL SOL 10 GM LIPIDS: HCPCS | Performed by: HOSPITALIST

## 2018-03-14 PROCEDURE — 84100 ASSAY OF PHOSPHORUS: CPT

## 2018-03-14 PROCEDURE — 93306 TTE W/DOPPLER COMPLETE: CPT

## 2018-03-14 PROCEDURE — C9113 INJ PANTOPRAZOLE SODIUM, VIA: HCPCS | Performed by: EMERGENCY MEDICINE

## 2018-03-14 PROCEDURE — 80053 COMPREHEN METABOLIC PANEL: CPT

## 2018-03-14 PROCEDURE — G8997 SWALLOW GOAL STATUS: HCPCS | Mod: CH

## 2018-03-14 PROCEDURE — 25000003 PHARM REV CODE 250: Performed by: HOSPITALIST

## 2018-03-14 PROCEDURE — 87040 BLOOD CULTURE FOR BACTERIA: CPT | Mod: 59

## 2018-03-14 PROCEDURE — 36415 COLL VENOUS BLD VENIPUNCTURE: CPT

## 2018-03-14 PROCEDURE — 63600175 PHARM REV CODE 636 W HCPCS: Performed by: INTERNAL MEDICINE

## 2018-03-14 RX ADMIN — HEPARIN SODIUM 5000 UNITS: 5000 INJECTION, SOLUTION INTRAVENOUS; SUBCUTANEOUS at 09:03

## 2018-03-14 RX ADMIN — HEPARIN SODIUM 5000 UNITS: 5000 INJECTION, SOLUTION INTRAVENOUS; SUBCUTANEOUS at 08:03

## 2018-03-14 RX ADMIN — CEFTRIAXONE SODIUM 1 G: 1 INJECTION, POWDER, FOR SOLUTION INTRAMUSCULAR; INTRAVENOUS at 11:03

## 2018-03-14 RX ADMIN — MORPHINE SULFATE 2 MG: 4 INJECTION INTRAVENOUS at 08:03

## 2018-03-14 RX ADMIN — I.V. FAT EMULSION 250 ML: 20 EMULSION INTRAVENOUS at 10:03

## 2018-03-14 RX ADMIN — DEXTROSE 40 MG/HR: 50 INJECTION, SOLUTION INTRAVENOUS at 09:03

## 2018-03-14 RX ADMIN — RETINOL, ERGOCALCIFEROL, .ALPHA.-TOCOPHEROL ACETATE, DL-, PHYTONADIONE, ASCORBIC ACID, NIACINAMIDE, RIBOFLAVIN 5-PHOSPHATE SODIUM, THIAMINE HYDROCHLORIDE, PYRIDOXINE HYDROCHLORIDE, DEXPANTHENOL, BIOTIN, FOLIC ACID, AND CYANOCOBALAMIN: KIT at 10:03

## 2018-03-14 RX ADMIN — VANCOMYCIN HYDROCHLORIDE 1000 MG: 1 INJECTION, POWDER, LYOPHILIZED, FOR SOLUTION INTRAVENOUS at 01:03

## 2018-03-14 RX ADMIN — CASTOR OIL AND BALSAM, PERU: 788; 87 OINTMENT TOPICAL at 09:03

## 2018-03-14 RX ADMIN — CASTOR OIL AND BALSAM, PERU: 788; 87 OINTMENT TOPICAL at 10:03

## 2018-03-14 NOTE — SUBJECTIVE & OBJECTIVE
Interval History: Pt with no acute events. No clinical change.    Review of Systems   Unable to perform ROS: Dementia     Objective:     Vital Signs (Most Recent):  Temp: 97.7 °F (36.5 °C) (03/13/18 1900)  Pulse: 87 (03/13/18 2000)  Resp: (!) 21 (03/13/18 2000)  BP: 130/67 (03/13/18 2000)  SpO2: 100 % (03/13/18 2000) Vital Signs (24h Range):  Temp:  [96.9 °F (36.1 °C)-98.7 °F (37.1 °C)] 97.7 °F (36.5 °C)  Pulse:  [] 87  Resp:  [13-42] 21  SpO2:  [87 %-100 %] 100 %  BP: ()/(53-82) 130/67     Weight: 65 kg (143 lb 4.8 oz)  Body mass index is 23.85 kg/m².    Intake/Output Summary (Last 24 hours) at 03/13/18 2152  Last data filed at 03/13/18 2000   Gross per 24 hour   Intake             3275 ml   Output              725 ml   Net             2550 ml      Physical Exam   Constitutional: No distress.   Cachetic and in fetal position   HENT:   Head: Atraumatic.   +temporal wasting, poor dentation but visualization poor    Eyes:   Conjunctiva appeared normal   Neck: Normal range of motion. Neck supple.   Cardiovascular: Normal rate and regular rhythm.    II/VI murmur   Pulmonary/Chest:   Poor effort, but no difficulty in breathing, slightly course with decrease at bases   Abdominal: Soft. Bowel sounds are normal.   Musculoskeletal: He exhibits edema.   Left arm in sling immobilizer   Neurological:   Awake, not oriented and mostly non-verbal, moves all extremities spontaneously, no focal deficits   Skin: Capillary refill takes 2 to 3 seconds. He is not diaphoretic.   Left heel ulcer, and stage III decubitus buttocks ulcers       Significant Labs: All pertinent labs within the past 24 hours have been reviewed.    Significant Imaging: I have reviewed and interpreted all pertinent imaging results/findings within the past 24 hours.

## 2018-03-14 NOTE — ASSESSMENT & PLAN NOTE
Due to chronic catheter  Treatment as outlined above  Robles catheter not changed out by Urology  Urine culture with E. Coli and Enteroccus  Antibiotics adjusted as discussed above

## 2018-03-14 NOTE — ASSESSMENT & PLAN NOTE
Chronic changes and with noted acute on chronic fracture on left side  Immobilized in shoulder sling  Ortho consulted after patient was stabilized for any recommendations, but unlikely to be a surgical candidate

## 2018-03-14 NOTE — ASSESSMENT & PLAN NOTE
Pt met criteria for sepsis with hypothermia, hypotension, lactic acidosis with complicated UTI as source  S/p IVF challenge with appropriate response in blood pressure with no need for pressors  Continued broad spectrum antibiotics of Zosyn and Vancomycin  Blood and urine cultures with E. Coli and Enterococcus, and blood culture remarkable for Enterococcus and Staph species  Antibiotics changed to Rocephin and Vancomycin bases on sensitivities today  Repeat blood culture from 3/12 with Staph species  Will get ECHO to r/o for endocarditis given persistent bacteremia  Will start with 2D ECHO but may need RALPH  Repeat blood culture again tomorrow to assess for clearing  Clinically stable for transfer to the floor  Prognosis overall poor for any meaningful recovery

## 2018-03-14 NOTE — ASSESSMENT & PLAN NOTE
No bleeding reported  H/H drop reflective of malnutrition and CKD  S/p transfusion of 1 U PRBC, and H/H overall stable post transfusion  Will follow levels

## 2018-03-14 NOTE — ASSESSMENT & PLAN NOTE
Due to advanced dementia with failure to thrive  Expected not to improve given poor PO intake and failed swallow evaluation  Feeding tube is not indicated in this patient and he is not a proper candidate  Temporary PPN for now

## 2018-03-14 NOTE — PROGRESS NOTES
[facility]  Progress Note    Patient Name: Alen Marin  MRN: [id number]  Patient Class: IP- Inpatient   Admission Date: 3/10/2018  Length of Stay: 3 days  Attending Physician: Maria E Jaimes MD  [facility]        Subjective:     Principal Problem:Sepsis    HPI:  89 y/o male with advanced dementia with severe malnutrition/failure to thrive, HTN, HLP, BPH with outlet obstruction with chronic chester catheter (failed voiding trial) who was sent over from [facility] after being found down on the floor. No witness to the events. Pt was in the process of starting hospice at the NH prior to his presentation. In the ER, imaging revealed chronic osseous remodeling of the bilateral glenohumeral joints with suspected superimposed comminuted, displaced fracture on left. Pt became hypotensive to 86/48 and hypothermic (93.8 degrees F) s/p IVF challenge with SBP to low 100s. No elevation in WBC, but Hgb low at 7.0, lactic acid of 2.6 and UA with many WBC, bacteria, +leukocytes. Pt cannot give hx due to advanced dementia.    Hospital Course:  Mr. Marin was admitted with sepsis with UTI and bacteremia. Pt was initially treated with Zosyn and Vancomycin. He has a chronic indwelling chester catheter which was changed out by Urology given difficulty of placement in the past. He also has a humerus fracture that appears acute on chronic and was treated with sling for immobilization. Ortho consulted for recommendations after stabilization but patient unlikely to be a surgical candidate. Pt antibiotics changed to Rocephin and Vancomycin based on sensitivities with urine remarkable for E.coli and Enterococcus faecalis and blood culture remarkable for Enterococcus and Staph species. Repeat blood culture remarkable for persistent Staph species. ECHO ordered to r/o endocarditis. Pt with advanced dementia with failure to thrive and severe malnutrition, and he  has failed speech swallow evaluation. Overall prognosis poor for any meaningful recovery. Pt is hospice appropriate and I have recommended DNR status and hospice, however, son with POA not ready or willing to make patient DNR or hospice. Pt currently on PPN temporarily for nutritional support.    Interval History: Pt with no acute events. No clinical change.    Review of Systems   Unable to perform ROS: Dementia     Objective:     Vital Signs (Most Recent):  Temp: 97.7 °F (36.5 °C) (03/13/18 1900)  Pulse: 87 (03/13/18 2000)  Resp: (!) 21 (03/13/18 2000)  BP: 130/67 (03/13/18 2000)  SpO2: 100 % (03/13/18 2000) Vital Signs (24h Range):  Temp:  [96.9 °F (36.1 °C)-98.7 °F (37.1 °C)] 97.7 °F (36.5 °C)  Pulse:  [] 87  Resp:  [13-42] 21  SpO2:  [87 %-100 %] 100 %  BP: ()/(53-82) 130/67     Weight: 65 kg (143 lb 4.8 oz)  Body mass index is 23.85 kg/m².    Intake/Output Summary (Last 24 hours) at 03/13/18 2152  Last data filed at 03/13/18 2000   Gross per 24 hour   Intake             3275 ml   Output              725 ml   Net             2550 ml      Physical Exam   Constitutional: No distress.   Cachetic and in fetal position   HENT:   Head: Atraumatic.   +temporal wasting, poor dentation but visualization poor    Eyes:   Conjunctiva appeared normal   Neck: Normal range of motion. Neck supple.   Cardiovascular: Normal rate and regular rhythm.    II/VI murmur   Pulmonary/Chest:   Poor effort, but no difficulty in breathing, slightly course with decrease at bases   Abdominal: Soft. Bowel sounds are normal.   Musculoskeletal: He exhibits edema.   Left arm in sling immobilizer   Neurological:   Awake, not oriented and mostly non-verbal, moves all extremities spontaneously, no focal deficits   Skin: Capillary refill takes 2 to 3 seconds. He is not diaphoretic.   Left heel ulcer, and stage III decubitus buttocks ulcers       Significant Labs: All pertinent labs within the past 24 hours have been  reviewed.    Significant Imaging: I have reviewed and interpreted all pertinent imaging results/findings within the past 24 hours.    Assessment/Plan:      * Sepsis    Pt met criteria for sepsis with hypothermia, hypotension, lactic acidosis with complicated UTI as source  S/p IVF challenge with appropriate response in blood pressure with no need for pressors  Continued broad spectrum antibiotics of Zosyn and Vancomycin  Blood and urine cultures with E. Coli and Enterococcus, and blood culture remarkable for Enterococcus and Staph species  Antibiotics changed to Rocephin and Vancomycin bases on sensitivities today  Repeat blood culture from 3/12 with Staph species  Will get ECHO to r/o for endocarditis given persistent bacteremia  Will start with 2D ECHO but may need RALPH  Repeat blood culture again tomorrow to assess for clearing  Clinically stable for transfer to the floor  Prognosis overall poor for any meaningful recovery        Urinary tract infection associated with indwelling urethral catheter    Due to chronic catheter  Treatment as outlined above  Robles catheter not changed out by Urology  Urine culture with E. Coli and Enteroccus  Antibiotics adjusted as discussed above        Goals of care, counseling/discussion    Extensive discussion with son at the bedside about patient's condition and prognosis done on admission  Recommended DNR status, especially since son reported that the patient was going for hospice soon at NH but was waiting to complete his skilled days first  I have explained that DNR status is the best treatment option for the patient and is in line with his planned transition to hospice at the NH prior to his arrival at this facility  He expressed understanding, but he was not ready to make him DNR and was not ready for hospice  He wanted to speak to the rest of his family (mother and sister)  I left message for the daughter to discuss this matter in conference but she was not available  I  readdressed this issue again today via phone (I left messages for him prior but was unable to reach him until now)  I again reiterated that DNR status and hospice is the most appropriate treatment option for this patient  Son, again said the same thing and that he needed to talk with his family which he still did not do  I asked him to please talk with family by tomorrow and told him he will be contacted to readdress this again         Dementia without behavioral disturbance    Advanced and progressive, and will only continue to decline  Extensive discussion done with son on nature of disease course  Pt was in the process of going into hospice  Hospice is the best treatment in this patient  As discussed above        Humerus fracture    Chronic changes and with noted acute on chronic fracture on left side  Immobilized in shoulder sling  Ortho consulted after patient was stabilized for any recommendations, but unlikely to be a surgical candidate        Bacteremia    As discussed above        Unstageable pressure ulcer of left foot    Wound care  Unlikely to improve despite appropriate wound care        BPH with urinary obstruction    Continue chester        H/O traumatic subdural hematoma    Contributed to patient's progressive decline  No acute issues and head CT with chronic changes only        Stage 3 chronic kidney disease    Creatinine better than previous levels  Overall stable, monitor        Urinary retention    Continue chester catheter  Failed voiding trial in the past  Urology performed catheter exchange  Pt was scheduled for TURP as outpatient, but this is unlikely to benefit the patient and he is a high risk candidate        Hyperlipidemia    Will continue statin therapy        Benign essential hypertension    Hold all anti-HTN medications        Severe protein-calorie malnutrition    Due to advanced dementia with failure to thrive  Expected not to improve given poor PO intake and failed swallow  evaluation  Feeding tube is not indicated in this patient and he is not a proper candidate  Temporary PPN for now        Metabolic acidosis    Due to sepsis  Repeat lactic acid level with improvement        Anemia of chronic disease    No bleeding reported  H/H drop reflective of malnutrition and CKD  S/p transfusion of 1 U PRBC, and H/H overall stable post transfusion  Will follow levels           VTE Risk Mitigation         Ordered     heparin (porcine) injection 5,000 Units  Every 12 hours     Route:  Subcutaneous        03/13/18 0315     Medium Risk of VTE  Once      03/10/18 1105     Reason for No Pharmacological VTE Prophylaxis  Once      03/10/18 1105          Critical care time spent on the evaluation and treatment of severe organ dysfunction, review of pertinent labs and imaging studies, discussions with consulting providers and discussions with patient/family: 90 minutes.    Maria E Jaimes MD  Department of Hospital Medicine   [facility]

## 2018-03-14 NOTE — PLAN OF CARE
Problem: Fall Risk (Adult)  Goal: Absence of Falls  Patient will demonstrate the desired outcomes by discharge/transition of care.   Outcome: Outcome(s) achieved Date Met: 03/14/18 03/14/18 0245   Fall Risk (Adult)   Absence of Falls making progress toward outcome   Located close to nursing station with bed alarm on. Safety rounds q2 hours with repositioning.    Problem: Pressure Ulcer Risk (Javan Scale) (Adult,Obstetrics,Pediatric)  Goal: Skin Integrity  Patient will demonstrate the desired outcomes by discharge/transition of care.   Outcome: Ongoing (interventions implemented as appropriate)   03/14/18 0245   Pressure Ulcer Risk (Javan Scale) (Adult,Obstetrics,Pediatric)   Skin Integrity making progress toward outcome   Ulcer to inner buttock cheek, no dsg and multiple areas to ble.    Problem: Infection, Risk/Actual (Adult)  Goal: Infection Prevention/Resolution  Patient will demonstrate the desired outcomes by discharge/transition of care.   Outcome: Ongoing (interventions implemented as appropriate)   03/14/18 0245   Infection, Risk/Actual (Adult)   Infection Prevention/Resolution making progress toward outcome   Receivng iv abx as ordered.    Problem: Patient Care Overview  Goal: Plan of Care Review  Outcome: Ongoing (interventions implemented as appropriate)   03/14/18 0245   Coping/Psychosocial   Plan Of Care Reviewed With patient   Resting quietly, npo as ordered. Receiving tpn/lipids as ordered. Robles catheter to gravity with ronald colored urine, sling/swathe to lue. No noted discomfort except with movement of rue.    Problem: Skin Integrity Impairment, Risk/Actual (Adult)  Goal: Skin Integrity/Wound Healing  Patient will demonstrate the desired outcomes by discharge/transition of care.   Outcome: Ongoing (interventions implemented as appropriate)   03/14/18 0245   Skin Integrity Impairment, Risk/Actual (Adult)   Skin Integrity/Wound Healing making progress toward outcome   Ulcer to inner buttock  cheeks,no dsg, also multiple areas to ble.

## 2018-03-14 NOTE — CARE UPDATE
Transfer Summary:    87 y/o M admitted with sepsis with UTI and bacteremia. Pt was initially treated with Zosyn and Vancomycin. He has a chronic indwelling chester catheter which was changed out by Urology given difficulty of placement in the past. He also has a humerus fracture that appears acute on chronic and was treated with sling for immobilization. Ortho consulted for recommendations after stabilization but patient unlikely to be a surgical candidate. Pt antibiotics changed to Rocephin and Vancomycin based on sensitivities with urine remarkable for E.coli and Enterococcus faecalis and blood culture remarkable for Enterococcus and Staph species. Repeat blood culture remarkable for persistent Staph species. ECHO ordered to r/o endocarditis. Pt with advanced dementia with failure to thrive and severe malnutrition, and he has failed speech swallow evaluation. Overall prognosis poor for any meaningful recovery. Pt is hospice appropriate and I have recommended DNR status and hospice, however, son with POA not ready or willing to make patient DNR. Pt currently on PPN temporarily for nutritional support. F/u on ECHO and culture results. Prognosis poor and needs hospice (son agreeable to hospice but does not want DNR which makes this situation possible and confusion). Workup in progress with no meaningful recovery expected in this patient.    FRANKY Jaimes MD

## 2018-03-14 NOTE — CONSULTS
HISTORY OF PRESENT ILLNESS:  This is a 88-year-old male.  He is admitted due to   a fall at the nursing home.  He was found on the floor.  He came to the   Emergency Room.  He did have qualifications for sepsis.  Also, complaining of   pain in his left shoulder.  I was called from the Emergency Room due to the   patient's overall health status, it was recommended he go home with a sling and   swathe, return to the clinic.  I did not know the patient admitted until   recently when we were consulted.    The patient has multiple medical problems including acute-on-chronic renal   insufficiency, anemia, metabolic acidosis, malnutrition, and dementia.    The patient is in bed.  Left arm is at his side.  He has grimaces with motion.    X-rays show completely displaced fracture of the humeral head of the shaft.  It   has in multiple fragments.    The treatment normally would be prosthetic replacement of the shoulder.  The   patient is not well enough to have this done.  Recommended to continue sling and   swathe for the arm and comfort measures.      RLS/IN  dd: 03/14/2018 09:09:34 (CDT)  td: 03/14/2018 15:26:20 (CDT)  Doc ID   #[id number]  Job ID #[id number]    CC:

## 2018-03-14 NOTE — PLAN OF CARE
Problem: Patient Care Overview  Goal: Plan of Care Review  Outcome: Ongoing (interventions implemented as appropriate)   03/14/18 1889   Coping/Psychosocial   Plan Of Care Reviewed With patient;family   PT is awake and alert; mumbles and slurs his words, but can be understood sometimes; no c/o pain; Line infuses without difficulty receiving TPN continuously; VSS; NAD; being turned Q2 to prevent breakdown.

## 2018-03-14 NOTE — ASSESSMENT & PLAN NOTE
Extensive discussion with son at the bedside about patient's condition and prognosis done on admission  Recommended DNR status, especially since son reported that the patient was going for hospice soon at NH but was waiting to complete his skilled days first  I have explained that DNR status is the best treatment option for the patient and is in line with his planned transition to hospice at the NH prior to his arrival at this facility  He expressed understanding, but he was not ready to make him DNR and was not ready for hospice  He wanted to speak to the rest of his family (mother and sister)  I left message for the daughter to discuss this matter in conference but she was not available  I readdressed this issue again today via phone (I left messages for him prior but was unable to reach him until now)  I again reiterated that DNR status and hospice is the most appropriate treatment option for this patient  Son, again said the same thing and that he needed to talk with his family which he still did not do  I asked him to please talk with family by tomorrow and told him he will be contacted to readdress this again

## 2018-03-14 NOTE — ASSESSMENT & PLAN NOTE
Continue chester catheter  Failed voiding trial in the past  Urology performed catheter exchange  Pt was scheduled for TURP as outpatient, but this is unlikely to benefit the patient and he is a high risk candidate

## 2018-03-14 NOTE — ASSESSMENT & PLAN NOTE
Advanced and progressive, and will only continue to decline  Extensive discussion done with son on nature of disease course  Pt was in the process of going into hospice  Hospice is the best treatment in this patient  As discussed above

## 2018-03-15 VITALS
DIASTOLIC BLOOD PRESSURE: 68 MMHG | HEIGHT: 65 IN | HEART RATE: 82 BPM | SYSTOLIC BLOOD PRESSURE: 108 MMHG | WEIGHT: 143.31 LBS | TEMPERATURE: 97 F | OXYGEN SATURATION: 98 % | BODY MASS INDEX: 23.88 KG/M2 | RESPIRATION RATE: 18 BRPM

## 2018-03-15 PROBLEM — A41.9 SEPSIS: Status: RESOLVED | Noted: 2018-03-10 | Resolved: 2018-03-15

## 2018-03-15 LAB
ALBUMIN SERPL BCP-MCNC: 1.3 G/DL
ALP SERPL-CCNC: 135 U/L
ALT SERPL W/O P-5'-P-CCNC: 16 U/L
ANION GAP SERPL CALC-SCNC: 8 MMOL/L
AST SERPL-CCNC: 26 U/L
BACTERIA BLD CULT: NORMAL
BASOPHILS # BLD AUTO: 0 K/UL
BASOPHILS NFR BLD: 0 %
BILIRUB SERPL-MCNC: 0.6 MG/DL
BUN SERPL-MCNC: 41 MG/DL
CALCIUM SERPL-MCNC: 7.5 MG/DL
CHLORIDE SERPL-SCNC: 111 MMOL/L
CO2 SERPL-SCNC: 18 MMOL/L
CREAT SERPL-MCNC: 1.5 MG/DL
DIFFERENTIAL METHOD: ABNORMAL
EOSINOPHIL # BLD AUTO: 0.6 K/UL
EOSINOPHIL NFR BLD: 11 %
ERYTHROCYTE [DISTWIDTH] IN BLOOD BY AUTOMATED COUNT: 18.8 %
EST. GFR  (AFRICAN AMERICAN): 47 ML/MIN/1.73 M^2
EST. GFR  (NON AFRICAN AMERICAN): 41 ML/MIN/1.73 M^2
GLUCOSE SERPL-MCNC: 77 MG/DL
HCT VFR BLD AUTO: 24.4 %
HGB BLD-MCNC: 8.1 G/DL
LYMPHOCYTES # BLD AUTO: 0.6 K/UL
LYMPHOCYTES NFR BLD: 11.6 %
MAGNESIUM SERPL-MCNC: 1.7 MG/DL
MCH RBC QN AUTO: 28.4 PG
MCHC RBC AUTO-ENTMCNC: 33.2 G/DL
MCV RBC AUTO: 86 FL
MONOCYTES # BLD AUTO: 0.4 K/UL
MONOCYTES NFR BLD: 8.2 %
NEUTROPHILS # BLD AUTO: 3.7 K/UL
NEUTROPHILS NFR BLD: 68.8 %
PHOSPHATE SERPL-MCNC: 3.5 MG/DL
PLATELET # BLD AUTO: 59 K/UL
PMV BLD AUTO: 10.4 FL
POTASSIUM SERPL-SCNC: 3.6 MMOL/L
PROT SERPL-MCNC: 4.7 G/DL
RBC # BLD AUTO: 2.85 M/UL
SODIUM SERPL-SCNC: 137 MMOL/L
VANCOMYCIN SERPL-MCNC: 22.2 UG/ML
WBC # BLD AUTO: 5.35 K/UL

## 2018-03-15 PROCEDURE — C9113 INJ PANTOPRAZOLE SODIUM, VIA: HCPCS | Performed by: EMERGENCY MEDICINE

## 2018-03-15 PROCEDURE — 25000003 PHARM REV CODE 250: Performed by: EMERGENCY MEDICINE

## 2018-03-15 PROCEDURE — 80202 ASSAY OF VANCOMYCIN: CPT

## 2018-03-15 PROCEDURE — 94761 N-INVAS EAR/PLS OXIMETRY MLT: CPT

## 2018-03-15 PROCEDURE — 80053 COMPREHEN METABOLIC PANEL: CPT

## 2018-03-15 PROCEDURE — 63600175 PHARM REV CODE 636 W HCPCS: Performed by: INTERNAL MEDICINE

## 2018-03-15 PROCEDURE — 25000003 PHARM REV CODE 250: Performed by: HOSPITALIST

## 2018-03-15 PROCEDURE — 83735 ASSAY OF MAGNESIUM: CPT

## 2018-03-15 PROCEDURE — 36415 COLL VENOUS BLD VENIPUNCTURE: CPT

## 2018-03-15 PROCEDURE — 63600175 PHARM REV CODE 636 W HCPCS: Performed by: EMERGENCY MEDICINE

## 2018-03-15 PROCEDURE — 84100 ASSAY OF PHOSPHORUS: CPT

## 2018-03-15 PROCEDURE — 85025 COMPLETE CBC W/AUTO DIFF WBC: CPT

## 2018-03-15 RX ORDER — SCOLOPAMINE TRANSDERMAL SYSTEM 1 MG/1
1 PATCH, EXTENDED RELEASE TRANSDERMAL
Status: DISCONTINUED | OUTPATIENT
Start: 2018-03-15 | End: 2018-03-15 | Stop reason: HOSPADM

## 2018-03-15 RX ORDER — SCOLOPAMINE TRANSDERMAL SYSTEM 1 MG/1
1 PATCH, EXTENDED RELEASE TRANSDERMAL
Qty: 24 PATCH | Refills: 0 | Status: SHIPPED | OUTPATIENT
Start: 2018-03-15

## 2018-03-15 RX ORDER — LORAZEPAM 2 MG/ML
1 CONCENTRATE ORAL
Qty: 30 ML | Refills: 0 | Status: SHIPPED | OUTPATIENT
Start: 2018-03-15 | End: 2018-04-14

## 2018-03-15 RX ORDER — MORPHINE SULFATE ORAL SOLUTION 10 MG/5ML
5 SOLUTION ORAL
Qty: 15 ML | Refills: 0 | Status: SHIPPED | OUTPATIENT
Start: 2018-03-15

## 2018-03-15 RX ORDER — FUROSEMIDE 20 MG/1
20 TABLET ORAL DAILY PRN
Qty: 30 TABLET | Refills: 0
Start: 2018-03-15

## 2018-03-15 RX ADMIN — DEXTROSE 40 MG: 50 INJECTION, SOLUTION INTRAVENOUS at 08:03

## 2018-03-15 RX ADMIN — HEPARIN SODIUM 5000 UNITS: 5000 INJECTION, SOLUTION INTRAVENOUS; SUBCUTANEOUS at 08:03

## 2018-03-15 RX ADMIN — CASTOR OIL AND BALSAM, PERU: 788; 87 OINTMENT TOPICAL at 08:03

## 2018-03-15 RX ADMIN — SCOLOPAMINE TRANSDERMAL SYSTEM 1 PATCH: 1 PATCH, EXTENDED RELEASE TRANSDERMAL at 03:03

## 2018-03-15 NOTE — PT/OT/SLP PROGRESS
Speech Language Pathology Treatment    Patient Name:  Alen Marin   MRN:  [id number]  Admitting Diagnosis: Sepsis    Recommendations:                 General Recommendations:  Dysphagia therapy  Diet recommendations:  Puree, Liquid Diet Level: Thin   Aspiration Precautions: 1 bite/sip at a time and Alternating bites/sips   General Precautions: Standard, aspiration  Communication strategies:  none    Subjective     Patient seen at bedside; eyes closed but talking and responding appropriately   Patient goals: to drink juice     Pain/Comfort:  · Pain Rating 1: 0/10  · Pain Rating Post-Intervention 1: 0/10  · Pain Rating 2: 0/10  · Pain Rating Post-Intervention 2: 0/10    Objective:     Has the patient been evaluated by SLP for swallowing?   Yes  Keep patient NPO? No   Current Respiratory Status: room air      Patient re-evaluation at bedside today; Patient tolerated trials of thin liquids via ice chips x 1, teaspoon x 2, cup sips x 2, and straw sips x 5 no overt s/s of aspiration. Patient presented with improved alertness and attention to bolus today. Patient tolerated puree trials x 5 no overt s/s of aspiration. Decreased oral occlusion around utensil observed. ST RECS: Puree/Thin. Dysphagia tx 3x weekly.     Assessment:     Alen Marin is a 88 y.o. male with an SLP diagnosis of Dysphagia.  He presents with Moderate s/s of oropharyngeal dysphagia c/b generalized oral motor weakness bilaterally. Patient demonstrated adequate laryngeal elevation, and no overt s/s of aspiration during PO trials. Patient demonstrated poor oral occlusion/acceptance of Puree bolus via teaspoon. Required verbal cuing to open mouth for puree trials, with decreased oral coordination. Patient positioning for PO intake in bed was poor due to kyphotic posture. ST RECS: puree/thin communicated to nursing.     Goals:    SLP Goals        Problem: SLP Goal    Goal Priority Disciplines Outcome   SLP Goal    Medium SLP Ongoing (interventions  implemented as appropriate)   Description:  STGs  1. Pt will tolerate puree/thin no overt s/s of aspiration.  2. Patient will complete ongoing BSS.                     Plan:     · Patient to be seen:  3 x/week   · Plan of Care expires:  03/23/18  · Plan of Care reviewed with:  patient, caregiver   · SLP Follow-Up:  Yes       Discharge recommendations:      Barriers to Discharge:  None    Time Tracking:     SLP Treatment Date:   03/14/18  Speech Start Time:  1830  Speech Stop Time:  1900     Speech Total Time (min):  30 min    Billable Minutes: Re-eval 30    Iris Diane CCC-SLP  03/14/2018

## 2018-03-15 NOTE — PROGRESS NOTES
SHAHNAZ met with pt daughter, Jany, and son, Miguelito to discuss pt becoming a DNR. Miguelito and Jany are agreeable, however, Miguelito informed SW he would need to speak to his demented mother to inform her on the plan since she and the pt has been  for 65 years. Miguelito did inform SW, he has signed paperwork for [facility], and Regina ([facility]) verified. SHAHNAZ will continue to follow. Pt expected to discharge to [facility] with [facility]. Dr. Pettit, notified of DNR status.

## 2018-03-15 NOTE — PLAN OF CARE
03/15/18 1145   Medicare Message   Important Message from Medicare regarding Discharge Appeal Rights Given to patient/caregiver  (attempted Son Miguelito Marin [phone] no answer, IMM mailed to [place] [facility] article# )   Date IMM was signed 03/15/18   Time IMM was signed 0182

## 2018-03-15 NOTE — PLAN OF CARE
Problem: Patient Care Overview  Goal: Plan of Care Review  Outcome: Outcome(s) achieved Date Met: 03/15/18   03/15/18 6915   Coping/Psychosocial   Plan Of Care Reviewed With patient   Pt resting quietly; no c/o pain; confused; waiting for EMS to transport out; NAD

## 2018-03-15 NOTE — PLAN OF CARE
Problem: Fall Risk (Adult)  Goal: Identify Related Risk Factors and Signs and Symptoms  Related risk factors and signs and symptoms are identified upon initiation of Human Response Clinical Practice Guideline (CPG)   Outcome: Ongoing (interventions implemented as appropriate)   03/15/18 0336   Fall Risk   Related Risk Factors (Fall Risk) confusion/agitation   Located close to nursing station with bed alarm on.     Problem: Infection, Risk/Actual (Adult)  Goal: Infection Prevention/Resolution  Patient will demonstrate the desired outcomes by discharge/transition of care.   Outcome: Ongoing (interventions implemented as appropriate)   03/15/18 0336   Infection, Risk/Actual (Adult)   Infection Prevention/Resolution making progress toward outcome   Receiving iv abx as ordered.     Problem: Patient Care Overview  Goal: Plan of Care Review  Outcome: Ongoing (interventions implemented as appropriate)   03/15/18 0336   Coping/Psychosocial   Plan Of Care Reviewed With patient   Resting quietly during night, continues with tpn/lipids as ordered. Medicated for pain x1 last pm, left arm with sling/swathe, right ue swelling decreased. Alert to self, answering some questions appropriately. Robles to gravity with yellow colored urine, repositioned as needed.    Problem: Skin Integrity Impairment, Risk/Actual (Adult)  Goal: Skin Integrity/Wound Healing  Patient will demonstrate the desired outcomes by discharge/transition of care.   Outcome: Ongoing (interventions implemented as appropriate)   03/15/18 0336   Skin Integrity Impairment, Risk/Actual (Adult)   Skin Integrity/Wound Healing making progress toward outcome   Applied ointment as ordered. Multiple open area to le,open to air.

## 2018-03-15 NOTE — ASSESSMENT & PLAN NOTE
Advanced and progressive, and will only continue to decline  Extensive discussion done with son on nature of disease course  Pt was in the process of going into hospice  Hospice is the best treatment in this patient  As discussed above    DNR and proceed with hospice, inpatient appropriate

## 2018-03-15 NOTE — PROGRESS NOTES
[facility] [place], [place] Medicine  Progress Note    Patient Name: Alen Marin  MRN: [id number]  Patient Class: IP- Inpatient   Admission Date: 3/10/2018  Length of Stay: 5 days  Attending Physician: Soheila Pettit MD  Primary Care Provider: [facility] Medical [facility]        Subjective:     Principal Problem:Sepsis    HPI:  89 y/o male with advanced dementia with severe malnutrition/failure to thrive, HTN, HLP, BPH with outlet obstruction with chronic chester catheter (failed voiding trial) who was sent over from [facility] after being found down on the floor. No witness to the events. Pt was in the process of starting hospice at the NH prior to his presentation. In the ER, imaging revealed chronic osseous remodeling of the bilateral glenohumeral joints with suspected superimposed comminuted, displaced fracture on left. Pt became hypotensive to 86/48 and hypothermic (93.8 degrees F) s/p IVF challenge with SBP to low 100s. No elevation in WBC, but Hgb low at 7.0, lactic acid of 2.6 and UA with many WBC, bacteria, +leukocytes. Pt cannot give hx due to advanced dementia.    Hospital Course:  Mr. Marin was admitted with sepsis with UTI and bacteremia. Pt was initially treated with Zosyn and Vancomycin. He has a chronic indwelling chester catheter which was changed out by Urology given difficulty of placement in the past. He also has a humerus fracture that appears acute on chronic and was treated with sling for immobilization. Ortho consulted for recommendations after stabilization but patient unlikely to be a surgical candidate. Pt antibiotics changed to Rocephin and Vancomycin based on sensitivities with urine remarkable for E.coli and Enterococcus faecalis and blood culture remarkable for Enterococcus and Staph species. Repeat blood culture remarkable for persistent Staph species. ECHO ordered to r/o endocarditis. Pt with advanced dementia with failure to thrive and severe malnutrition, and  he has failed speech swallow evaluation. Overall prognosis poor for any meaningful recovery. Pt is hospice appropriate and I have recommended DNR status and hospice, however, son with POA not ready or willing to make patient DNR or hospice. Pt currently on PPN temporarily for nutritional support.    3/14- SW assisted with DNR discussion and he is in agreement with DNR after watching Wil video; hope to proceed with hospice in am     Interval History: pt cannot give history, no acute events     Review of Systems   Unable to perform ROS: Dementia     Objective:     Vital Signs (Most Recent):  Temp: 97.8 °F (36.6 °C) (03/14/18 2358)  Pulse: 75 (03/14/18 2358)  Resp: (!) 22 (03/14/18 2358)  BP: 121/77 (03/14/18 2358)  SpO2: 98 % (03/14/18 2358) Vital Signs (24h Range):  Temp:  [97.8 °F (36.6 °C)-98.9 °F (37.2 °C)] 97.8 °F (36.6 °C)  Pulse:  [75-84] 75  Resp:  [18-22] 22  SpO2:  [95 %-100 %] 98 %  BP: (110-168)/() 121/77     Weight: 65 kg (143 lb 4.8 oz)  Body mass index is 23.85 kg/m².    Intake/Output Summary (Last 24 hours) at 03/15/18 0648  Last data filed at 03/15/18 0600   Gross per 24 hour   Intake          1498.45 ml   Output             1300 ml   Net           198.45 ml      Physical Exam   Constitutional: No distress.   Cachetic and in fetal position   HENT:   Head: Atraumatic.   +temporal wasting, poor dentation but visualization poor    Eyes:   Conjunctiva appeared normal   Neck: Normal range of motion. Neck supple.   Cardiovascular: Normal rate and regular rhythm.    II/VI murmur   Pulmonary/Chest:   Poor effort, but no difficulty in breathing, slightly course with decrease at bases   Abdominal: Soft. Bowel sounds are normal.   Musculoskeletal: He exhibits edema.   Left arm in sling immobilizer   Neurological:   Awake, not oriented and mostly non-verbal, moves all extremities spontaneously, no focal deficits   Skin: Capillary refill takes 2 to 3 seconds. He is not diaphoretic.   Left heel ulcer, and  stage III decubitus buttocks ulcers       Significant Labs: All pertinent labs within the past 24 hours have been reviewed.    Significant Imaging: I have reviewed all pertinent imaging results/findings within the past 24 hours.    Assessment/Plan:      * Sepsis    Pt met criteria for sepsis with hypothermia, hypotension, lactic acidosis with complicated UTI as source  S/p IVF challenge with appropriate response in blood pressure with no need for pressors  Continued broad spectrum antibiotics of Zosyn and Vancomycin  Blood and urine cultures with E. Coli and Enterococcus, and blood culture remarkable for Enterococcus and Staph species  Antibiotics changed to Rocephin and Vancomycin bases on sensitivities today  Repeat blood culture from 3/12 with Staph species  Will get ECHO to r/o for endocarditis given persistent bacteremia  Will start with 2D ECHO but may need RALPH  Repeat blood culture again tomorrow to assess for clearing  Clinically stable for transfer to the floor  Prognosis overall poor for any meaningful recovery        Bacteremia    As discussed above          Unstageable pressure ulcer of left foot    Wound care  Unlikely to improve despite appropriate wound care        BPH with urinary obstruction    Continue chester        Humerus fracture    Chronic changes and with noted acute on chronic fracture on left side  Immobilized in shoulder sling  Ortho consulted after patient was stabilized for any recommendations, but unlikely to be a surgical candidate        H/O traumatic subdural hematoma    Contributed to patient's progressive decline  No acute issues and head CT with chronic changes only        Urinary tract infection associated with indwelling urethral catheter    Due to chronic catheter  Treatment as outlined above  Chester catheter not changed out by Urology  Urine culture with E. Coli and Enteroccus  Antibiotics adjusted as discussed above        Chronic kidney disease              Stage 3 chronic  kidney disease    Creatinine better than previous levels  Overall stable, monitor        Goals of care, counseling/discussion    Extensive discussion with son at the bedside about patient's condition and prognosis done on admission  Recommended DNR status, especially since son reported that the patient was going for hospice soon at NH but was waiting to complete his skilled days first  I have explained that DNR status is the best treatment option for the patient and is in line with his planned transition to hospice at the NH prior to his arrival at this facility  He expressed understanding, but he was not ready to make him DNR and was not ready for hospice  He wanted to speak to the rest of his family (mother and sister)  I left message for the daughter to discuss this matter in conference but she was not available  I readdressed this issue again today via phone (I left messages for him prior but was unable to reach him until now)  I again reiterated that DNR status and hospice is the most appropriate treatment option for this patient  Son, again said the same thing and that he needed to talk with his family which he still did not do  I asked him to please talk with family by tomorrow and told him he will be contacted to readdress this again         Urinary retention    Continue chester catheter  Failed voiding trial in the past  Urology performed catheter exchange  Pt was scheduled for TURP as outpatient, but this is unlikely to benefit the patient and he is a high risk candidate        Hyperlipidemia    Will continue statin therapy        Benign essential hypertension    Hold all anti-HTN medications        Dementia without behavioral disturbance    Advanced and progressive, and will only continue to decline  Extensive discussion done with son on nature of disease course  Pt was in the process of going into hospice  Hospice is the best treatment in this patient  As discussed above    DNR and proceed with hospice,  inpatient appropriate         Severe protein-calorie malnutrition    Due to advanced dementia with failure to thrive  Expected not to improve given poor PO intake and failed swallow evaluation  Feeding tube is not indicated in this patient and he is not a proper candidate  Temporary PPN for now        Metabolic acidosis    Due to sepsis  Repeat lactic acid level with improvement        Anemia of chronic disease    No bleeding reported  H/H drop reflective of malnutrition and CKD  S/p transfusion of 1 U PRBC, and H/H overall stable post transfusion  Will follow levels           VTE Risk Mitigation         Ordered     heparin (porcine) injection 5,000 Units  Every 12 hours     Route:  Subcutaneous        03/13/18 0315     Medium Risk of VTE  Once      03/10/18 1105     Reason for No Pharmacological VTE Prophylaxis  Once      03/10/18 1105              Soheila Pettit MD  Department of Hospital Medicine   [facility]

## 2018-03-15 NOTE — PROGRESS NOTES
"   [facility] [place]  Adult Nutrition  Progress Note    SUMMARY       Recommendations    Recommendation/Intervention: 1. Safe diet advancement per SLP 2. If TF warranted rec: Isosource 1.5 initiated @ 10 ml/hr and advanced 10 ml q 6 hrs to goal rate of 45 ml/hr + 2 packets beneprotein/day. Fluid flushes for normal fluid intake: 140 ml q 4 hrs or per MD. TF to provide: 1670 kcal, 85g pro, 825 ml fluid. Hold TF x 4 hrs for residuals >250 ml; consider prokinetics; or for sx/o n/v/abd discomfort; HOB >30. 2. RD to montior  Goals: Safety to po diet; Initiate nutrition within 72 hrs  Nutrition Goal Status: progressing towards goal  Communication of RD Recs: reviewed with RN    Reason for Assessment    Reason for Assessment: RD follow-up  Diagnosis:  (Sepsis)  Relevant Medical History: HTN, HLD, FTT, malnutrition  Interdisciplinary Rounds: attended  General Information Comments: SLP evaluated, recommends pureed with thin liquids. PN weaning down. Poor oral intake. + multiple pressure ulcers. Wound care following. Patient to discharge to hospice today.  Nutrition Discharge Planning: Patient to discharge on a pureed diet (texture per Speech Therapy) with ONS TID to aid in wound healing.     Nutrition Risk Screen    Nutrition Risk Screen: large or nonhealing wound, burn or pressure ulcer    Nutrition/Diet History    Food Preferences: No cultural, Yarsanism or ethnic food preferences.   Do you have any cultural, spiritual, Yarsanism conflicts, given your current situation?: unable to report   Factors Affecting Nutritional Intake: decreased appetite, difficulty/impaired swallowing, inability to feed self    Anthropometrics    Temp: 98.5 °F (36.9 °C)  Height Method: Estimated  Height: 5' 5" (165.1 cm)  Height (inches): 65 in  Weight Method: Bed Scale  Weight: 65 kg (143 lb 4.8 oz)  Weight (lb): 143.3 lb  Ideal Body Weight (IBW), Male: 136 lb  % Ideal Body Weight, Male (lb): 105.37 lb  BMI (Calculated): 23.9  BMI " "Grade: 18.5-24.9 - normal      Lab/Procedures/Meds    Pertinent Labs Reviewed: reviewed  Pertinent Labs Comments: Alb 1.3  Pertinent Medications Reviewed: reviewed    Physical Findings/Assessment    Overall Physical Appearance: advanced age, edematous, loss of muscle mass, on oxygen therapy (moderate 3+ edema)  Oral/Mouth Cavity: tooth/teeth missing  Skin: pressure ulcer(s) (Stage II (2), Stage D, Stage U)    Estimated/Assessed Needs    Weight Used For Calorie Calculations: 62.9 kg (138 lb 10.7 oz)  Height: 5' 5" (165.1 cm)  Energy Calorie Requirements (kcal): 1330-4929 kcal  Energy Need Method: [place]-St Jeor  RMR ([place]-St. Jeor Equation): 1225.88  Protein Requirements: 75-95g  Weight Used For Protein Calculations: 62.9 kg (138 lb 10.7 oz)  Fluid Need Method: RDA Method  RDA Method (mL): 1550  CHO Requirement: n/a       Nutrition Prescription Ordered    Current Diet Order: Pureed, thin liquids  Current Nutrition Support Formula Ordered: Clinimix 4.25/10  Current Nutrition Support Rate Ordered: 75 (ml)  Current Nutrition Support Frequency Ordered: ml/hr x 24 hours    Evaluation of Received Nutrient/Fluid Intake    Parenteral Calories (kcal): 918  Parenteral Protein (gm): 76.5  Parenteral Fluid (mL): 1800  GIR (Glucose Infusion Rate) (mg/kg/min): 1.9 mg/kg/min  % Kcal Needs: 63  % Protein Needs: 100  IV Fluid (mL): 1800  I/O: 1498/1800  Energy Calories Required: not meeting needs  Protein Required: meeting needs  Fluid Required: meeting needs  Comments: LBM: 03/10  Tolerance: other (see comments) (yulissa - consumed 0%)  % Intake of Estimated Energy Needs: 0 - 25 %  % Meal Intake: 0 - 25 %    Nutrition Risk    Level of Risk/Frequency of Follow-up:  (f/u 2x weekly)     Assessment and Plan    Assessment and Plan     Nutrition Dx; Inadequate Energy Intake r/t AMS as evidenced by SLP rec NPO  Nutrition Dx Status: continues/improving (diet advanced however consuming 0% of meals)       Monitor and Evaluation    Food and " Nutrient Intake: energy intake, food and beverage intake, enteral nutrition intake  Food and Nutrient Adminstration: diet order, enteral and parenteral nutrition administration  Knowledge/Beliefs/Attitudes: food and nutrition knowledge/skill  Physical Activity and Function: nutrition-related ADLs and IADLs  Anthropometric Measurements: weight, weight change  Biochemical Data, Medical Tests and Procedures: electrolyte and renal panel, glucose/endocrine profile  Nutrition-Focused Physical Findings: overall appearance, skin     Nutrition Follow-Up    RD Follow-up?: Yes

## 2018-03-15 NOTE — PLAN OF CARE
03/15/18 1138   Final Note   Assessment Type Final Discharge Note   Discharge Disposition (Pt returning [facility] )   What phone number can be called within the next 1-3 days to see how you are doing after discharge? [phone]     Orders received to transport back to [facility] with [facility]. SHAHNAZ faxed face sheet, NH orders, discharge summary, labs, and MAR to [facility] via [facility]. SHAHNAZ contacted Nishant ([place] [facility]) and informed orders has been sent. Nishant provided SW call report information immediately. Per Nishant, pt will transfer back to room 20B, and the nurse can call report to [phone]. Nishant, informed SW, to wait 30 minutes before the nurse call report. Nishant confirmed [facility] uses [facility], and per Nishant, they will cover stretcher  For pt to travel back to NH.     11:19AM- SHAHNAZ contacted pt son Miguelito, @ [phone] to confirm he has not changed his mind concerning DMR. Miguelito did not answer the phone, and SHAHNAZ left a detailed message. SHAHNAZ contacted pt daughter Jany, @ [phone] to confirm, she and her brother are okay with DNR status. According to Jany, they are still in agreeable to DNR, and she will come and sign LaPost. Dr. Pettit notified.     11:24AM- SHAHNAZ contacted Rgeina ([facility]) and informed orders are in for hospice. SHAHNAZ will notify [facility] once SW has call report information and transportation has been arranged.     11:58AM- SHAHNAZ contacted [facility] @ [phone] to arrange transportation. SHAHNAZ spoke with Zaira, and according to Zaira, ETA is 3:00PM. SHAHNAZ provided pt nurse, pt travel packet and call report information. SHAHNAZ informed nurse of [facility] ETA.    SHAHNAZ contacted [facility] and placed call in well-call due to waiting on La[place] to completed by physician and pt family. SHAHNAZ contacted [facility] @ 3:00PM to take transport out of well-call and was informed someone from [facility] wont be here until 6 or later. SHAHNAZ contacted Rachael (manager), and Rachael, contacted Cece ([facility] supervisor), and per Cece, someone from  [facility] should be here by 6. Per Cece, if someone is not here by 6:00PM to please call her and she will work on getting someone here. SHAHNAZ spoke with pt nurse, Allie, and informed of [facility] ETA.  SHAHNAZ informed VETO Kelley if [facility] is not here by 6:30PM to please call to determine the holdup.

## 2018-03-15 NOTE — PLAN OF CARE
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, N13.8]  Yes    Urinary tract infection associated with indwelling urethral catheter [T83.511A, N39.0]  Yes    H/O traumatic subdural hematoma [Z87.828]  Not Applicable    Humerus fracture [S42.309A]  Yes    Stage 3 chronic kidney disease [N18.3]  Yes    Goals of care, counseling/discussion [Z71.89]  Not Applicable    Urinary retention [R33.9]  Yes    Dementia without behavioral disturbance [F03.90]  Yes     Chronic    Benign essential hypertension [I10]  Yes     Chronic    Hyperlipidemia [E78.5]  Yes     Chronic    Metabolic acidosis [E87.2]  Yes    Severe protein-calorie malnutrition [E43]  Yes    Anemia of chronic disease [D63.8]  Yes      Resolved Hospital Problems    Diagnosis Date Resolved POA    *Sepsis [A41.9] 03/15/2018 Yes       Patient is homebound due to:  Sepsis    Allergies:Review of patient's allergies indicates:  No Known Allergies    Vitals: per hospice  DNR   2 liters NC oxygen and titrate for comfort     Diet: puree with thin liquids as tolerated/pleasure      Acitivities:  Bed rest  -Left arm displaced humeral fracture;continue sling and swathe for the arm and comfort measures    LABS: none    Nursing Precautions:     - Aspiration precautions:             - Total assistance with meals            -  Upright 90 degrees befor during and after meals             -  Suction at bedside          - Fall precautions per nursing home protocol     - Decubitus precautions:        -  for  positioning   - Pressure reducing foam mattress   - Turn patient every two hours. Use wedge pillows to anchor patient      MISCELLANEOUS CARE:       Robles Care: Empty Robles bag every shift.  Change Robles every month     Routine Skin for Bedridden Patients:  Apply moisture barrier cream to all    skin folds and wet areas in perineal area daily and after baths and                           all bowel movements.    Local wound care to left medial foot pressure injuries/medial knee pressure injuries every 3 days and prn- Cleanse with NS and dress ulcers with Aquacel/Mepilex foam dressing. Continue Pressure Injury Prevention Interventions with frequent pressure shifts and Sundance boots.      Medications: Discontinue all previous medication orders, if any. See new list below.     Alen Marin   Home Medication Instructions MONY:[id number]    Printed on:03/15/18 1021   Medication Information                      acetaminophen (TYLENOL) 325 MG tablet  Take 2 tablets (650 mg total) by mouth every 6 (six) hours as needed for Pain or Temperature greater than (or equal to 101 degree F).             balsam peru-castor oil  mg/gram Oint  Apply topically 2 (two) times daily.             furosemide (LASIX) 20 MG tablet  Take 1 tablet (20 mg total) by mouth daily as needed.             lorazepam 2 mg/ml oral conc (ATIVAN) 2 mg/mL Conc  Take 0.5 mLs (1 mg total) by mouth every 3 (three) hours as needed.             morphine 10 mg/5 mL solution  Take 2.5 mLs (5 mg total) by mouth every 2 (two) hours as needed for Pain.      Scopolamine patch 1.3-1.5, apply one patch every 3 days                     _________________________________  Soheila Pettit MD  03/15/2018

## 2018-03-15 NOTE — DISCHARGE SUMMARY
[facility] Hospital Medicine  Discharge Summary      Patient Name: Alen Marin  MRN: [id number]  Admission Date: 3/10/2018  Hospital Length of Stay: 5 days  Discharge Date and Time:  03/15/2018 10:31 AM  Attending Physician: Soheila Pettit MD   Discharging Provider: Soheila Pettit MD  Primary Care Provider: [facility] Medical Records      HPI:   87 y/o male with advanced dementia with severe malnutrition/failure to thrive, HTN, HLP, BPH with outlet obstruction with chronic chester catheter (failed voiding trial) who was sent over from [facility] after being found down on the floor. No witness to the events. Pt was in the process of starting hospice at the NH prior to his presentation. In the ER, imaging revealed chronic osseous remodeling of the bilateral glenohumeral joints with suspected superimposed comminuted, displaced fracture on left. Pt became hypotensive to 86/48 and hypothermic (93.8 degrees F) s/p IVF challenge with SBP to low 100s. No elevation in WBC, but Hgb low at 7.0, lactic acid of 2.6 and UA with many WBC, bacteria, +leukocytes. Pt cannot give hx due to advanced dementia.    * No surgery found *      Hospital Course:   Mr. Marin was admitted with sepsis with UTI and bacteremia. Pt was initially treated with Zosyn and Vancomycin. He has a chronic indwelling chester catheter which was changed out by Urology given difficulty of placement in the past. He also has a humerus fracture that appears acute on chronic and was treated with sling for immobilization. Ortho consulted for recommendations after stabilization but patient unlikely to be a surgical candidate. Pt antibiotics changed to Rocephin and Vancomycin based on sensitivities with urine remarkable for E.coli and Enterococcus faecalis and blood culture remarkable for Enterococcus and Staph species. Repeat blood culture remarkable for persistent Staph species. ECHO ordered to r/o endocarditis. Pt with advanced  dementia with failure to thrive and severe malnutrition, and he has failed speech swallow evaluation. Overall prognosis poor for any meaningful recovery. Pt is hospice appropriate and I have recommended DNR status and hospice, however, son with POA not ready or willing to make patient DNR or hospice. Pt currently on PPN temporarily for nutritional support.    3/14- SW assisted with DNR discussion and he is in agreement with DNR after watching Wil video; hope to proceed with hospice in am  3/15- pt seen by  yesterday with plan to continue puree diet/thin liquids as tolerated and for pleasure; Continue sling to left arm, wound care orders given to NH/hospice, discontinue IV antibiotics and TPN, code status updated to DNR, lapost to be signed by healthcare representative and ok to dc to [facility] with [facility]; continue chester for urinary retention and comfort       Consults:   Consults         Status Ordering Provider     Inpatient consult to Orthopedic Surgery  Once     Provider:  Gee Berkowitz III, MD    Completed PANCHO SHEFFIELD     Inpatient consult to Urology  Once     Provider:  MARCY Price MD    Completed PANCHO SHEFFIELD          No new Assessment & Plan notes have been filed under this hospital service since the last note was generated.  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5] 01/30/2018 Yes     Chronic    Metabolic acidosis [E87.2] 01/30/2018 Yes    Severe protein-calorie malnutrition [E43] 01/30/2018 Yes    Anemia of chronic disease [D63.8] 01/30/2018 Yes      Problems Resolved During this Admission:    Diagnosis Date Noted Date Resolved POA    PRINCIPAL PROBLEM:  Sepsis [A41.9] 03/10/2018 03/15/2018 Yes       Discharged Condition: fair    Disposition: Hospice/Home    Follow Up:  Follow-up Information     [facility] [place]    Specialty:  Hospice Services  Contact information:  [facility] [facility] [facility]  [address]  [place] [id number] [phone]                 Patient Instructions:     Diet Adult Regular   Order Specific Question Answer Comments   Additional restrictions: Pureed      Activity as tolerated         Pending Diagnostic Studies:     None         Medications:  Reconciled Home Medications:   Current Discharge Medication List      START taking these medications    Details   balsam peru-castor oil  mg/gram Oint Apply topically 2 (two) times daily.  Qty: 30 g, Refills: 0      lorazepam 2 mg/ml oral conc (ATIVAN) 2 mg/mL Conc Take 0.5 mLs (1 mg total) by mouth every 3 (three) hours as needed.  Qty: 30 mL, Refills: 0      morphine 10 mg/5 mL solution Take 2.5 mLs (5 mg total) by mouth every 2 (two) hours as needed for Pain.  Qty: 15 mL, Refills: 0      scopolamine (TRANSDERM-SCOP) 1.3-1.5 mg (1 mg over 3 days) Place 1 patch onto the skin Every 3 (three) days.  Qty: 24 patch, Refills: 0         CONTINUE these medications which have CHANGED    Details   furosemide (LASIX) 20 MG tablet Take 1 tablet (20 mg total) by mouth daily as needed.  Qty: 30 tablet, Refills: 0         CONTINUE these medications which have NOT CHANGED    Details   acetaminophen (TYLENOL) 325 MG tablet Take 2 tablets (650 mg total) by mouth  every 6 (six) hours as needed for Pain or Temperature greater than (or equal to 101 degree F).  Refills: 0         STOP taking these medications       atorvastatin (LIPITOR) 10 MG tablet Comments:   Reason for Stopping:         diclofenac sodium (VOLTAREN) 1 % Gel Comments:   Reason for Stopping:         metoprolol succinate (TOPROL-XL) 50 MG 24 hr tablet Comments:   Reason for Stopping:         tamsulosin (FLOMAX) 0.4 mg Cp24 Comments:   Reason for Stopping:         ciprofloxacin HCl (CIPRO) 250 MG tablet Comments:   Reason for Stopping:         potassium chloride 10% (KAYCIEL) 20 mEq/15 mL solution Comments:   Reason for Stopping:               Indwelling Lines/Drains at time of discharge:   Lines/Drains/Airways     Central Venous Catheter Line                 Percutaneous Central Line Insertion/Assessment - triple lumen  03/12/18 0530 left internal jugular 3 days          Drain                 Urethral Catheter 03/12/18 0800 Coude 18 Fr. 3 days          Pressure Ulcer                 Pressure Injury 03/10/18 0715 Left medial Foot Unstageable 5 days         Pressure Injury 03/10/18 0715 Right medial Knee Stage 2 5 days         Pressure Injury 03/10/18 1721 Left medial Knee Stage 2 4 days         Pressure Injury 03/10/18 1723 Left medial Toe, first Deep tissue injury 4 days                Time spent on the discharge of patient: 40 minutes  Patient was seen and examined on the date of discharge and determined to be suitable for discharge.         Soheila Pettit MD  Department of Hospital Medicine  [facility]

## 2018-03-15 NOTE — SUBJECTIVE & OBJECTIVE
Interval History: pt cannot give history, no acute events     Review of Systems   Unable to perform ROS: Dementia     Objective:     Vital Signs (Most Recent):  Temp: 97.8 °F (36.6 °C) (03/14/18 2358)  Pulse: 75 (03/14/18 2358)  Resp: (!) 22 (03/14/18 2358)  BP: 121/77 (03/14/18 2358)  SpO2: 98 % (03/14/18 2358) Vital Signs (24h Range):  Temp:  [97.8 °F (36.6 °C)-98.9 °F (37.2 °C)] 97.8 °F (36.6 °C)  Pulse:  [75-84] 75  Resp:  [18-22] 22  SpO2:  [95 %-100 %] 98 %  BP: (110-168)/() 121/77     Weight: 65 kg (143 lb 4.8 oz)  Body mass index is 23.85 kg/m².    Intake/Output Summary (Last 24 hours) at 03/15/18 0648  Last data filed at 03/15/18 0600   Gross per 24 hour   Intake          1498.45 ml   Output             1300 ml   Net           198.45 ml      Physical Exam   Constitutional: No distress.   Cachetic and in fetal position   HENT:   Head: Atraumatic.   +temporal wasting, poor dentation but visualization poor    Eyes:   Conjunctiva appeared normal   Neck: Normal range of motion. Neck supple.   Cardiovascular: Normal rate and regular rhythm.    II/VI murmur   Pulmonary/Chest:   Poor effort, but no difficulty in breathing, slightly course with decrease at bases   Abdominal: Soft. Bowel sounds are normal.   Musculoskeletal: He exhibits edema.   Left arm in sling immobilizer   Neurological:   Awake, not oriented and mostly non-verbal, moves all extremities spontaneously, no focal deficits   Skin: Capillary refill takes 2 to 3 seconds. He is not diaphoretic.   Left heel ulcer, and stage III decubitus buttocks ulcers       Significant Labs: All pertinent labs within the past 24 hours have been reviewed.    Significant Imaging: I have reviewed all pertinent imaging results/findings within the past 24 hours.

## 2018-03-19 LAB
BACTERIA BLD CULT: NORMAL
BACTERIA BLD CULT: NORMAL

## 2018-03-20 ENCOUNTER — TELEPHONE (OUTPATIENT)
Dept: UROLOGY | Facility: CLINIC | Age: 83
End: 2018-03-20

## 2018-03-20 NOTE — TELEPHONE ENCOUNTER
Spoke with Ms. Cadena at [facility] in regards to patient missed pre-op appointment. Patient is now on hospice. She states cancel procedure for tomorrow. Surgery has been notified.

## 2018-03-20 NOTE — PHYSICIAN QUERY
"PT Name: Alen Marin  MR #: [id number]     Physician Query Form - Documentation Clarification      CDS: Lauryn Ventura RN, CCDS         Contact information :ext [phone]  [email or web address]       This form is a permanent document in the medical record.     Query Date: March 20, 2018    By submitting this query, we are merely seeking further clarification of documentation. Please utilize your independent clinical judgment when addressing the question(s) below.    The Medical record reflects the following:    Supporting Clinical Findings Location in Medical Record     "Left heel ulcer,   and stage III decubitus buttocks ulcers"     Left and right medial knees- 2 mm circular Stage 2 pressure injuries"    "Left medial foot- DTI 2 mm circular area great toe with epidermis intact. Unstageable pressure injury great toe MTP with eschar roof. Wound 2.5 circular wound without drainage/erythema"    "Treatment:  Local wound care to pressure injuries with foam dressing. Continue Pressure Injury Prevention Interventions with frequent pressure shifts. Venelex for treatment of IAD gluteal cleft/buttocks"     H&P 3/10/18      Wound care Consult 3/13/18      Wound care Consult 3/13/18            Wound care Consult 3/13/18                                                                                        Doctor, Please specify diagnosis or diagnoses associated with above clinical findings.  Please clarify pressure injuries to lower extremities.  Please select all that apply.    Provider Use Only      _X__Left and right medial knees- 2 mm circular Stage 2 pressure injuries    _X__Left medial foot- DTI  great toe    __X_Left medial foot- Unstageable pressure injury great toe MTP with eschar roof    ___Other, ______                                                                                                               [  ] Clinically undetermined            "

## 2018-03-20 NOTE — PHYSICIAN QUERY
"PT Name: Alen Marin  MR #: [id number]     Physician Query Form - Documentation Clarification      CDS: Lauryn Ventura RN, CCDS         Contact information :ext [phone])  [email or web address]       This form is a permanent document in the medical record.     Query Date: March 20, 2018    By submitting this query, we are merely seeking further clarification of documentation. Please utilize your independent clinical judgment when addressing the question(s) below.    The Medical record reflects the following:    Supporting Clinical Findings Location in Medical Record     "..stage III decubitus buttocks ulcers"    "Gluteal cleft/buttocks- Incontinence associated dermatitis with skin resurfacing with epithelium."    "Wound care nurse assess and order ointment to be apply bid to sacral area, wound care describe wound to sacral area as wet not a pressure ulcer."     H&P 3/10/18    Wound Care consult  3/13/18      RN note 3/13/18                                                                                Doctor, Please specify diagnosis or diagnoses associated with above clinical findings.  Please clarify integumentary condition diagnosis  for gluteal cleft/buttocks area.    Provider Use Only      ____X_Gluteal cleft/buttocks  Incontinence associated dermatitis     _____Buttocks  pressure injury stage 3, please specify laterality, _____    _____other clarification, ______                                                                                                               [  ] Clinically undetermined            "

## 2020-02-26 NOTE — ASSESSMENT & PLAN NOTE
- Chester catheter placed in OR 1/31/18 by Dr Price   - Keep chester in placed for at least one week    Will sign off, call with questions   Transitions of Care Status:  1.  Name of PCP:  2.  PCP Contacted on Admission: [ ] Y    [ ] N    3.  PCP contacted at Discharge: [ ] Y    [ ] N    [ ] N/A  4.  Post-Discharge Appointment Date and Location:  5.  Summary of Handoff given to PCP:

## 2021-06-02 NOTE — ASSESSMENT & PLAN NOTE
Supplement with Nepro  Assist with meal and change to pureed  Anticipate continued inadequate intake due to dementia  Recommended against feeding tube in this patient   No

## 2022-05-03 NOTE — NURSING
MR#: [id number]

Account#: [id number]

Accession#: [id number]

Date of Study: 05/03/2022

Ordering Physician: KRISTY NICHOLE, 

Referring Physician: KRISTY NICHOLE, 

Tech: [facility]





--------------- APPROVED REPORT --------------





EXAM: Two-dimensional and M-mode echocardiogram with Doppler and color Doppler.



Other Information 

Quality : GoodHR: 66bpm

Rhythm : NSR



INDICATION

Cardiac Disease: CAD 

Chest Pain 



Surgery/Intervention

CABG: 



RISK FACTORS

Hypertension 

Obesity   

Hyperlipidemia

Diabetes



2D DIMENSIONS 

RVDd3.7 (2.9-3.5cm)Left Atrium(2D)5.1 (1.6-4.0cm)

IVSd1.3 (0.7-1.1cm)Aortic Root(2D)3.6 (2.0-3.7cm)

LVDd4.6 (3.9-5.9cm)LVOT Diameter2.4 (1.8-2.4cm)

PWd1.3 (0.7-1.1cm)LVDs3.1 (2.5-4.0cm)

FS (%) 33.0 %SV60.7 ml

LVEF(%)61.6 (>50%)



Aortic Valve

AoV Peak Geoffrey.132.0cm/sAoV VTI29.4cm

AO Peak GR.7.0mmHgLVOT Peak Geoffrey.106.4cm/s

AO Mean GR.3mmHgAVA (VMAX)3.76cm2



Mitral Valve

MV E Tbktkped82.4cm/sMV DECEL [id number]

MV A Akdhohfi469.8cm/sE/A  Ratio0.8



Pulmonary Valve

PV Peak Xotiwszc080.1cm/s



Tricuspid Valve

TR P. Xrqihkqj363hr/sTR Peak Gr.40mmHg



 LEFT VENTRICLE 

The left ventricle is normal size. There is mild concentric left ventricular hypertrophy. The left ve
ntricular systolic function is normal and the ejection fraction is within normal range. Estimated eje
ction fraction 60%. There is normal LV segmental wall motion. Transmitral Doppler flow pattern is Gra
de I-abnormal relaxation pattern.



 RIGHT VENTRICLE 

The right ventricle is normal size. There is normal right ventricular wall thickness. The right ventr
icular systolic function is normal.



 ATRIA 

The left atrium is mildly dilated. The right atrium size is normal. The interatrial septum is intact 
with no evidence for an atrial septal defect or patent foramen ovale as noted on 2-D or Doppler imagi
ng.



 AORTIC VALVE 

The aortic valve is normal in structure and function. Doppler and Color Flow revealed no significant 
aortic regurgitation. There is no significant aortic valvular stenosis.



 MITRAL VALVE 

The mitral valve is normal in structure and function. There is no evidence of mitral valve prolapse. 
There is no mitral valve stenosis. Doppler and Color-flow revealed mild mitral regurgitation.



 TRICUSPID VALVE 

The tricuspid valve is normal in structure and function. Doppler and Color Flow revealed mild tricusp
id regurgitation. Estimated PAP 40-45 mmHg. There is no tricuspid valve stenosis.



 PULMONIC VALVE 

Doppler and Color Flow revealed mild pulmonic valvular regurgitation. There is no pulmonic valvular s
tenosis.



 GREAT VESSELS 

The aortic root is normal in size. The ascending aorta is normal in size. The IVC is normal in size a
nd collapses >50% with inspiration.



 PERICARDIAL EFFUSION 

There is no evidence of significant pericardial effusion.



Critical Notification

Critical Value: No



<Conclusion>

The left ventricular systolic function is normal and the ejection fraction is within normal range.  E
stimated ejection fraction 60%.

There is normal LV segmental wall motion.

Doppler and Color-flow revealed mild mitral regurgitation.

Doppler and Color Flow revealed mild tricuspid regurgitation.  Estimated PAP 40-45 mmHg.



Signed by : Bran Lucero, 

Electronically Approved : 05/03/2022 11:42:47 Repor received by JAVAN Kay. The pt is lying in bed resting. His son is at the bedside. Tele monitor in progress with alarms set. Safety precautions maintained and bed locked in lowest position. Side rails up X2. Call bell and personal items within reach. Will continue to monitor pt for any changes. Side rails up X2. Call bell and personal items within reach. Will continue to monitor pt for any changes.

## 2023-02-16 NOTE — PHYSICIAN QUERY
Called pt to rs 3/10/23 appt and declined next available. Asking to speak to RN for next soonest appt.  Please advise PT Name: Alen Marin  MR #: [id number]    Physician Query Form - Nutrition Clarification       CDS: Deanna Medel RN CCDS                  Contact Information: [email or web address]    This form is a permanent document in the medical record.     Query Date: January 31, 2018    By submitting this query, we are merely seeking further clarification of documentation.. Please utilize your independent clinical judgment when addressing the question(s) below.    The Medical record contains the following:   Indicators  Supporting Clinical Findings Location in Medical Record   X % of Estimated Energy Intake over a time frame from p.o., TF, or TPN % Intake of Estimated Energy Needs: 0 - 25 % RD note 1/30    Weight Status over a time frame     X Subcutaneous Fat and/or Muscle Loss loss of muscle mass, underweight RD note 1/30   X Fluid Accumulation or Edema edematous RD note 1/30    Reduced  Strength     X Wt / BMI / Usual Body Weight BMI= 16.3 Anthropometrics    Delayed Wound Healing / Failure to Thrive     X Acute or Chronic Illness (ARF, hyperkalemia) RD note 1/30    Medication      Treatment     X Other Patient refusing meals and fluids per nursing RD note 1/30     AND / ASPEN Clinical Characteristics (October 2011)  A minimum of two characteristics is recommended for diagnosing either moderate or severe malnutrition   Mild Malnutrition Moderate Malnutrition Severe Malnutrition   Energy Intake from p.o., TF or TPN. < 75% intake of estimated energy needs for less than 7 days < 75% intake of estimated energy needs for greater than 7 days < 50% intake of estimated energy needs for > 5 days   Weight Loss 1-2% in 1 month  5% in 3 months  7.5% in 6 months  10% in 1 year 1-2 % in 1 week  5% in 1 month  7.5% in 3 months  10% in 6 months  20% in 1 year > 2% in 1 week  > 5% in 1 month  > 7.5% in 3 months  > 10% in 6 months  > 20% in 1 year   Physical Findings     None *Mild subcutaneous fat and/or muscle loss  *Mild fluid  accumulation  *Stage II decubitus  *Surgical wound or non-healing wound *Mod/severe subcutaneous fat and/or muscle loss  *Mod/severe fluid accumulation  *Stage III or IV decubitus  *Non-healing surgical wound     Provider, please specify diagnosis or diagnoses associated with above clinical findings.    [x ] Mild Protein-Calorie Malnutrition  [ ] Underweight  [ ] Other Nutritional Diagnosis (please specify): ____________________________________  [ ] Other: ________________________________  [ ] Clinically Undetermined    Please document in your progress notes daily for the duration of treatment until resolved and include in your discharge summary.

## (undated) DEVICE — CATH COUNCIL TIP 18FR

## (undated) DEVICE — GLOVE BIOGEL PI MICRO SZ 7

## (undated) DEVICE — SEE L#95700

## (undated) DEVICE — WIRE GUIDE .035 150CM.

## (undated) DEVICE — SYR ONLY LUER LOCK 20CC

## (undated) DEVICE — SOL IRR NACL .9% 3000ML

## (undated) DEVICE — BLANKET UPPER BODY 78.7X29.9IN

## (undated) DEVICE — GOWN B1 X-LG X-LONG

## (undated) DEVICE — COVER SNAP KAP 26IN

## (undated) DEVICE — MAT QUICK 40X30 FLOOR FLUID LF

## (undated) DEVICE — GLOVE SURG BIOGEL LATEX SZ 7.5

## (undated) DEVICE — SEE MEDLINE ITEM 152467

## (undated) DEVICE — SYR 50ML CATH TIP

## (undated) DEVICE — CANISTER SUCTION MEDI-VAC 12L

## (undated) DEVICE — Device

## (undated) DEVICE — SYR 10CC LUER LOCK

## (undated) DEVICE — CATH URTRL OPEN END STR TIP 5F